# Patient Record
Sex: FEMALE | Race: BLACK OR AFRICAN AMERICAN | NOT HISPANIC OR LATINO | Employment: OTHER | URBAN - METROPOLITAN AREA
[De-identification: names, ages, dates, MRNs, and addresses within clinical notes are randomized per-mention and may not be internally consistent; named-entity substitution may affect disease eponyms.]

---

## 2018-09-17 ENCOUNTER — HOSPITAL ENCOUNTER (EMERGENCY)
Facility: HOSPITAL | Age: 68
Discharge: HOME | End: 2018-09-17
Payer: MEDICARE

## 2018-09-17 VITALS
TEMPERATURE: 99 F | DIASTOLIC BLOOD PRESSURE: 76 MMHG | SYSTOLIC BLOOD PRESSURE: 165 MMHG | WEIGHT: 193 LBS | HEART RATE: 72 BPM | RESPIRATION RATE: 18 BRPM | BODY MASS INDEX: 34.2 KG/M2 | HEIGHT: 63 IN | OXYGEN SATURATION: 97 %

## 2018-09-17 DIAGNOSIS — J32.9 SINUSITIS, UNSPECIFIED CHRONICITY, UNSPECIFIED LOCATION: Primary | ICD-10-CM

## 2018-09-17 PROCEDURE — 99283 EMERGENCY DEPT VISIT LOW MDM: CPT

## 2018-09-17 RX ORDER — AZITHROMYCIN 250 MG/1
250 TABLET, FILM COATED ORAL DAILY
Qty: 6 TABLET | Refills: 0 | Status: SHIPPED | OUTPATIENT
Start: 2018-09-17 | End: 2019-02-05 | Stop reason: CLARIF

## 2018-09-17 RX ORDER — CETIRIZINE HYDROCHLORIDE 10 MG/1
10 TABLET ORAL DAILY
Qty: 30 TABLET | Refills: 0 | Status: SHIPPED | OUTPATIENT
Start: 2018-09-17 | End: 2021-10-29

## 2018-09-17 NOTE — ED PROVIDER NOTES
Encounter Date: 2018       History     Chief Complaint   Patient presents with    Sore Throat     Onset of symptoms 2 weeks ago.     Headache     Described to be frontal in nature and with a pressure sensation.    Facial Pain    Otalgia     Reports pain worse to the left ear.    Nasal Congestion     Pt complains of sore throat and sinus pressure for the past week.  Denies decreased intake or any other complaints.          Review of patient's allergies indicates:   Allergen Reactions    Ketorolac tromethamine Other (See Comments)     Other reaction(s): Burning    Moxifloxacin Other (See Comments)     Heart racing.     Past Medical History:   Diagnosis Date    Anxiety     Arthritis     Diabetes mellitus     Hypertension      Past Surgical History:   Procedure Laterality Date    BREAST SURGERY Left     FOOT SURGERY Bilateral     SINUS SURGERY      TUBAL LIGATION      WRIST SURGERY Left      No family history on file.  Social History     Tobacco Use    Smoking status: Former Smoker     Last attempt to quit: 1999     Years since quittin.4    Smokeless tobacco: Never Used   Substance Use Topics    Alcohol use: Not on file     Comment: social    Drug use: No     Review of Systems   Constitutional: Negative.    HENT: Positive for congestion, rhinorrhea, sinus pressure and sinus pain.    Eyes: Negative.    Respiratory: Negative.    Cardiovascular: Negative.    Gastrointestinal: Negative.    Endocrine: Negative.    Genitourinary: Negative.    Musculoskeletal: Negative.    Neurological: Negative.    Psychiatric/Behavioral: Negative.        Physical Exam     Initial Vitals   BP Pulse Resp Temp SpO2   -- -- -- -- --      MAP       --         Physical Exam    Constitutional: She appears well-developed.   HENT:   Head: Normocephalic and atraumatic.   Right Ear: Tympanic membrane normal.   Left Ear: Tympanic membrane normal.   Nose: Right sinus exhibits maxillary sinus tenderness and frontal sinus  tenderness.   Eyes: Pupils are equal, round, and reactive to light.   Neck: Normal range of motion.   Cardiovascular: Normal rate and regular rhythm.   Pulmonary/Chest: Breath sounds normal.   Abdominal: Soft.   Neurological: She is alert and oriented to person, place, and time. GCS score is 15. GCS eye subscore is 4. GCS verbal subscore is 5. GCS motor subscore is 6.   Skin: Skin is warm and dry. Capillary refill takes less than 2 seconds.         ED Course   Procedures  Labs Reviewed - No data to display       Imaging Results    None                               Clinical Impression:   The encounter diagnosis was Sinusitis, unspecified chronicity, unspecified location.      Disposition:   Disposition: Discharged  Condition: Julian Schroeder NP  09/17/18 [id number]

## 2018-09-17 NOTE — ED NOTES
Discharge instructions discussed with pt. All questions answered. Delay in discharge until pt's granddaughter, who is also being treated in the ED is also discharged. Pt verbalized understanding.

## 2019-01-21 DIAGNOSIS — J32.4 CHRONIC PANSINUSITIS: Primary | ICD-10-CM

## 2019-01-24 ENCOUNTER — HOSPITAL ENCOUNTER (OUTPATIENT)
Dept: RADIOLOGY | Facility: HOSPITAL | Age: 69
Discharge: HOME | End: 2019-01-24
Attending: OTOLARYNGOLOGY
Payer: MEDICARE

## 2019-01-24 DIAGNOSIS — J32.4 CHRONIC PANSINUSITIS: ICD-10-CM

## 2019-01-24 PROCEDURE — 70486 CT MAXILLOFACIAL W/O DYE: CPT | Mod: 26,,, | Performed by: RADIOLOGY

## 2019-01-24 PROCEDURE — 70486 CT MAXILLOFACIAL W/O DYE: CPT | Mod: TC

## 2019-01-24 PROCEDURE — 70486 CT SINUSES WITHOUT CONTRAST: ICD-10-PCS | Mod: 26,,, | Performed by: RADIOLOGY

## 2019-01-29 ENCOUNTER — APPOINTMENT (OUTPATIENT)
Dept: LAB | Facility: HOSPITAL | Age: 69
End: 2019-01-29
Attending: OTOLARYNGOLOGY
Payer: MEDICARE

## 2019-01-29 DIAGNOSIS — J32.9 CHRONIC SINUSITIS: Primary | ICD-10-CM

## 2019-01-29 PROCEDURE — 87070 CULTURE OTHR SPECIMN AEROBIC: CPT

## 2019-01-31 LAB — BACTERIA NPH AEROBE CULT: NORMAL

## 2019-02-05 ENCOUNTER — HOSPITAL ENCOUNTER (OUTPATIENT)
Dept: PREADMISSION TESTING | Facility: HOSPITAL | Age: 69
Discharge: HOME | End: 2019-02-05
Attending: OTOLARYNGOLOGY
Payer: MEDICARE

## 2019-02-05 ENCOUNTER — HOSPITAL ENCOUNTER (OUTPATIENT)
Dept: RADIOLOGY | Facility: HOSPITAL | Age: 69
Discharge: HOME | End: 2019-02-05
Attending: OTOLARYNGOLOGY
Payer: MEDICARE

## 2019-02-05 ENCOUNTER — ANESTHESIA EVENT (OUTPATIENT)
Dept: SURGERY | Facility: HOSPITAL | Age: 69
End: 2019-02-05
Payer: MEDICARE

## 2019-02-05 ENCOUNTER — HOSPITAL ENCOUNTER (OUTPATIENT)
Dept: CARDIOLOGY | Facility: HOSPITAL | Age: 69
Discharge: HOME | End: 2019-02-05
Attending: OTOLARYNGOLOGY
Payer: MEDICARE

## 2019-02-05 VITALS — WEIGHT: 190 LBS | BODY MASS INDEX: 34.96 KG/M2 | HEIGHT: 62 IN

## 2019-02-05 DIAGNOSIS — Z01.818 PRE-OPERATIVE EXAMINATION: Primary | ICD-10-CM

## 2019-02-05 DIAGNOSIS — Z01.818 PRE-OPERATIVE EXAMINATION: ICD-10-CM

## 2019-02-05 PROCEDURE — 71046 XR CHEST PA AND LATERAL: ICD-10-PCS | Mod: 26,,, | Performed by: RADIOLOGY

## 2019-02-05 PROCEDURE — 71046 X-RAY EXAM CHEST 2 VIEWS: CPT | Mod: TC,FY

## 2019-02-05 PROCEDURE — 93005 ELECTROCARDIOGRAM TRACING: CPT

## 2019-02-05 PROCEDURE — 71046 X-RAY EXAM CHEST 2 VIEWS: CPT | Mod: 26,,, | Performed by: RADIOLOGY

## 2019-02-05 RX ORDER — GLIMEPIRIDE 2 MG/1
2 TABLET ORAL
COMMUNITY
End: 2023-06-05

## 2019-02-05 RX ORDER — CEPHALEXIN 500 MG/1
500 CAPSULE ORAL 4 TIMES DAILY
COMMUNITY
Start: 2019-02-07 | End: 2019-07-22

## 2019-02-05 RX ORDER — FLUTICASONE PROPIONATE 50 MCG
2 SPRAY, SUSPENSION (ML) NASAL DAILY PRN
COMMUNITY
End: 2023-06-05 | Stop reason: SDUPTHER

## 2019-02-05 RX ORDER — ERGOCALCIFEROL 1.25 MG/1
50000 CAPSULE ORAL
COMMUNITY
End: 2021-10-29

## 2019-02-05 RX ORDER — AMLODIPINE BESYLATE 10 MG/1
10 TABLET ORAL NIGHTLY
COMMUNITY
End: 2023-06-05 | Stop reason: SDUPTHER

## 2019-02-05 RX ORDER — POTASSIUM CHLORIDE 1.5 G/1.58G
20 POWDER, FOR SOLUTION ORAL 2 TIMES DAILY
COMMUNITY
End: 2023-06-05

## 2019-02-05 RX ORDER — FLUTICASONE FUROATE AND VILANTEROL 200; 25 UG/1; UG/1
1 POWDER RESPIRATORY (INHALATION) DAILY PRN
COMMUNITY
End: 2021-11-29 | Stop reason: CLARIF

## 2019-02-05 RX ORDER — HYDROCODONE BITARTRATE AND ACETAMINOPHEN 10; 325 MG/1; MG/1
1 TABLET ORAL EVERY 6 HOURS PRN
COMMUNITY
Start: 2019-02-07 | End: 2019-07-22

## 2019-02-05 RX ORDER — LOSARTAN POTASSIUM AND HYDROCHLOROTHIAZIDE 25; 100 MG/1; MG/1
1 TABLET ORAL DAILY
COMMUNITY
End: 2023-06-05 | Stop reason: SDUPTHER

## 2019-02-05 RX ORDER — METFORMIN HYDROCHLORIDE 500 MG/1
500 TABLET ORAL 2 TIMES DAILY WITH MEALS
COMMUNITY
End: 2021-10-29

## 2019-02-05 RX ORDER — PAROXETINE HYDROCHLORIDE 20 MG/1
20 TABLET, FILM COATED ORAL 2 TIMES DAILY
COMMUNITY
End: 2021-10-29

## 2019-02-05 NOTE — ANESTHESIA PREPROCEDURE EVALUATION
02/05/2019  Emerald Pyle is a 68 y.o., female.    Pre-op Assessment    I have reviewed the Patient Summary Reports.    I have reviewed the Nursing Notes.   I have reviewed the Medications.     Review of Systems  Anesthesia Hx:  No problems with previous Anesthesia  Neg history of prior surgery. Denies Family Hx of Anesthesia complications.   Denies Personal Hx of Anesthesia complications.   Social:  Non-Smoker    Hematology/Oncology:  Hematology Normal   Oncology Normal     EENT/Dental:EENT/Dental Normal   Cardiovascular:   Hypertension, well controlled                  Heart Murmmur   Pulmonary:  Pulmonary Normal    Renal/:  Renal/ Normal     Hepatic/GI:  Hepatic/GI Normal    Musculoskeletal:  Musculoskeletal Normal    Endocrine:   Diabetes, well controlled    Dermatological:  Skin Normal    Psych:  Psychiatric Normal           Physical Exam  General:  Well nourished    Airway/Jaw/Neck:  AIRWAY FINDINGS: Normal      Eyes/Ears/Nose:  EYES/EARS/NOSE FINDINGS: Normal   Dental:  DENTAL FINDINGS: Normal   Chest/Lungs:  Chest/Lungs Clear    Heart/Vascular:  Heart Findings: Normal Heart murmur: negative Vascular Findings: Normal    Abdomen:  Abdomen Findings: Normal    Musculoskeletal:  Musculoskeletal Findings: Normal   Skin:  Skin Findings: Normal         Anesthesia Plan  Type of Anesthesia, risks & benefits discussed:  Anesthesia Type:  general  Patient's Preference:   Intra-op Monitoring Plan: standard ASA monitors  Intra-op Monitoring Plan Comments:   Post Op Pain Control Plan:   Post Op Pain Control Plan Comments:   Induction:   IV  Beta Blocker:  Patient is not currently on a Beta-Blocker (No further documentation required).       Informed Consent: Patient understands risks and agrees with Anesthesia plan.  Questions answered. Anesthesia consent signed with patient.  ASA Score: 3     Day of Surgery  Review of History & Physical:    H&P update referred to the provider.

## 2019-02-07 ENCOUNTER — ANESTHESIA (OUTPATIENT)
Dept: SURGERY | Facility: HOSPITAL | Age: 69
End: 2019-02-07
Payer: MEDICARE

## 2019-02-07 ENCOUNTER — HOSPITAL ENCOUNTER (OUTPATIENT)
Facility: HOSPITAL | Age: 69
Discharge: HOME | End: 2019-02-07
Attending: OTOLARYNGOLOGY | Admitting: OTOLARYNGOLOGY
Payer: MEDICARE

## 2019-02-07 VITALS
SYSTOLIC BLOOD PRESSURE: 159 MMHG | TEMPERATURE: 99 F | BODY MASS INDEX: 34.96 KG/M2 | RESPIRATION RATE: 18 BRPM | HEIGHT: 62 IN | HEART RATE: 80 BPM | OXYGEN SATURATION: 97 % | WEIGHT: 190 LBS | DIASTOLIC BLOOD PRESSURE: 88 MMHG

## 2019-02-07 DIAGNOSIS — J32.0 ANTRITIS CHRONIC: ICD-10-CM

## 2019-02-07 LAB — POCT GLUCOSE: 214 MG/DL (ref 70–110)

## 2019-02-07 PROCEDURE — 88305 TISSUE EXAM BY PATHOLOGIST: CPT | Mod: 26,,, | Performed by: PATHOLOGY

## 2019-02-07 PROCEDURE — D9220A PRA ANESTHESIA: Mod: CRNA,,, | Performed by: NURSE ANESTHETIST, CERTIFIED REGISTERED

## 2019-02-07 PROCEDURE — 36000708 HC OR TIME LEV III 1ST 15 MIN: Performed by: OTOLARYNGOLOGY

## 2019-02-07 PROCEDURE — 88305 TISSUE SPECIMEN TO PATHOLOGY - SURGERY: ICD-10-PCS | Mod: 26,,, | Performed by: PATHOLOGY

## 2019-02-07 PROCEDURE — 63600175 PHARM REV CODE 636 W HCPCS

## 2019-02-07 PROCEDURE — C1726 CATH, BAL DIL, NON-VASCULAR: HCPCS | Performed by: OTOLARYNGOLOGY

## 2019-02-07 PROCEDURE — 25000003 PHARM REV CODE 250: Performed by: OTOLARYNGOLOGY

## 2019-02-07 PROCEDURE — 63600175 PHARM REV CODE 636 W HCPCS: Performed by: ANESTHESIOLOGY

## 2019-02-07 PROCEDURE — C2625 STENT, NON-COR, TEM W/DEL SY: HCPCS | Performed by: OTOLARYNGOLOGY

## 2019-02-07 PROCEDURE — 63600175 PHARM REV CODE 636 W HCPCS: Performed by: NURSE ANESTHETIST, CERTIFIED REGISTERED

## 2019-02-07 PROCEDURE — 25000003 PHARM REV CODE 250: Performed by: ANESTHESIOLOGY

## 2019-02-07 PROCEDURE — 37000008 HC ANESTHESIA 1ST 15 MINUTES: Performed by: OTOLARYNGOLOGY

## 2019-02-07 PROCEDURE — D9220A PRA ANESTHESIA: ICD-10-PCS | Mod: CRNA,,, | Performed by: NURSE ANESTHETIST, CERTIFIED REGISTERED

## 2019-02-07 PROCEDURE — 71000015 HC POSTOP RECOV 1ST HR: Performed by: OTOLARYNGOLOGY

## 2019-02-07 PROCEDURE — 71000033 HC RECOVERY, INTIAL HOUR: Performed by: OTOLARYNGOLOGY

## 2019-02-07 PROCEDURE — 27201423 OPTIME MED/SURG SUP & DEVICES STERILE SUPPLY: Performed by: OTOLARYNGOLOGY

## 2019-02-07 PROCEDURE — 63600175 PHARM REV CODE 636 W HCPCS: Performed by: OTOLARYNGOLOGY

## 2019-02-07 PROCEDURE — 36000709 HC OR TIME LEV III EA ADD 15 MIN: Performed by: OTOLARYNGOLOGY

## 2019-02-07 PROCEDURE — D9220A PRA ANESTHESIA: Mod: ANES,,, | Performed by: ANESTHESIOLOGY

## 2019-02-07 PROCEDURE — D9220A PRA ANESTHESIA: ICD-10-PCS | Mod: ANES,,, | Performed by: ANESTHESIOLOGY

## 2019-02-07 PROCEDURE — 37000009 HC ANESTHESIA EA ADD 15 MINS: Performed by: OTOLARYNGOLOGY

## 2019-02-07 PROCEDURE — 88305 TISSUE EXAM BY PATHOLOGIST: CPT | Performed by: PATHOLOGY

## 2019-02-07 PROCEDURE — 25000003 PHARM REV CODE 250: Performed by: NURSE ANESTHETIST, CERTIFIED REGISTERED

## 2019-02-07 DEVICE — IMPLANT PROPEL MOMETASON 8MM: Type: IMPLANTABLE DEVICE | Site: SINUS | Status: FUNCTIONAL

## 2019-02-07 RX ORDER — LIDOCAINE HYDROCHLORIDE 10 MG/ML
1 INJECTION, SOLUTION EPIDURAL; INFILTRATION; INTRACAUDAL; PERINEURAL ONCE
Status: DISCONTINUED | OUTPATIENT
Start: 2019-02-07 | End: 2019-02-07 | Stop reason: HOSPADM

## 2019-02-07 RX ORDER — METOPROLOL TARTRATE 25 MG/1
25 TABLET, FILM COATED ORAL 2 TIMES DAILY
COMMUNITY
End: 2021-10-29

## 2019-02-07 RX ORDER — MIDAZOLAM HYDROCHLORIDE 1 MG/ML
INJECTION, SOLUTION INTRAMUSCULAR; INTRAVENOUS
Status: DISCONTINUED | OUTPATIENT
Start: 2019-02-07 | End: 2019-02-07

## 2019-02-07 RX ORDER — MIDAZOLAM HYDROCHLORIDE 1 MG/ML
INJECTION INTRAMUSCULAR; INTRAVENOUS
Status: DISCONTINUED
Start: 2019-02-07 | End: 2019-02-07 | Stop reason: HOSPADM

## 2019-02-07 RX ORDER — MEPERIDINE HYDROCHLORIDE 50 MG/ML
INJECTION INTRAMUSCULAR; INTRAVENOUS; SUBCUTANEOUS
Status: DISCONTINUED | OUTPATIENT
Start: 2019-02-07 | End: 2019-02-07

## 2019-02-07 RX ORDER — SUCCINYLCHOLINE CHLORIDE 20 MG/ML
INJECTION INTRAMUSCULAR; INTRAVENOUS
Status: DISCONTINUED | OUTPATIENT
Start: 2019-02-07 | End: 2019-02-07

## 2019-02-07 RX ORDER — SODIUM CHLORIDE, SODIUM LACTATE, POTASSIUM CHLORIDE, CALCIUM CHLORIDE 600; 310; 30; 20 MG/100ML; MG/100ML; MG/100ML; MG/100ML
125 INJECTION, SOLUTION INTRAVENOUS CONTINUOUS
Status: DISCONTINUED | OUTPATIENT
Start: 2019-02-07 | End: 2019-02-07 | Stop reason: HOSPADM

## 2019-02-07 RX ORDER — MIDAZOLAM HYDROCHLORIDE 1 MG/ML
2 INJECTION INTRAMUSCULAR; INTRAVENOUS ONCE
Status: COMPLETED | OUTPATIENT
Start: 2019-02-07 | End: 2019-02-07

## 2019-02-07 RX ORDER — MORPHINE SULFATE 2 MG/ML
INJECTION, SOLUTION INTRAMUSCULAR; INTRAVENOUS
Status: COMPLETED
Start: 2019-02-07 | End: 2019-02-07

## 2019-02-07 RX ORDER — MORPHINE SULFATE 2 MG/ML
2 INJECTION, SOLUTION INTRAMUSCULAR; INTRAVENOUS EVERY 5 MIN PRN
Status: DISCONTINUED | OUTPATIENT
Start: 2019-02-07 | End: 2019-02-07 | Stop reason: HOSPADM

## 2019-02-07 RX ORDER — ONDANSETRON 2 MG/ML
INJECTION INTRAMUSCULAR; INTRAVENOUS
Status: DISCONTINUED | OUTPATIENT
Start: 2019-02-07 | End: 2019-02-07

## 2019-02-07 RX ORDER — LABETALOL HCL 20 MG/4 ML
SYRINGE (ML) INTRAVENOUS
Status: DISCONTINUED
Start: 2019-02-07 | End: 2019-02-07 | Stop reason: HOSPADM

## 2019-02-07 RX ORDER — PROPOFOL 10 MG/ML
VIAL (ML) INTRAVENOUS
Status: DISCONTINUED | OUTPATIENT
Start: 2019-02-07 | End: 2019-02-07

## 2019-02-07 RX ORDER — SODIUM CHLORIDE, SODIUM LACTATE, POTASSIUM CHLORIDE, CALCIUM CHLORIDE 600; 310; 30; 20 MG/100ML; MG/100ML; MG/100ML; MG/100ML
INJECTION, SOLUTION INTRAVENOUS CONTINUOUS PRN
Status: DISCONTINUED | OUTPATIENT
Start: 2019-02-07 | End: 2019-02-07

## 2019-02-07 RX ORDER — TRIAMCINOLONE ACETONIDE 40 MG/ML
120 INJECTION, SUSPENSION INTRA-ARTICULAR; INTRAMUSCULAR ONCE
Status: DISCONTINUED | OUTPATIENT
Start: 2019-02-07 | End: 2019-02-07

## 2019-02-07 RX ORDER — BUDESONIDE 0.25 MG/2ML
INHALANT ORAL
Status: DISCONTINUED
Start: 2019-02-07 | End: 2019-02-07 | Stop reason: WASHOUT

## 2019-02-07 RX ORDER — LABETALOL HCL 20 MG/4 ML
10 SYRINGE (ML) INTRAVENOUS EVERY 10 MIN PRN
Status: DISCONTINUED | OUTPATIENT
Start: 2019-02-07 | End: 2019-02-07 | Stop reason: HOSPADM

## 2019-02-07 RX ORDER — ONDANSETRON 2 MG/ML
4 INJECTION INTRAMUSCULAR; INTRAVENOUS DAILY PRN
Status: DISCONTINUED | OUTPATIENT
Start: 2019-02-07 | End: 2019-02-07 | Stop reason: HOSPADM

## 2019-02-07 RX ORDER — LIDOCAINE HYDROCHLORIDE AND EPINEPHRINE 10; 10 MG/ML; UG/ML
INJECTION, SOLUTION INFILTRATION; PERINEURAL
Status: DISCONTINUED | OUTPATIENT
Start: 2019-02-07 | End: 2019-02-07 | Stop reason: HOSPADM

## 2019-02-07 RX ORDER — OXYMETAZOLINE HCL 0.05 %
SPRAY, NON-AEROSOL (ML) NASAL
Status: DISCONTINUED | OUTPATIENT
Start: 2019-02-07 | End: 2019-02-07 | Stop reason: HOSPADM

## 2019-02-07 RX ORDER — ROCURONIUM BROMIDE 10 MG/ML
INJECTION, SOLUTION INTRAVENOUS
Status: DISCONTINUED | OUTPATIENT
Start: 2019-02-07 | End: 2019-02-07

## 2019-02-07 RX ORDER — SODIUM CHLORIDE, SODIUM LACTATE, POTASSIUM CHLORIDE, CALCIUM CHLORIDE 600; 310; 30; 20 MG/100ML; MG/100ML; MG/100ML; MG/100ML
INJECTION, SOLUTION INTRAVENOUS CONTINUOUS
Status: DISCONTINUED | OUTPATIENT
Start: 2019-02-07 | End: 2019-02-07 | Stop reason: HOSPADM

## 2019-02-07 RX ORDER — CEFAZOLIN SODIUM 1 G/3ML
INJECTION, POWDER, FOR SOLUTION INTRAMUSCULAR; INTRAVENOUS
Status: DISCONTINUED | OUTPATIENT
Start: 2019-02-07 | End: 2019-02-07

## 2019-02-07 RX ORDER — TRIAMCINOLONE ACETONIDE 40 MG/ML
80 INJECTION, SUSPENSION INTRA-ARTICULAR; INTRAMUSCULAR ONCE
Status: COMPLETED | OUTPATIENT
Start: 2019-02-07 | End: 2019-02-07

## 2019-02-07 RX ADMIN — ONDANSETRON 4 MG: 2 INJECTION INTRAMUSCULAR; INTRAVENOUS at 11:02

## 2019-02-07 RX ADMIN — MORPHINE SULFATE 2 MG: 2 INJECTION, SOLUTION INTRAMUSCULAR; INTRAVENOUS at 02:02

## 2019-02-07 RX ADMIN — SODIUM CHLORIDE, POTASSIUM CHLORIDE, SODIUM LACTATE AND CALCIUM CHLORIDE: 600; 310; 30; 20 INJECTION, SOLUTION INTRAVENOUS at 11:02

## 2019-02-07 RX ADMIN — LABETALOL HYDROCHLORIDE 10 MG: 5 INJECTION, SOLUTION INTRAVENOUS at 02:02

## 2019-02-07 RX ADMIN — MIDAZOLAM HYDROCHLORIDE 2 MG: 1 INJECTION, SOLUTION INTRAMUSCULAR; INTRAVENOUS at 11:02

## 2019-02-07 RX ADMIN — ROCURONIUM BROMIDE 30 MG: 10 INJECTION, SOLUTION INTRAVENOUS at 12:02

## 2019-02-07 RX ADMIN — CEFAZOLIN 1 G: 330 INJECTION, POWDER, FOR SOLUTION INTRAMUSCULAR; INTRAVENOUS at 12:02

## 2019-02-07 RX ADMIN — SUCCINYLCHOLINE CHLORIDE 150 MG: 20 INJECTION, SOLUTION INTRAMUSCULAR; INTRAVENOUS at 11:02

## 2019-02-07 RX ADMIN — SODIUM CHLORIDE, POTASSIUM CHLORIDE, SODIUM LACTATE AND CALCIUM CHLORIDE: 600; 310; 30; 20 INJECTION, SOLUTION INTRAVENOUS at 10:02

## 2019-02-07 RX ADMIN — MIDAZOLAM HYDROCHLORIDE 2 MG: 1 INJECTION, SOLUTION INTRAMUSCULAR; INTRAVENOUS at 10:02

## 2019-02-07 RX ADMIN — MEPERIDINE HYDROCHLORIDE 50 MG: 50 INJECTION INTRAMUSCULAR; INTRAVENOUS; SUBCUTANEOUS at 11:02

## 2019-02-07 RX ADMIN — SUGAMMADEX 200 MG: 100 INJECTION, SOLUTION INTRAVENOUS at 01:02

## 2019-02-07 RX ADMIN — TRIAMCINOLONE ACETONIDE 80 MG: 40 INJECTION, SUSPENSION INTRA-ARTICULAR; INTRAMUSCULAR at 02:02

## 2019-02-07 RX ADMIN — PROPOFOL 200 MG: 10 INJECTION, EMULSION INTRAVENOUS at 11:02

## 2019-02-07 NOTE — BRIEF OP NOTE
[facility] – [place], [place] - Periop Services  Brief Operative Note     SUMMARY     Surgery Date: 2/7/2019     Surgeon(s) and Role:     * Michael Grubbs MD - Primary        Pre-op Diagnosis:  Deviated nasal septum [J34.2]  Upper respiratory tract obstruction [J98.8]  Hypertrophy of nasal turbinates [J34.3]  Chronic ethmoidal sinusitis [J32.2]  Chronic sphenoidal sinusitis [J32.3]  Chronic frontal sinusitis [J32.1]  Chronic maxillary sinusitis [J32.0]    Post-op Diagnosis:  Post-Op Diagnosis Codes:     * Deviated nasal septum [J34.2]     * Upper respiratory tract obstruction [J98.8]     * Hypertrophy of nasal turbinates [J34.3]     * Chronic ethmoidal sinusitis [J32.2]     * Chronic sphenoidal sinusitis [J32.3]     * Chronic frontal sinusitis [J32.1]     * Chronic maxillary sinusitis [J32.0]    Procedure(s) (LRB):  FESS (FUNCTIONAL ENDOSCOPIC SINUS SURGERY) (N/A)  SEPTOPLASTY, NOSE (Bilateral)  REDUCTION, NASAL TURBINATE (Bilateral)      Description of the findings of the procedure:  Deviated nasal septum [J34.2]  Upper respiratory tract obstruction [J98.8]  Hypertrophy of nasal turbinates [J34.3]  Chronic ethmoidal sinusitis [J32.2]  Chronic sphenoidal sinusitis [J32.3]  Chronic frontal sinusitis [J32.1]  Chronic maxillary sinusitis [J32.0]      Estimated Blood Loss: * No values recorded between 2/7/2019 12:12 PM and 2/7/2019  1:53 PM *

## 2019-02-07 NOTE — PLAN OF CARE
Neuro assessment complete. WNL. Discharge teaching complete. All questions answered and handouts provided.

## 2019-02-07 NOTE — OP NOTE
DATE OF PROCEDURE:  02/07/2019.    PREOPERATIVE DIAGNOSES:  1.  Chronic sinusitis.  2.  Bilateral inferior turbinate hypertrophy.  3.  Nasal septal deviation.  4.  Nasal airway obstruction secondary to #2 and #3.    POSTOPERATIVE DIAGNOSIS:    PROCEDURES PERFORMED:  1.  Bilateral endoscopic anterior and posterior ethmoidectomy.  2.  Bilateral endoscopic maxillary sinus antrostomies.  3.  Bilateral sphenoidotomies.  4.  Bilateral frontal sinus exploration with removal of tissue.  5.  Septoplasty.  6.  Bilateral SMR of inferior turbinates.    ANESTHESIA:  General endotracheal.    SURGEON:  Dr. Grubbs.    PROCEDURE IN DETAILS:  The patient was taken to the operating room and  placed in the supine position. An IV was placed into the patient's arm.  After satisfactory general anesthesia had been obtained, the procedure was  begun. Four pledgets of 4 mL of 4% cocaine were placed into the patient's  nose. The patient's nose was injected with 1% lidocaine with 1:100,000  epinephrine; approximately 15 mL was used. The patient was prepped and  draped in the standard fashion for a nasal operation.    Under endoscopic vision, the Sawyer was walked down the lateral nasal wall  of the right nostril until it fell into the anterior ethmoid cells. The  anterior ethmoid cells were exenterated back to the lamina ethmoidalis. The  lamina ethmoidalis was breached, and the posterior ethmoid cells  exenterated back to the rostrum of the sphenoid. Under endoscopic vision  the sphenoid ostium was viewed and palpated and opened to approximately 5  mm in diameter. The 30 degree scope was taken and the maxillary sinus  viewed and palpated and the ostium opened to approximately 1 cm in  diameter. With a 70 degree scope, the nasofrontal area of the ethmoid  sinuses was viewed and, under endoscopic guidance, the nasofrontal duct was  opened so that it emptied widely into the nose. This region was then packed  with 2 cocaine pledgets and attention  turned to the left side.    Under endoscopic vision, the Bulloch was walked down the lateral nasal wall  of the left nostril until it fell into the anterior ethmoid cells. The  anterior ethmoid cells were exenterated back to the lamina ethmoidalis. The  lamina ethmoidalis was breached and the posterior ethmoid cells exenterated  back to the rostrum of the sphenoid. The 0 degree scope was taken and the  sphenoid ostium viewed and palpated. This was opened to approximately 5 mm  in diameter. The 30 degree scope was taken and the maxillary sinus ostium  viewed and palpated and opened to approximately 1 cm in diameter. With a 70  degree scope, the nasofrontal area of the ethmoid sinuses was viewed and,  under endoscopic guidance, the nasofrontal duct was opened so that it  emptied widely into the nose. The left airway was then packed with cocaine  pledgets.    Next, the inferior turbinates bilaterally were injected with lidocaine 1%  with epinephrine 1:100,000; approximately 10 mL was used. Then 4 mL of 4%  cocaine was placed on 4 pledgets and placed into the patient's nose. The  cocaine pledgets were removed and the procedure was begun. Attention was  turned to the right inferior turbinate. An incision was made along the  inferior aspect of the right inferior turbinate from posterior to anterior.  A medial flap was elevated with the Bulloch. The turbinate bone and lateral  turbinate mucosa were excised over the anterior one half of the turbinate  and the medial flap laid back over the operative bed. Attention was then  turned to the left inferior turbinate. An incision was made from posterior  to anterior along the inferior surface of the inferior turbinate. A medial  flap was elevated. The turbinate bone and the lateral turbinate mucosa were  removed over the anterior one half of the inferior turbinate and the medial  flap laid back over the operative bed.    The septum was then injected with lidocaine and epinephrine  on each side;  approximately 10 mL was used. A standard septoplasty was then accomplished.  Cocaine pledgets were replaced. Packs were placed into the middle meatus,  and nasal packs were placed medial to the inferior turbinates. The patient  was awakened and taken to the recovery room.    ADDENDUM:  After the frontal sinus explorations had been accomplished and  the frontal sinus ducts opened widely into the nose, tissue was removed  from the frontal sinuses bilaterally in order to allow the remainder of the  frontal sinus to communicate with the frontal sinus ducts.        KEN/JAMEL dd: 02/07/2019 14:01:00 (CST)   td: 02/07/2019 15:02:35 (CST)  Doc ID #[id number]   Job ID #[id number]    CC:

## 2019-02-07 NOTE — DISCHARGE INSTRUCTIONS
Nasal Surgery: Septoplasty    Youre scheduled to have nasal surgery. The type of nasal surgery youre having is called septoplasty. Read on to learn more about what to expect during this surgery.During surgery, the surgeon may remove cartilage and bone to reshape the deviated septum. After surgery, there is more breathing space. Enough cartilage and bone remain to give the nose support.  What to expect during septoplasty  This surgery repairs a blockage inside the nose caused by a deviated septum. With a deviated septum, there is a problem with the wall that divides the nose into two chambers. A deviated septum may block air coming through one or both nostrils. This makes it harder for you to breathe through your nose. During septoplasty, the surgeon makes incisions inside the nose. Then the surgeon trims, reshapes, moves, or removes cartilage and sometimes bone from the septum.  Risks and possible complications  As with any surgery, nasal surgery has some risks. These include a slight risk of bleeding and infection. Your doctor will discuss any other risks and complications with you.   After septoplasty  After septoplasty, youll be taken to a recovery area or to your hospital room. Your experience may be as follows:  · You may have packing material inside your nose. This reduces bleeding and promotes healing. You may also have bandages (dressings) on the outside of your nose.  · Its normal to have some mucus and blood drain from your nose. Until packing is removed, you may have to breathe through your mouth.  · You may have some swelling or bruising around the eyelids if a rhinoplasty was also done.  · Expect some throat dryness and irritation.  · Pain medicine will be prescribed as needed. Dont take medicine that contains aspirin or ibuprofen. These can cause increased bleeding.  Follow-up care  Youll need to follow up with your doctor within a week after your surgery. Here is what to expect:  · Any  packing, splint, or dressings will probably be removed. You may feel slight discomfort and bleed a little when this is done.  · After the splint or packing is removed, youll most likely breathe better than you did before surgery.  · You may have minor numbness, pain, swelling, and a little stiffness under the tip of the nose.  · In a few days, the inside of your nose may swell and briefly block your breathing. Or a scab or crust may block breathing for a short time. Leave the scab alone. Your doctor can remove it. Using saline (irrigation or aerosol) regularly after surgery helps to reduce the amount of crusting at each visit.  · Contact your healthcare provider if you have any questions or concerns.  Date Last Reviewed: 11/1/2016 © 2000-2017 [employer]. [address] [place] [address], [place] [address]. All rights reserved. This information is not intended as a substitute for professional medical care. Always follow your healthcare professional's instructions.        Discharge Instructions: After Your Surgery  Youve just had surgery. During surgery, you were given medicine called anesthesia to keep you relaxed and free of pain. After surgery, you may have some pain or nausea. This is common. Here are some tips for feeling better and getting well after surgery.     Stay on schedule with your medicine.   Going home  Your healthcare provider will show you how to take care of yourself when you go home. He or she will also answer your questions. Have an adult family member or friend drive you home. For the first 24 hours after your surgery:  · Do not drive or use heavy equipment.  · Do not make important decisions or sign legal papers.  · Do not drink alcohol.  · Have someone stay with you, if needed. He or she can watch for problems and help keep you safe.  Be sure to go to all follow-up visits with your healthcare provider. And rest after your surgery for as long as your healthcare provider tells you  to.  Coping with pain  If you have pain after surgery, pain medicine will help you feel better. Take it as told, before pain becomes severe. Also, ask your healthcare provider or pharmacist about other ways to control pain. This might be with heat, ice, or relaxation. And follow any other instructions your surgeon or nurse gives you.  Tips for taking pain medicine  To get the best relief possible, remember these points:  · Pain medicines can upset your stomach. Taking them with a little food may help.  · Most pain relievers taken by mouth need at least 20 to 30 minutes to start to work.  · Taking medicine on a schedule can help you remember to take it. Try to time your medicine so that you can take it before starting an activity. This might be before you get dressed, go for a walk, or sit down for dinner.  · Constipation is a common side effect of pain medicines. Call your healthcare provider before taking any medicines such as laxatives or stool softeners to help ease constipation. Also ask if you should skip any foods. Drinking lots of fluids and eating foods such as fruits and vegetables that are high in fiber can also help. Remember, do not take laxatives unless your surgeon has prescribed them.  · Drinking alcohol and taking pain medicine can cause dizziness and slow your breathing. It can even be deadly. Do not drink alcohol while taking pain medicine.  · Pain medicine can make you react more slowly to things. Do not drive or run machinery while taking pain medicine.  Your healthcare provider may tell you to take acetaminophen to help ease your pain. Ask him or her how much you are supposed to take each day. Acetaminophen or other pain relievers may interact with your prescription medicines or other over-the-counter (OTC) medicines. Some prescription medicines have acetaminophen and other ingredients. Using both prescription and OTC acetaminophen for pain can cause you to overdose. Read the labels on your OTC  medicines with care. This will help you to clearly know the list of ingredients, how much to take, and any warnings. It may also help you not take too much acetaminophen. If you have questions or do not understand the information, ask your pharmacist or healthcare provider to explain it to you before you take the OTC medicine.  Managing nausea  Some people have an upset stomach after surgery. This is often because of anesthesia, pain, or pain medicine, or the stress of surgery. These tips will help you handle nausea and eat healthy foods as you get better. If you were on a special food plan before surgery, ask your healthcare provider if you should follow it while you get better. These tips may help:  · Do not push yourself to eat. Your body will tell you when to eat and how much.  · Start off with clear liquids and soup. They are easier to digest.  · Next try semi-solid foods, such as mashed potatoes, applesauce, and gelatin, as you feel ready.  · Slowly move to solid foods. Dont eat fatty, rich, or spicy foods at first.  · Do not force yourself to have 3 large meals a day. Instead eat smaller amounts more often.  · Take pain medicines with a small amount of solid food, such as crackers or toast, to avoid nausea.     Call your surgeon if  · You still have pain an hour after taking medicine. The medicine may not be strong enough.  · You feel too sleepy, dizzy, or groggy. The medicine may be too strong.  · You have side effects like nausea, vomiting, or skin changes, such as rash, itching, or hives.       If you have obstructive sleep apnea  You were given anesthesia medicine during surgery to keep you comfortable and free of pain. After surgery, you may have more apnea spells because of this medicine and other medicines you were given. The spells may last longer than usual.   At home:  · Keep using the continuous positive airway pressure (CPAP) device when you sleep. Unless your healthcare provider tells you not  to, use it when you sleep, day or night. CPAP is a common device used to treat obstructive sleep apnea.  · Talk with your provider before taking any pain medicine, muscle relaxants, or sedatives. Your provider will tell you about the possible dangers of taking these medicines.  Date Last Reviewed: 12/1/2016 © 2000-2017 [employer]. [address] [place] [address], [place] [address]. All rights reserved. This information is not intended as a substitute for professional medical care. Always follow your healthcare professional's instructions.        Nasal Surgery: Your Recovery  Youve just had nasal surgery. During the first weeks after surgery, be sure to follow the advice of your doctor. The tips on this sheet can help speed your recovery.  Tips for healing  · Dont take medicines containing aspirin or ibuprofen.  · Don't bump your nose or touch the splint or packing.  · Don't bend or lift.  · Sneeze or cough with your mouth open to reduce pressure inside your nose.  · Keep from blowing your nose until youre told its OK to do so.  · Keep eyeglasses from resting on your nose by taping them above the nose.  · Protect your nose from the sun.  · After packing is removed, start saltwater rinses if prescribed.  · Keep follow-up appointments with your doctor.  Follow-up visits  Your doctor will most likely want to see you within a week to check your healing. Any packing, splint, or dressings will probably be removed at that time. You may feel slight discomfort and bleed a little when this is done.  Assessing the results  During later follow-up visits, your doctor will assess your healing and the results of your surgery. Talk with your doctor about any problems or concerns you may have.  When to call the doctor  Call the doctor if you notice any of the following:  · Sudden increase in pain, swelling, or bruising  · Fever  · Heavy bleeding  · Yellow or greenish drainage from the nose  · Unrelieved  headache  · Decreased or double vision  · Stiff neck or very tired feeling   Date Last Reviewed: 11/1/2016  © 8116-2192 [employer]. [address] [place] [address], [place] [address]. All rights reserved. This information is not intended as a substitute for professional medical care. Always follow your healthcare professional's instructions.

## 2019-02-07 NOTE — DISCHARGE SUMMARY
[facility] - Periop Services    Discharge Note        SUMMARY     Admit Date: 2/7/2019    Attending Physician: Michael Grubbs MD     Discharge Physician: Michael Grubbs MD    Discharge Date: 2/7/2019 2:02 PM      Hospital Course: Patient tolerated procedure well.     Disposition: Home or Self Care    Patient Instructions:   Current Discharge Medication List      CONTINUE these medications which have NOT CHANGED    Details   amLODIPine (NORVASC) 10 MG tablet Take 10 mg by mouth once daily.      cetirizine (ZYRTEC) 10 MG tablet Take 1 tablet (10 mg total) by mouth once daily.  Qty: 30 tablet, Refills: 0      dulaglutide (TRULICITY SUBQ) Inject 0.5 mg into the skin every 7 days.      ergocalciferol (VITAMIN D2) 50,000 unit Cap Take 50,000 Units by mouth every 7 days.      fluticasone (FLONASE) 50 mcg/actuation nasal spray 2 sprays by Each Nare route daily as needed for Rhinitis.      fluticasone-vilanterol (BREO ELLIPTA) 200-25 mcg/dose DsDv diskus inhaler Inhale 1 puff into the lungs daily as needed. Controller      glimepiride (AMARYL) 2 MG tablet Take 2 mg by mouth before breakfast.      losartan-hydrochlorothiazide 100-25 mg (HYZAAR) 100-25 mg per tablet Take 1 tablet by mouth once daily.      metFORMIN (GLUCOPHAGE) 500 MG tablet Take 500 mg by mouth 2 (two) times daily with meals.      metoprolol tartrate (LOPRESSOR) 25 MG tablet Take 25 mg by mouth 2 (two) times daily.      paroxetine (PAXIL) 20 MG tablet Take 20 mg by mouth 2 (two) times daily.      potassium chloride (KLOR-CON) 20 mEq Pack Take 20 mEq by mouth 2 (two) times daily.      ranitidine (ZANTAC) 150 MG tablet Take 150 mg by mouth 2 (two) times daily.      cephALEXin (KEFLEX) 500 MG capsule Take 500 mg by mouth 4 (four) times daily.      HYDROcodone-acetaminophen (NORCO)  mg per tablet Take 1 tablet by mouth every 6 (six) hours as needed for Pain.      KCL IN INVERTED SUGAR 10%-NACL IV Inject into the vein.              Discharge Procedure Orders (must include Diet, Follow-up, Activity):  No discharge procedures on file.     Follow Up:  Follow up as scheduled.  Resume routine diet.  Activity as tolerated.

## 2019-02-07 NOTE — PLAN OF CARE
Patient awake and alert. VSS. IV intact and infusing. No complaints of pain or discomfort. Tolerating fluids without N/V. Neuro check complete. WNL.

## 2019-02-07 NOTE — TRANSFER OF CARE
"Anesthesia Transfer of Care Note    Patient: Emerald Pyle    Procedure(s) Performed: Procedure(s) (LRB):  FESS (FUNCTIONAL ENDOSCOPIC SINUS SURGERY) (N/A)  SEPTOPLASTY, NOSE (Bilateral)  REDUCTION, NASAL TURBINATE (Bilateral)    Patient location: PACU    Anesthesia Type: general    Transport from OR: Transported from OR on room air with adequate spontaneous ventilation    Post pain: adequate analgesia    Post assessment: no apparent anesthetic complications and tolerated procedure well    Post vital signs: stable    Level of consciousness: sedated and responds to stimulation    Nausea/Vomiting: no nausea/vomiting    Complications: none    Transfer of care protocol was followed      Last vitals:   Visit Vitals  /89 (BP Location: Left arm, Patient Position: Lying)   Pulse 80   Temp 37.2 °C (99 °F) (Oral)   Resp 20   Ht 5' 2" (1.575 m)   Wt 86.2 kg (190 lb)   SpO2 99%   Breastfeeding? No   BMI 34.75 kg/m²     "

## 2019-02-07 NOTE — ANESTHESIA POSTPROCEDURE EVALUATION
"Anesthesia Post Evaluation    Patient: Emerald Pyle    Procedure(s) Performed: Procedure(s) (LRB):  FESS (FUNCTIONAL ENDOSCOPIC SINUS SURGERY) (N/A)  SEPTOPLASTY, NOSE (Bilateral)  REDUCTION, NASAL TURBINATE (Bilateral)    Final Anesthesia Type: general  Patient location during evaluation: PACU  Patient participation: Yes- Able to Participate  Level of consciousness: awake and awake and alert  Post-procedure vital signs: reviewed and stable  Pain management: adequate  Airway patency: patent  PONV status at discharge: No PONV  Anesthetic complications: no      Cardiovascular status: blood pressure returned to baseline  Respiratory status: unassisted and spontaneous ventilation  Hydration status: euvolemic  Follow-up not needed.        Visit Vitals  BP (!) 159/88   Pulse 80   Temp 37.2 °C (99 °F) (Oral)   Resp 18   Ht 5' 2" (1.575 m)   Wt 86.2 kg (190 lb)   SpO2 97%   Breastfeeding? No   BMI 34.75 kg/m²       Pain/Constance Score: Pain Rating Prior to Med Admin: 7 (2/7/2019  2:40 PM)  Constance Score: 10 (2/7/2019  3:30 PM)        "

## 2019-02-07 NOTE — DISCHARGE SUMMARY
[facility] - Periop Services    Discharge Note        SUMMARY     Admit Date: 2/7/2019    Attending Physician: Michael Grubbs MD     Discharge Physician: Michael Grubbs MD    Discharge Date: 2/7/2019 2:01 PM      Hospital Course: Patient tolerated procedure well.     Disposition: Home or Self Care    Patient Instructions:   Current Discharge Medication List      CONTINUE these medications which have NOT CHANGED    Details   amLODIPine (NORVASC) 10 MG tablet Take 10 mg by mouth once daily.      cetirizine (ZYRTEC) 10 MG tablet Take 1 tablet (10 mg total) by mouth once daily.  Qty: 30 tablet, Refills: 0      dulaglutide (TRULICITY SUBQ) Inject 0.5 mg into the skin every 7 days.      ergocalciferol (VITAMIN D2) 50,000 unit Cap Take 50,000 Units by mouth every 7 days.      fluticasone (FLONASE) 50 mcg/actuation nasal spray 2 sprays by Each Nare route daily as needed for Rhinitis.      fluticasone-vilanterol (BREO ELLIPTA) 200-25 mcg/dose DsDv diskus inhaler Inhale 1 puff into the lungs daily as needed. Controller      glimepiride (AMARYL) 2 MG tablet Take 2 mg by mouth before breakfast.      losartan-hydrochlorothiazide 100-25 mg (HYZAAR) 100-25 mg per tablet Take 1 tablet by mouth once daily.      metFORMIN (GLUCOPHAGE) 500 MG tablet Take 500 mg by mouth 2 (two) times daily with meals.      metoprolol tartrate (LOPRESSOR) 25 MG tablet Take 25 mg by mouth 2 (two) times daily.      paroxetine (PAXIL) 20 MG tablet Take 20 mg by mouth 2 (two) times daily.      potassium chloride (KLOR-CON) 20 mEq Pack Take 20 mEq by mouth 2 (two) times daily.      ranitidine (ZANTAC) 150 MG tablet Take 150 mg by mouth 2 (two) times daily.      cephALEXin (KEFLEX) 500 MG capsule Take 500 mg by mouth 4 (four) times daily.      HYDROcodone-acetaminophen (NORCO)  mg per tablet Take 1 tablet by mouth every 6 (six) hours as needed for Pain.      KCL IN INVERTED SUGAR 10%-NACL IV Inject into the vein.              Discharge Procedure Orders (must include Diet, Follow-up, Activity):  No discharge procedures on file.     Follow Up:  Follow up as scheduled.  Resume routine diet.  Activity as tolerated.

## 2019-05-09 ENCOUNTER — HOSPITAL ENCOUNTER (EMERGENCY)
Facility: HOSPITAL | Age: 69
Discharge: HOME | End: 2019-05-09
Payer: MEDICARE

## 2019-05-09 VITALS
HEART RATE: 90 BPM | SYSTOLIC BLOOD PRESSURE: 200 MMHG | DIASTOLIC BLOOD PRESSURE: 95 MMHG | RESPIRATION RATE: 20 BRPM | OXYGEN SATURATION: 99 % | TEMPERATURE: 98 F

## 2019-05-09 DIAGNOSIS — R21 RASH: Primary | ICD-10-CM

## 2019-05-09 PROCEDURE — 99284 EMERGENCY DEPT VISIT MOD MDM: CPT

## 2019-05-09 RX ORDER — HYDROXYZINE HYDROCHLORIDE 25 MG/1
25 TABLET, FILM COATED ORAL 3 TIMES DAILY PRN
Qty: 15 TABLET | Refills: 0 | Status: SHIPPED | OUTPATIENT
Start: 2019-05-09 | End: 2019-08-08

## 2019-05-09 RX ORDER — PERMETHRIN 50 MG/G
CREAM TOPICAL
Qty: 60 G | Refills: 0 | Status: SHIPPED | OUTPATIENT
Start: 2019-05-09 | End: 2019-07-22

## 2019-05-09 NOTE — ED PROVIDER NOTES
Encounter Date: 2019       History   No chief complaint on file.    Emerald Pyle is a 68 y.o with PMHx including anxiety, arthritis, diabetes and hypertension. She presents to ED with complaint itchy, rash x 10 days after being confined on train  She is taking Benadryl and Hydrocortiosne cream with no improvement         Review of patient's allergies indicates:   Allergen Reactions    Ketorolac tromethamine Other (See Comments)     Other reaction(s): Burning    Moxifloxacin Other (See Comments)     Heart racing.    Prevacid [lansoprazole] Itching     Past Medical History:   Diagnosis Date    Anxiety     Arthritis     Chronic sinusitis     Diabetes mellitus     Hypertension      Past Surgical History:   Procedure Laterality Date    BREAST LUMPECTOMY Left     BUNIONECTOMY Bilateral     COLONOSCOPY      ESOPHAGOGASTRODUODENOSCOPY      ETHMOIDECTOMY Bilateral 2019    [facility]    FESS (FUNCTIONAL ENDOSCOPIC SINUS SURGERY) N/A 2019    [facility]    MAXILLARY ANTROSTOMY Bilateral 2019    [facility]    REDUCTION, NASAL TURBINATE Bilateral 2019    [facility]    SEPTOPLASTY, NOSE Bilateral 2019    [facility]    SINUS SURGERY      x4    TUBAL LIGATION      WRIST SURGERY Left      No family history on file.  Social History     Tobacco Use    Smoking status: Former Smoker     Packs/day: 0.75     Years: 25.00     Pack years: 18.75     Types: Cigarettes     Last attempt to quit: 1999     Years since quittin.0    Smokeless tobacco: Never Used   Substance Use Topics    Alcohol use: Yes     Frequency: Never     Comment: social    Drug use: No     Review of Systems   Constitutional: Negative for fever.   HENT: Negative for sore throat.    Respiratory: Negative for shortness of breath.     Cardiovascular: Negative for chest pain.   Gastrointestinal: Negative for nausea.   Genitourinary: Negative for dysuria.   Musculoskeletal: Negative for back pain.   Skin: Positive for rash.   Neurological: Negative for weakness.   Hematological: Does not bruise/bleed easily.   All other systems reviewed and are negative.      Physical Exam     Initial Vitals   BP Pulse Resp Temp SpO2   -- -- -- -- --      MAP       --         Physical Exam    Nursing note and vitals reviewed.  Constitutional: She appears well-developed and well-nourished.   HENT:   Head: Normocephalic.   Eyes: Pupils are equal, round, and reactive to light.   Neck: Normal range of motion.   Cardiovascular: Normal rate, regular rhythm and normal heart sounds.   Pulmonary/Chest: Breath sounds normal.   Musculoskeletal: Normal range of motion.   Neurological: She is alert and oriented to person, place, and time.   Skin: Skin is warm and dry. Rash noted.        Psychiatric: She has a normal mood and affect. Her behavior is normal. Judgment and thought content normal.         ED Course   Procedures  Labs Reviewed - No data to display       Imaging Results    None          Medical Decision Making:   Initial Assessment:   Patient with complaint itchy, rash x 10 days after being confined on train  She is taking Benadryl and Hydrocortiosne cream with no improvement         Differential Diagnosis:   Contact dermatitis  Bed bugs, scabies  Allergic reaction  shingles  ED Management:  Discussed physical exam findings with patient  No acute emergent medical condition identified at this time to warrant further testing/diagnostics.  Plan to discharge patient home with instructions per AVS.  Follow-up with PCP in 2-5 days or return to ED if symptoms worsen.  Patient verbalized agreement to discharge treatment plan.                      Clinical Impression:       ICD-10-CM ICD-9-CM   1. Rash R21 782.1                                Shania Prabhakar NP  05/09/19  [id number]

## 2019-07-22 ENCOUNTER — HOSPITAL ENCOUNTER (EMERGENCY)
Facility: HOSPITAL | Age: 69
Discharge: HOME | End: 2019-07-22
Attending: EMERGENCY MEDICINE
Payer: MEDICARE

## 2019-07-22 VITALS
BODY MASS INDEX: 33.31 KG/M2 | DIASTOLIC BLOOD PRESSURE: 85 MMHG | HEART RATE: 79 BPM | OXYGEN SATURATION: 99 % | SYSTOLIC BLOOD PRESSURE: 190 MMHG | HEIGHT: 63 IN | RESPIRATION RATE: 16 BRPM | WEIGHT: 188 LBS | TEMPERATURE: 99 F

## 2019-07-22 DIAGNOSIS — J06.9 BACTERIAL UPPER RESPIRATORY INFECTION: ICD-10-CM

## 2019-07-22 DIAGNOSIS — B96.89 BACTERIAL UPPER RESPIRATORY INFECTION: ICD-10-CM

## 2019-07-22 DIAGNOSIS — B37.0 THRUSH, ORAL: Primary | ICD-10-CM

## 2019-07-22 PROCEDURE — 99284 EMERGENCY DEPT VISIT MOD MDM: CPT

## 2019-07-22 RX ORDER — AMOXICILLIN AND CLAVULANATE POTASSIUM 875; 125 MG/1; MG/1
1 TABLET, FILM COATED ORAL 2 TIMES DAILY
Qty: 14 TABLET | Refills: 0 | Status: SHIPPED | OUTPATIENT
Start: 2019-07-22 | End: 2019-08-08

## 2019-07-22 RX ORDER — NYSTATIN 100000 [USP'U]/ML
500000 SUSPENSION ORAL 4 TIMES DAILY
Qty: 60 ML | Refills: 0 | Status: SHIPPED | OUTPATIENT
Start: 2019-07-22 | End: 2019-08-01

## 2019-07-29 NOTE — ED PROVIDER NOTES
"Encounter Date: 2019       History     Chief Complaint   Patient presents with    Thrush     on tongue     69yo female with pmh anxiety, osteoarthritis, chronic sinusitis, DM, and HTN presents to ED for evaluation of thrush on her tongue. States she has recently finished a course of antibiotics for sinusitis and occasionally "this happens." Denies fever, chills, sore throat, difficulty swallowing.         Review of patient's allergies indicates:   Allergen Reactions    Ketorolac tromethamine Other (See Comments)     Other reaction(s): Burning    Moxifloxacin Other (See Comments)     Heart racing.    Prevacid [lansoprazole] Itching     Past Medical History:   Diagnosis Date    Anxiety     Arthritis     Chronic sinusitis     Diabetes mellitus     Hypertension      Past Surgical History:   Procedure Laterality Date    BREAST LUMPECTOMY Left     BUNIONECTOMY Bilateral     COLONOSCOPY      ESOPHAGOGASTRODUODENOSCOPY      ETHMOIDECTOMY Bilateral 2019    [facility] OR    FESS (FUNCTIONAL ENDOSCOPIC SINUS SURGERY) N/A 2019    [facility] OR    MAXILLARY ANTROSTOMY Bilateral 2019    [facility] OR    REDUCTION, NASAL TURBINATE Bilateral 2019    [facility] OR    SEPTOPLASTY, NOSE Bilateral 2019    [facility] OR    SINUS SURGERY      x4    TUBAL LIGATION      WRIST SURGERY Left      History reviewed. No pertinent family history.  Social History     Tobacco Use    Smoking status: Former Smoker     Packs/day: 0.75     Years: 25.00     Pack years: 18.75     Types: Cigarettes     Last attempt to quit: 1999     Years since quittin.3    Smokeless tobacco: Never Used   Substance Use Topics    Alcohol use: Yes     Frequency: Never     Comment: social    Drug use: No     Review of Systems   Constitutional: Negative for " appetite change, chills, diaphoresis, fatigue and fever.   HENT: Negative for congestion, ear pain, mouth sores, rhinorrhea, sinus pressure, sinus pain, sore throat, tinnitus and trouble swallowing.         Thrush   Eyes: Negative for photophobia and visual disturbance.   Respiratory: Negative for cough, chest tightness, shortness of breath and wheezing.    Cardiovascular: Negative for chest pain, palpitations and leg swelling.   Gastrointestinal: Negative for abdominal pain, constipation, diarrhea, nausea and vomiting.   Endocrine: Negative for cold intolerance, heat intolerance, polydipsia, polyphagia and polyuria.   Genitourinary: Negative for decreased urine volume, difficulty urinating, dysuria, flank pain, frequency, hematuria and urgency.   Musculoskeletal: Negative for arthralgias, back pain, gait problem, joint swelling, myalgias, neck pain and neck stiffness.   Skin: Negative for color change, pallor, rash and wound.   Allergic/Immunologic: Negative for immunocompromised state.   Neurological: Negative for dizziness, syncope, weakness, light-headedness, numbness and headaches.   Hematological: Negative for adenopathy. Does not bruise/bleed easily.   Psychiatric/Behavioral: Negative for decreased concentration, dysphoric mood and sleep disturbance. The patient is not nervous/anxious.    All other systems reviewed and are negative.      Physical Exam     Initial Vitals [07/22/19 2142]   BP Pulse Resp Temp SpO2   (!) 190/85 79 16 98.5 °F (36.9 °C) 99 %      MAP       --         Physical Exam    Nursing note and vitals reviewed.  Constitutional: She appears well-developed and well-nourished. She is not diaphoretic. No distress.   HENT:   Head: Normocephalic and atraumatic.   Right Ear: External ear normal.   Left Ear: External ear normal.   Nose: Nose normal.   Mouth/Throat: Uvula is midline and oropharynx is clear and moist.   Thrush - tongue   Eyes: Conjunctivae are normal. Pupils are equal, round, and  reactive to light. No scleral icterus.   Neck: Normal range of motion. Neck supple.   Cardiovascular: Normal rate, regular rhythm, normal heart sounds and intact distal pulses.   Pulmonary/Chest: Breath sounds normal. No respiratory distress. She has no wheezes. She has no rhonchi. She has no rales. She exhibits no tenderness.   Abdominal: Soft. Bowel sounds are normal. She exhibits no distension. There is no tenderness. There is no rebound and no guarding.   Musculoskeletal: Normal range of motion. She exhibits no edema or tenderness.   Lymphadenopathy:     She has no cervical adenopathy.   Neurological: She is alert and oriented to person, place, and time. GCS score is 15. GCS eye subscore is 4. GCS verbal subscore is 5. GCS motor subscore is 6.   Skin: Skin is warm and dry. Capillary refill takes less than 2 seconds. No rash noted. No erythema. No pallor.   Psychiatric: She has a normal mood and affect. Her behavior is normal. Judgment and thought content normal.         ED Course   Procedures  Labs Reviewed - No data to display       Imaging Results    None          Medical Decision Making:   Differential Diagnosis:   Thrush                      Clinical Impression:       ICD-10-CM ICD-9-CM   1. Thrush, oral B37.0 112.0   2. Bacterial upper respiratory infection J06.9 465.9    B96.89 041.9         Disposition:   Disposition: Discharged  Condition: Stable                        Denice Varela MD  07/29/19 [id number]

## 2019-08-07 PROCEDURE — 96372 THER/PROPH/DIAG INJ SC/IM: CPT

## 2019-08-07 PROCEDURE — 99284 EMERGENCY DEPT VISIT MOD MDM: CPT | Mod: 25

## 2019-08-08 ENCOUNTER — HOSPITAL ENCOUNTER (EMERGENCY)
Facility: HOSPITAL | Age: 69
Discharge: HOME | End: 2019-08-08
Attending: EMERGENCY MEDICINE
Payer: MEDICARE

## 2019-08-08 VITALS
OXYGEN SATURATION: 100 % | RESPIRATION RATE: 16 BRPM | WEIGHT: 188.06 LBS | HEIGHT: 63 IN | TEMPERATURE: 99 F | BODY MASS INDEX: 33.32 KG/M2 | HEART RATE: 64 BPM | DIASTOLIC BLOOD PRESSURE: 76 MMHG | SYSTOLIC BLOOD PRESSURE: 159 MMHG

## 2019-08-08 DIAGNOSIS — J01.40 ACUTE PANSINUSITIS, RECURRENCE NOT SPECIFIED: Primary | ICD-10-CM

## 2019-08-08 PROCEDURE — 63600175 PHARM REV CODE 636 W HCPCS: Performed by: EMERGENCY MEDICINE

## 2019-08-08 PROCEDURE — 25000003 PHARM REV CODE 250: Performed by: EMERGENCY MEDICINE

## 2019-08-08 RX ORDER — ACETAMINOPHEN 325 MG/1
650 TABLET ORAL
Status: COMPLETED | OUTPATIENT
Start: 2019-08-08 | End: 2019-08-08

## 2019-08-08 RX ORDER — PREDNISONE 10 MG/1
30 TABLET ORAL DAILY
Qty: 9 TABLET | Refills: 0 | Status: SHIPPED | OUTPATIENT
Start: 2019-08-08 | End: 2019-08-11

## 2019-08-08 RX ORDER — DEXAMETHASONE SODIUM PHOSPHATE 100 MG/10ML
10 INJECTION INTRAMUSCULAR; INTRAVENOUS
Status: COMPLETED | OUTPATIENT
Start: 2019-08-08 | End: 2019-08-08

## 2019-08-08 RX ORDER — DOXYCYCLINE 100 MG/1
100 CAPSULE ORAL 2 TIMES DAILY
Qty: 20 CAPSULE | Refills: 0 | Status: SHIPPED | OUTPATIENT
Start: 2019-08-08 | End: 2019-08-18

## 2019-08-08 RX ADMIN — ACETAMINOPHEN 650 MG: 325 TABLET ORAL at 02:08

## 2019-08-08 RX ADMIN — DEXAMETHASONE SODIUM PHOSPHATE 10 MG: 10 INJECTION INTRAMUSCULAR; INTRAVENOUS at 01:08

## 2019-08-08 NOTE — DISCHARGE INSTRUCTIONS
Decadron 10 mg IM in emergency department  Prednisone 30 mg daily x3  Doxycycline 100 mg twice daily for 10  Follow-up primary care provider Jeanie

## 2019-08-08 NOTE — ED TRIAGE NOTES
"Pt c/o "my sinuses" x 1 month. Pt seen here for same within that time. Pt has not followed up with PCP. "My doctor's in [place]."  "

## 2019-08-09 ENCOUNTER — HOSPITAL ENCOUNTER (OUTPATIENT)
Dept: RADIOLOGY | Facility: HOSPITAL | Age: 69
Discharge: HOME | End: 2019-08-09
Attending: OTOLARYNGOLOGY
Payer: MEDICARE

## 2019-08-09 DIAGNOSIS — J32.9 CHRONIC SINUSITIS: Primary | ICD-10-CM

## 2019-08-09 DIAGNOSIS — J32.9 CHRONIC SINUSITIS: ICD-10-CM

## 2019-08-09 PROCEDURE — 70486 CT MAXILLOFACIAL W/O DYE: CPT | Mod: 26,,, | Performed by: RADIOLOGY

## 2019-08-09 PROCEDURE — 70486 CT MAXILLOFACIAL W/O DYE: CPT | Mod: TC

## 2019-08-09 PROCEDURE — 70486 CT SINUSES WITHOUT CONTRAST: ICD-10-PCS | Mod: 26,,, | Performed by: RADIOLOGY

## 2019-08-09 NOTE — ED PROVIDER NOTES
Encounter Date: 2019       History     Chief Complaint   Patient presents with    Sinusitis     68-year-old female complains of recurrence frontal and maxillary sinus pain and congestion with postnasal drip and mild sore throat    She denies any acute fever  Reports frontal headache positional in nature  Denies any bloody output  Denies any purulent cough for bloody sputum  Denies acute fever  Denies ear pain or mastoid pain  Denies change in mentation        Review of patient's allergies indicates:   Allergen Reactions    Ketorolac tromethamine Other (See Comments)     Other reaction(s): Burning    Moxifloxacin Other (See Comments)     Heart racing.    Prevacid [lansoprazole] Itching     Past Medical History:   Diagnosis Date    Anxiety     Arthritis     Chronic sinusitis     Diabetes mellitus     Hypertension      Past Surgical History:   Procedure Laterality Date    BREAST LUMPECTOMY Left     BUNIONECTOMY Bilateral     COLONOSCOPY      ESOPHAGOGASTRODUODENOSCOPY      ETHMOIDECTOMY Bilateral 2019    [facility]    FESS (FUNCTIONAL ENDOSCOPIC SINUS SURGERY) N/A 2019    [facility]    MAXILLARY ANTROSTOMY Bilateral 2019    [facility]    REDUCTION, NASAL TURBINATE Bilateral 2019    [facility]    SEPTOPLASTY, NOSE Bilateral 2019    [facility]    SINUS SURGERY      x4    TUBAL LIGATION      WRIST SURGERY Left      History reviewed. No pertinent family history.  Social History     Tobacco Use    Smoking status: Former Smoker     Packs/day: 0.75     Years: 25.00     Pack years: 18.75     Types: Cigarettes     Last attempt to quit: 1999     Years since quittin.3    Smokeless tobacco: Never Used   Substance Use Topics    Alcohol use: Yes     Frequency: Never     Comment: social    Drug use: No      Review of Systems   Constitutional: Negative.    HENT: Positive for congestion, postnasal drip, sinus pressure and sinus pain. Negative for ear pain, facial swelling, nosebleeds, rhinorrhea, sneezing, sore throat, tinnitus, trouble swallowing and voice change.    Eyes: Negative for discharge and visual disturbance.   Respiratory: Negative.    Cardiovascular: Negative.    Gastrointestinal: Negative.    Musculoskeletal: Negative.    Neurological: Positive for headaches (frontal ).   Hematological: Negative.    All other systems reviewed and are negative.      Physical Exam     Initial Vitals [08/07/19 2222]   BP Pulse Resp Temp SpO2   (!) 155/67 70 18 98.8 °F (37.1 °C) 98 %      MAP       --         Physical Exam    Nursing note and vitals reviewed.  Constitutional: She appears well-developed and well-nourished. She is not diaphoretic. No distress.   HENT:   Head: Normocephalic and atraumatic.   Nose: Mucosal edema and rhinorrhea present. Right sinus exhibits maxillary sinus tenderness and frontal sinus tenderness. Left sinus exhibits maxillary sinus tenderness and frontal sinus tenderness.   Mouth/Throat: Oropharynx is clear and moist. No oropharyngeal exudate.   Eyes: Conjunctivae and EOM are normal. Pupils are equal, round, and reactive to light. Right eye exhibits no discharge. Left eye exhibits no discharge. No scleral icterus.   Neck: Normal range of motion. Neck supple.   Cardiovascular: Normal rate, regular rhythm, normal heart sounds and intact distal pulses. Exam reveals no gallop and no friction rub.    No murmur heard.  Pulmonary/Chest: Breath sounds normal. No respiratory distress. She has no wheezes. She has no rhonchi. She has no rales.   Abdominal: Soft. Bowel sounds are normal. She exhibits no distension and no mass. There is no tenderness. There is no rebound and no guarding.   Musculoskeletal: Normal range of motion. She exhibits no edema or tenderness.   Lymphadenopathy:     She has no cervical  adenopathy.   Neurological: She is alert and oriented to person, place, and time. She has normal strength. No cranial nerve deficit or sensory deficit. GCS score is 15. GCS eye subscore is 4. GCS verbal subscore is 5. GCS motor subscore is 6.   Skin: Skin is warm and dry. Capillary refill takes less than 2 seconds. No rash noted. No erythema. No pallor.   Psychiatric: She has a normal mood and affect. Her behavior is normal. Judgment and thought content normal.         ED Course   Procedures  Labs Reviewed - No data to display       Imaging Results    None          Medical Decision Making:   ED Management:      Stable to discharge as per AVS    Decadron 10 mg IM in emergency department  Prednisone 30 mg daily x3  Doxycycline 100 mg twice daily for 10  Follow-up primary care provider Jeanie                      Clinical Impression:       ICD-10-CM ICD-9-CM   1. Acute pansinusitis, recurrence not specified J01.40 461.8         Disposition:   Disposition: Discharged  Condition: Stable                        Aditya Womakc MD  08/09/19 0448

## 2019-11-25 DIAGNOSIS — Z12.31 ENCOUNTER FOR SCREENING MAMMOGRAM FOR BREAST CANCER: ICD-10-CM

## 2019-11-25 DIAGNOSIS — Z13.820 SCREENING FOR OSTEOPOROSIS: ICD-10-CM

## 2019-11-25 DIAGNOSIS — R74.8 ABNORMAL LIVER ENZYMES: Primary | ICD-10-CM

## 2019-11-26 DIAGNOSIS — Z13.820 SCREENING FOR OSTEOPOROSIS: Primary | ICD-10-CM

## 2019-12-10 ENCOUNTER — APPOINTMENT (OUTPATIENT)
Dept: LAB | Facility: HOSPITAL | Age: 69
End: 2019-12-10
Attending: OTOLARYNGOLOGY
Payer: MEDICARE

## 2019-12-10 DIAGNOSIS — J34.3 HYPERTROPHY OF NASAL TURBINATES: Primary | ICD-10-CM

## 2019-12-10 PROCEDURE — 87186 SC STD MICRODIL/AGAR DIL: CPT

## 2019-12-10 PROCEDURE — 87070 CULTURE OTHR SPECIMN AEROBIC: CPT

## 2019-12-14 LAB — BACTERIA NPH AEROBE CULT: ABNORMAL

## 2019-12-17 ENCOUNTER — HOSPITAL ENCOUNTER (OUTPATIENT)
Dept: RADIOLOGY | Facility: HOSPITAL | Age: 69
Discharge: HOME | End: 2019-12-17
Attending: NURSE PRACTITIONER
Payer: MEDICARE

## 2019-12-17 VITALS — WEIGHT: 188.06 LBS | HEIGHT: 63 IN | BODY MASS INDEX: 33.32 KG/M2

## 2019-12-17 DIAGNOSIS — Z13.820 SCREENING FOR OSTEOPOROSIS: Primary | ICD-10-CM

## 2019-12-17 DIAGNOSIS — R74.8 ABNORMAL LIVER ENZYMES: ICD-10-CM

## 2019-12-17 DIAGNOSIS — Z12.31 ENCOUNTER FOR SCREENING MAMMOGRAM FOR BREAST CANCER: ICD-10-CM

## 2019-12-17 PROCEDURE — 77067 MAMMO DIGITAL SCREENING BILAT WITH TOMOSYNTHESIS_CAD: ICD-10-PCS | Mod: 26,,, | Performed by: RADIOLOGY

## 2019-12-17 PROCEDURE — 77063 MAMMO DIGITAL SCREENING BILAT WITH TOMOSYNTHESIS_CAD: ICD-10-PCS | Mod: 26,,, | Performed by: RADIOLOGY

## 2019-12-17 PROCEDURE — 77063 BREAST TOMOSYNTHESIS BI: CPT | Mod: 26,,, | Performed by: RADIOLOGY

## 2019-12-17 PROCEDURE — 77067 SCR MAMMO BI INCL CAD: CPT | Mod: 26,,, | Performed by: RADIOLOGY

## 2019-12-17 PROCEDURE — 77067 SCR MAMMO BI INCL CAD: CPT | Mod: TC

## 2019-12-17 PROCEDURE — 76705 ECHO EXAM OF ABDOMEN: CPT | Mod: TC

## 2019-12-17 PROCEDURE — 76705 US ABDOMEN LIMITED: ICD-10-PCS | Mod: 26,,, | Performed by: RADIOLOGY

## 2019-12-17 PROCEDURE — 76705 ECHO EXAM OF ABDOMEN: CPT | Mod: 26,,, | Performed by: RADIOLOGY

## 2019-12-18 ENCOUNTER — LAB VISIT (OUTPATIENT)
Dept: LAB | Facility: HOSPITAL | Age: 69
End: 2019-12-18
Attending: OTOLARYNGOLOGY
Payer: MEDICARE

## 2019-12-18 DIAGNOSIS — J32.4 CHRONIC PANSINUSITIS: Primary | ICD-10-CM

## 2019-12-18 LAB
ERYTHROCYTE [DISTWIDTH] IN BLOOD BY AUTOMATED COUNT: 13.2 % (ref 11.5–14.5)
HCT VFR BLD AUTO: 36.9 % (ref 37–48.5)
HGB BLD-MCNC: 11.7 G/DL (ref 12–16)
MCH RBC QN AUTO: 26.1 PG (ref 27–31)
MCHC RBC AUTO-ENTMCNC: 31.7 G/DL (ref 32–36)
MCV RBC AUTO: 82 FL (ref 82–98)
PLATELET # BLD AUTO: 415 K/UL (ref 150–350)
PMV BLD AUTO: 9.7 FL (ref 9.2–12.9)
RBC # BLD AUTO: 4.49 M/UL (ref 4–5.4)
WBC # BLD AUTO: 7.4 K/UL (ref 3.9–12.7)

## 2019-12-18 PROCEDURE — 36415 COLL VENOUS BLD VENIPUNCTURE: CPT

## 2019-12-18 PROCEDURE — 85027 COMPLETE CBC AUTOMATED: CPT

## 2019-12-19 ENCOUNTER — ANESTHESIA (OUTPATIENT)
Dept: SURGERY | Facility: HOSPITAL | Age: 69
End: 2019-12-19
Payer: MEDICARE

## 2019-12-19 ENCOUNTER — HOSPITAL ENCOUNTER (OUTPATIENT)
Facility: HOSPITAL | Age: 69
Discharge: HOME | End: 2019-12-19
Attending: OTOLARYNGOLOGY | Admitting: OTOLARYNGOLOGY
Payer: MEDICARE

## 2019-12-19 ENCOUNTER — ANESTHESIA EVENT (OUTPATIENT)
Dept: SURGERY | Facility: HOSPITAL | Age: 69
End: 2019-12-19
Payer: MEDICARE

## 2019-12-19 DIAGNOSIS — Z01.818 PRE-OP EXAM: ICD-10-CM

## 2019-12-19 LAB
ANION GAP SERPL CALC-SCNC: 12 MMOL/L (ref 8–16)
BUN SERPL-MCNC: 19 MG/DL (ref 8–23)
CALCIUM SERPL-MCNC: 9.4 MG/DL (ref 8.7–10.5)
CHLORIDE SERPL-SCNC: 99 MMOL/L (ref 95–110)
CO2 SERPL-SCNC: 23 MMOL/L (ref 23–29)
CREAT SERPL-MCNC: 1.3 MG/DL (ref 0.5–1.4)
EST. GFR  (AFRICAN AMERICAN): 49.1 ML/MIN/1.73 M^2
EST. GFR  (NON AFRICAN AMERICAN): 42.6 ML/MIN/1.73 M^2
GLUCOSE SERPL-MCNC: 149 MG/DL (ref 70–110)
POCT GLUCOSE: 112 MG/DL (ref 70–110)
POCT GLUCOSE: 120 MG/DL (ref 70–110)
POTASSIUM SERPL-SCNC: 3.9 MMOL/L (ref 3.5–5.1)
SODIUM SERPL-SCNC: 134 MMOL/L (ref 136–145)

## 2019-12-19 PROCEDURE — 63600175 PHARM REV CODE 636 W HCPCS: Performed by: OTOLARYNGOLOGY

## 2019-12-19 PROCEDURE — 71000033 HC RECOVERY, INTIAL HOUR: Performed by: OTOLARYNGOLOGY

## 2019-12-19 PROCEDURE — 25000003 PHARM REV CODE 250: Performed by: ANESTHESIOLOGY

## 2019-12-19 PROCEDURE — 93005 ELECTROCARDIOGRAM TRACING: CPT | Mod: 59

## 2019-12-19 PROCEDURE — 63600175 PHARM REV CODE 636 W HCPCS: Performed by: NURSE ANESTHETIST, CERTIFIED REGISTERED

## 2019-12-19 PROCEDURE — 36415 COLL VENOUS BLD VENIPUNCTURE: CPT

## 2019-12-19 PROCEDURE — 71000039 HC RECOVERY, EACH ADD'L HOUR: Performed by: OTOLARYNGOLOGY

## 2019-12-19 PROCEDURE — 63600175 PHARM REV CODE 636 W HCPCS: Performed by: ANESTHESIOLOGY

## 2019-12-19 PROCEDURE — 37000008 HC ANESTHESIA 1ST 15 MINUTES: Performed by: OTOLARYNGOLOGY

## 2019-12-19 PROCEDURE — 36000708 HC OR TIME LEV III 1ST 15 MIN: Performed by: OTOLARYNGOLOGY

## 2019-12-19 PROCEDURE — D9220A PRA ANESTHESIA: Mod: CRNA,,, | Performed by: NURSE ANESTHETIST, CERTIFIED REGISTERED

## 2019-12-19 PROCEDURE — S0028 INJECTION, FAMOTIDINE, 20 MG: HCPCS | Performed by: ANESTHESIOLOGY

## 2019-12-19 PROCEDURE — D9220A PRA ANESTHESIA: ICD-10-PCS | Mod: CRNA,,, | Performed by: NURSE ANESTHETIST, CERTIFIED REGISTERED

## 2019-12-19 PROCEDURE — 25000003 PHARM REV CODE 250: Performed by: OTOLARYNGOLOGY

## 2019-12-19 PROCEDURE — 36000709 HC OR TIME LEV III EA ADD 15 MIN: Performed by: OTOLARYNGOLOGY

## 2019-12-19 PROCEDURE — 80048 BASIC METABOLIC PNL TOTAL CA: CPT

## 2019-12-19 PROCEDURE — 37000009 HC ANESTHESIA EA ADD 15 MINS: Performed by: OTOLARYNGOLOGY

## 2019-12-19 PROCEDURE — 71000015 HC POSTOP RECOV 1ST HR: Performed by: OTOLARYNGOLOGY

## 2019-12-19 PROCEDURE — D9220A PRA ANESTHESIA: ICD-10-PCS | Mod: ANES,,, | Performed by: ANESTHESIOLOGY

## 2019-12-19 PROCEDURE — D9220A PRA ANESTHESIA: Mod: ANES,,, | Performed by: ANESTHESIOLOGY

## 2019-12-19 RX ORDER — SODIUM CHLORIDE, SODIUM LACTATE, POTASSIUM CHLORIDE, CALCIUM CHLORIDE 600; 310; 30; 20 MG/100ML; MG/100ML; MG/100ML; MG/100ML
INJECTION, SOLUTION INTRAVENOUS CONTINUOUS
Status: DISCONTINUED | OUTPATIENT
Start: 2019-12-19 | End: 2019-12-19 | Stop reason: HOSPADM

## 2019-12-19 RX ORDER — FAMOTIDINE 10 MG/ML
20 INJECTION INTRAVENOUS ONCE
Status: COMPLETED | OUTPATIENT
Start: 2019-12-19 | End: 2019-12-19

## 2019-12-19 RX ORDER — BACITRACIN 500 [USP'U]/G
OINTMENT TOPICAL
Status: DISCONTINUED | OUTPATIENT
Start: 2019-12-19 | End: 2019-12-19 | Stop reason: HOSPADM

## 2019-12-19 RX ORDER — MEPERIDINE HYDROCHLORIDE 50 MG/ML
INJECTION INTRAMUSCULAR; INTRAVENOUS; SUBCUTANEOUS
Status: DISCONTINUED | OUTPATIENT
Start: 2019-12-19 | End: 2019-12-19

## 2019-12-19 RX ORDER — SODIUM CHLORIDE, SODIUM LACTATE, POTASSIUM CHLORIDE, CALCIUM CHLORIDE 600; 310; 30; 20 MG/100ML; MG/100ML; MG/100ML; MG/100ML
125 INJECTION, SOLUTION INTRAVENOUS CONTINUOUS
Status: DISCONTINUED | OUTPATIENT
Start: 2019-12-19 | End: 2019-12-19 | Stop reason: HOSPADM

## 2019-12-19 RX ORDER — LIDOCAINE HYDROCHLORIDE 10 MG/ML
1 INJECTION, SOLUTION EPIDURAL; INFILTRATION; INTRACAUDAL; PERINEURAL ONCE
Status: DISCONTINUED | OUTPATIENT
Start: 2019-12-19 | End: 2019-12-19 | Stop reason: HOSPADM

## 2019-12-19 RX ORDER — ONDANSETRON 2 MG/ML
4 INJECTION INTRAMUSCULAR; INTRAVENOUS DAILY PRN
Status: DISCONTINUED | OUTPATIENT
Start: 2019-12-19 | End: 2019-12-19 | Stop reason: HOSPADM

## 2019-12-19 RX ORDER — HYDROCODONE BITARTRATE AND ACETAMINOPHEN 10; 325 MG/1; MG/1
1 TABLET ORAL
COMMUNITY
End: 2020-05-13 | Stop reason: CLARIF

## 2019-12-19 RX ORDER — MORPHINE SULFATE 4 MG/ML
2 INJECTION, SOLUTION INTRAMUSCULAR; INTRAVENOUS EVERY 5 MIN PRN
Status: DISCONTINUED | OUTPATIENT
Start: 2019-12-19 | End: 2019-12-19 | Stop reason: HOSPADM

## 2019-12-19 RX ORDER — CEFTRIAXONE 1 G/1
INJECTION, POWDER, FOR SOLUTION INTRAMUSCULAR; INTRAVENOUS
Status: DISCONTINUED | OUTPATIENT
Start: 2019-12-19 | End: 2019-12-19 | Stop reason: HOSPADM

## 2019-12-19 RX ORDER — ONDANSETRON HYDROCHLORIDE 8 MG/1
TABLET, FILM COATED ORAL EVERY 8 HOURS PRN
COMMUNITY
End: 2021-10-29

## 2019-12-19 RX ORDER — LIDOCAINE HYDROCHLORIDE AND EPINEPHRINE 10; 10 MG/ML; UG/ML
INJECTION, SOLUTION INFILTRATION; PERINEURAL
Status: DISCONTINUED | OUTPATIENT
Start: 2019-12-19 | End: 2019-12-19 | Stop reason: HOSPADM

## 2019-12-19 RX ORDER — PROPOFOL 10 MG/ML
VIAL (ML) INTRAVENOUS
Status: DISCONTINUED | OUTPATIENT
Start: 2019-12-19 | End: 2019-12-19

## 2019-12-19 RX ORDER — HYDROCODONE BITARTRATE AND ACETAMINOPHEN 5; 325 MG/1; MG/1
TABLET ORAL
Status: DISCONTINUED
Start: 2019-12-19 | End: 2019-12-19 | Stop reason: HOSPADM

## 2019-12-19 RX ORDER — CEFDINIR 300 MG/1
300 CAPSULE ORAL 2 TIMES DAILY
COMMUNITY
End: 2021-07-28

## 2019-12-19 RX ORDER — SUCCINYLCHOLINE CHLORIDE 20 MG/ML
INJECTION INTRAMUSCULAR; INTRAVENOUS
Status: DISCONTINUED | OUTPATIENT
Start: 2019-12-19 | End: 2019-12-19

## 2019-12-19 RX ORDER — FAMOTIDINE 10 MG/ML
INJECTION INTRAVENOUS
Status: DISCONTINUED
Start: 2019-12-19 | End: 2019-12-19 | Stop reason: HOSPADM

## 2019-12-19 RX ORDER — BUDESONIDE 0.25 MG/2ML
INHALANT ORAL
Status: DISCONTINUED
Start: 2019-12-19 | End: 2019-12-19 | Stop reason: HOSPADM

## 2019-12-19 RX ORDER — OXYMETAZOLINE HCL 0.05 %
SPRAY, NON-AEROSOL (ML) NASAL
Status: DISCONTINUED | OUTPATIENT
Start: 2019-12-19 | End: 2019-12-19 | Stop reason: HOSPADM

## 2019-12-19 RX ORDER — DIPHENHYDRAMINE HYDROCHLORIDE 50 MG/ML
12.5 INJECTION INTRAMUSCULAR; INTRAVENOUS
Status: DISCONTINUED | OUTPATIENT
Start: 2019-12-19 | End: 2019-12-19 | Stop reason: HOSPADM

## 2019-12-19 RX ORDER — BUDESONIDE 0.25 MG/2ML
INHALANT ORAL
Status: DISCONTINUED
Start: 2019-12-19 | End: 2019-12-19 | Stop reason: WASHOUT

## 2019-12-19 RX ORDER — HYDROCODONE BITARTRATE AND ACETAMINOPHEN 5; 325 MG/1; MG/1
1 TABLET ORAL ONCE
Status: COMPLETED | OUTPATIENT
Start: 2019-12-19 | End: 2019-12-19

## 2019-12-19 RX ORDER — MIDAZOLAM HYDROCHLORIDE 1 MG/ML
INJECTION, SOLUTION INTRAMUSCULAR; INTRAVENOUS
Status: DISCONTINUED | OUTPATIENT
Start: 2019-12-19 | End: 2019-12-19

## 2019-12-19 RX ORDER — ONDANSETRON 2 MG/ML
INJECTION INTRAMUSCULAR; INTRAVENOUS
Status: DISCONTINUED | OUTPATIENT
Start: 2019-12-19 | End: 2019-12-19

## 2019-12-19 RX ADMIN — PROPOFOL 200 MG: 10 INJECTION, EMULSION INTRAVENOUS at 02:12

## 2019-12-19 RX ADMIN — MIDAZOLAM HYDROCHLORIDE 2 MG: 1 INJECTION, SOLUTION INTRAMUSCULAR; INTRAVENOUS at 02:12

## 2019-12-19 RX ADMIN — ONDANSETRON 4 MG: 2 INJECTION INTRAMUSCULAR; INTRAVENOUS at 03:12

## 2019-12-19 RX ADMIN — MEPERIDINE HYDROCHLORIDE 25 MG: 50 INJECTION INTRAMUSCULAR; INTRAVENOUS; SUBCUTANEOUS at 02:12

## 2019-12-19 RX ADMIN — HYDROCODONE BITARTRATE AND ACETAMINOPHEN 1 TABLET: 5; 325 TABLET ORAL at 04:12

## 2019-12-19 RX ADMIN — SODIUM CHLORIDE, POTASSIUM CHLORIDE, SODIUM LACTATE AND CALCIUM CHLORIDE: 600; 310; 30; 20 INJECTION, SOLUTION INTRAVENOUS at 02:12

## 2019-12-19 RX ADMIN — FAMOTIDINE 20 MG: 10 INJECTION INTRAVENOUS at 12:12

## 2019-12-19 RX ADMIN — MEPERIDINE HYDROCHLORIDE 25 MG: 50 INJECTION INTRAMUSCULAR; INTRAVENOUS; SUBCUTANEOUS at 03:12

## 2019-12-19 RX ADMIN — SODIUM CHLORIDE, POTASSIUM CHLORIDE, SODIUM LACTATE AND CALCIUM CHLORIDE: 600; 310; 30; 20 INJECTION, SOLUTION INTRAVENOUS at 12:12

## 2019-12-19 RX ADMIN — SUCCINYLCHOLINE CHLORIDE 120 MG: 20 INJECTION, SOLUTION INTRAMUSCULAR; INTRAVENOUS at 02:12

## 2019-12-19 RX ADMIN — SODIUM CHLORIDE, POTASSIUM CHLORIDE, SODIUM LACTATE AND CALCIUM CHLORIDE 125 ML/HR: 600; 310; 30; 20 INJECTION, SOLUTION INTRAVENOUS at 03:12

## 2019-12-19 NOTE — ANESTHESIA PREPROCEDURE EVALUATION
12/19/2019  Emerald Pyle is a 69 y.o., female.    Anesthesia Evaluation    I have reviewed the Patient Summary Reports.    I have reviewed the Nursing Notes.   I have reviewed the Medications.     Review of Systems  Social:  Former Smoker    Hematology/Oncology:  Hematology Normal   Oncology Normal     EENT/Dental:EENT/Dental Normal   Cardiovascular:   Hypertension    Pulmonary:  Pulmonary Normal    Renal/:  Renal/ Normal     Hepatic/GI:  Hepatic/GI Normal    Musculoskeletal:  Musculoskeletal Normal    Neurological:  Neurology Normal    Endocrine:   Diabetes    Psych:  Psychiatric Normal           Physical Exam  General:  Well nourished    Airway/Jaw/Neck:  Airway Findings: Mouth Opening: Normal Tongue: Normal  General Airway Assessment: Adult  Mallampati: II  TM Distance: Normal, at least 6 cm      Dental:  Dental Findings: Edentulous   Chest/Lungs:  Chest/Lungs Findings: Clear to auscultation     Heart/Vascular:  Heart Findings: Rate: Normal  Rhythm: Regular Rhythm        Mental Status:  Mental Status Findings:  Cooperative, Alert and Oriented         Anesthesia Plan  Type of Anesthesia, risks & benefits discussed:  Anesthesia Type:  general  Patient's Preference:   Intra-op Monitoring Plan: standard ASA monitors  Intra-op Monitoring Plan Comments:   Post Op Pain Control Plan: IV/PO Opioids PRN  Post Op Pain Control Plan Comments:   Induction:   IV  Beta Blocker:  Patient is on a Beta-Blocker and has received one dose within the past 24 hours (No further documentation required).       Informed Consent: Patient understands risks and agrees with Anesthesia plan.  Questions answered. Anesthesia consent signed with patient.  ASA Score: 3     Day of Surgery Review of History & Physical: I have interviewed and examined the patient. I have reviewed the patient's H&P dated:            Ready For Surgery  From Anesthesia Perspective.

## 2019-12-19 NOTE — ANESTHESIA POSTPROCEDURE EVALUATION
Anesthesia Post Evaluation    Patient: Emerald Pyle    Procedure(s) Performed: Procedure(s) (LRB):  FESS (FUNCTIONAL ENDOSCOPIC SINUS SURGERY) (Bilateral)    Final Anesthesia Type: general    Patient location during evaluation: PACU  Patient participation: Yes- Able to Participate  Level of consciousness: awake and alert  Post-procedure vital signs: reviewed and stable  Pain management: adequate  Airway patency: patent    PONV status at discharge: No PONV  Anesthetic complications: no      Cardiovascular status: blood pressure returned to baseline  Respiratory status: unassisted  Hydration status: euvolemic  Follow-up not needed.          Vitals Value Taken Time   /88 12/19/2019 12:18 PM   Temp 36.6 °C (97.8 °F) 12/19/2019 12:18 PM   Pulse 93 12/19/2019  3:43 PM   Resp 25 12/19/2019  3:43 PM   SpO2 89 % 12/19/2019  3:43 PM   Vitals shown include unvalidated device data.      No case tracking events are documented in the log.      Pain/Constance Score: No data recorded

## 2019-12-19 NOTE — DISCHARGE SUMMARY
[facility] - Periop Services    Discharge Note        SUMMARY     Admit Date: 12/19/2019    Attending Physician: Michael Grubbs MD     Discharge Physician: Michael Grubbs MD    Discharge Date: 12/19/2019 4:18 PM      Hospital Course: Patient tolerated procedure well.     Disposition: Home or Self Care    Patient Instructions:   Current Discharge Medication List      CONTINUE these medications which have NOT CHANGED    Details   amLODIPine (NORVASC) 10 MG tablet Take 10 mg by mouth once daily.      cefdinir (OMNICEF) 300 MG capsule Take 300 mg by mouth 2 (two) times daily.      dulaglutide (TRULICITY SUBQ) Inject 0.5 mg into the skin every 7 days.      ergocalciferol (VITAMIN D2) 50,000 unit Cap Take 50,000 Units by mouth every 7 days.      fluticasone (FLONASE) 50 mcg/actuation nasal spray 2 sprays by Each Nare route daily as needed for Rhinitis.      fluticasone-vilanterol (BREO ELLIPTA) 200-25 mcg/dose DsDv diskus inhaler Inhale 1 puff into the lungs daily as needed. Controller      glimepiride (AMARYL) 2 MG tablet Take 2 mg by mouth before breakfast.      HYDROcodone-acetaminophen (NORCO)  mg per tablet Take 1 tablet by mouth.    Comments:       KCL IN INVERTED SUGAR 10%-NACL IV Inject into the vein.      losartan-hydrochlorothiazide 100-25 mg (HYZAAR) 100-25 mg per tablet Take 1 tablet by mouth once daily.      metFORMIN (GLUCOPHAGE) 500 MG tablet Take 500 mg by mouth 2 (two) times daily with meals.      metoprolol tartrate (LOPRESSOR) 25 MG tablet Take 25 mg by mouth 2 (two) times daily.      ondansetron (ZOFRAN) 8 MG tablet Take by mouth every 8 (eight) hours as needed for Nausea.      paroxetine (PAXIL) 20 MG tablet Take 20 mg by mouth 2 (two) times daily.      potassium chloride (KLOR-CON) 20 mEq Pack Take 20 mEq by mouth 2 (two) times daily.      cetirizine (ZYRTEC) 10 MG tablet Take 1 tablet (10 mg total) by mouth once daily.  Qty: 30 tablet, Refills: 0         STOP  taking these medications       ranitidine (ZANTAC) 150 MG tablet Comments:   Reason for Stopping:               Discharge Procedure Orders (must include Diet, Follow-up, Activity):  No discharge procedures on file.     Follow Up:  Follow up as scheduled.  Resume routine diet.  Activity as tolerated.

## 2019-12-19 NOTE — TRANSFER OF CARE
"Anesthesia Transfer of Care Note    Patient: Emerald Pyle    Procedure(s) Performed: Procedure(s) (LRB):  FESS (FUNCTIONAL ENDOSCOPIC SINUS SURGERY) (Bilateral)    Patient location: PACU    Anesthesia Type: general    Transport from OR: Transported from OR on room air with adequate spontaneous ventilation    Post pain: adequate analgesia    Post assessment: no apparent anesthetic complications and tolerated procedure well    Post vital signs: stable    Level of consciousness: awake, alert and oriented    Nausea/Vomiting: no nausea/vomiting    Complications: none    Transfer of care protocol was followed      Last vitals:   Visit Vitals  BP (!) 153/88 (BP Location: Left arm, Patient Position: Lying)   Pulse 75   Temp 36.6 °C (97.8 °F) (Oral)   Resp 12   Ht 5' 3" (1.6 m)   Wt 83.9 kg (185 lb)   SpO2 100%   Breastfeeding? No   BMI 32.77 kg/m²     "

## 2019-12-19 NOTE — DISCHARGE INSTRUCTIONS
[facility]   [phone]  [facility]      [phone]    Managing nausea    Some people have an upset stomach after surgery. This is often because of anesthesia, pain, or pain medicine, or the stress of surgery. These tips will help you handle nausea and eat healthy foods as you get better. If you were on a special food plan before surgery, ask your healthcare provider if you should follow it while you get better. These tips may help:  · Do not push yourself to eat. Your body will tell you when to eat and how much.  · Start off with clear liquids and soup. They are easier to digest.  · Next try semi-solid foods, such as mashed potatoes, applesauce, and gelatin, as you feel ready.  · Slowly move to solid foods. Dont eat fatty, rich, or spicy foods at first.  · Do not force yourself to have 3 large meals a day. Instead eat smaller amounts more often.  · Take pain medicines with a small amount of solid food, such as crackers or toast, to avoid nausea.

## 2019-12-19 NOTE — PLAN OF CARE
to bedside. Oral areas of concern assessed by MD. Confirmed with pt and daughter areas are of normal finding of surgical procedure from today. Both v/u. No further questions voiced.

## 2019-12-20 NOTE — OP NOTE
DATE OF PROCEDURE:  12/19/2019    PREOPERATIVE DIAGNOSIS:  Chronic sinusitis.    POSTOPERATIVE DIAGNOSIS:  Chronic sinusitis.    PROCEDURES PERFORMED:  1.  Bilateral endoscopic anterior and posterior ethmoidectomy.  2.  Bilateral endoscopic maxillary sinus antrostomy.  3.  Bilateral endoscopic sphenoidotomy.    ANESTHESIA:  General endotracheal.    SURGEON:  Michael Grubbs Jr., M.D.    DESCRIPTION OF PROCEDURE:  The patient was taken to the operating room and  placed in the supine position.  An IV was placed into the patient's arm.   After satisfactory general anesthesia had been obtained, the procedure was  begun.  Pledgets of 4 mL of 4% cocaine were placed into the patient's nose.   The patient's nose was injected with lidocaine 1% with epinephrine  1:100,000; approximately 15 mL were used.  The patient was prepped and  draped in standard fashion for a nasal operation.    Under endoscopic vision, the Aurora was walked down the lateral nasal wall  of the right nostril until it fell onto the anterior ethmoid cells.  The  anterior ethmoid cells were exenterated back to the lamina ethmoidalis.   The lamina ethmoidalis was breached, and the posterior ethmoid cells  exenterated back to the rostrum of the sphenoid.  Under endoscopic vision,  the sphenoid ostium was viewed and palpated and opened to approximately 5  mm in diameter.  The 30 degree scope was taken and the maxillary sinus  viewed and palpated and the ostium opened to approximately 1 cm in  diameter.  With the 70 degree scope, the nasofrontal duct area was viewed  and the anterior ethmoid cells taken down until the nasofrontal duct was  seen to empty widely into the nose.  This region was then packed with 2  cocaine pledgets and attention turned to the left side.    The Aurora was walked down the lateral nasal wall until it fell into the  anterior ethmoid cells.  The anterior ethmoid cells were exenterated back  to the lamina ethmoidalis.  The lamina  ethmoidalis was breached and the  posterior ethmoid cells exenterated back to the rostrum of the sphenoid.   The 0 degree scope was taken and the sphenoid ostium viewed and palpated.   This was opened to approximately 5 mm in diameter.  The 30 degree scope was  taken and the maxillary sinus ostium viewed and palpated and opened to  approximately 1 cm in diameter.  The 70 degree scope was taken and the  nasofrontal duct area viewed and the anterior ethmoid cells in this region  were taken down until the nasofrontal duct was seen to empty widely into  the nose.  The left airway was then packed with cocaine pledgets.  The  cocaine pledgets were then removed, and the nose suctioned free of any  recurrent bleeding.  The nose was filled with Cortisporin ointment.  The  patient was awakened and taken to the recovery room in stable condition.        ROGELIO dd: 12/19/2019 16:18:05 (CST)   td: 12/19/2019 19:37:55 (CST)  Doc ID #[id number]   Job ID #[id number]    CC:

## 2020-01-02 VITALS
BODY MASS INDEX: 32.78 KG/M2 | HEART RATE: 82 BPM | TEMPERATURE: 98 F | DIASTOLIC BLOOD PRESSURE: 63 MMHG | HEIGHT: 63 IN | WEIGHT: 185 LBS | OXYGEN SATURATION: 97 % | SYSTOLIC BLOOD PRESSURE: 116 MMHG | RESPIRATION RATE: 18 BRPM

## 2020-01-31 DIAGNOSIS — G50.0 TRIGEMINAL NEURALGIA: ICD-10-CM

## 2020-01-31 DIAGNOSIS — R51.9 HEADACHE: Primary | ICD-10-CM

## 2020-02-04 ENCOUNTER — HOSPITAL ENCOUNTER (OUTPATIENT)
Dept: RADIOLOGY | Facility: HOSPITAL | Age: 70
Discharge: HOME | End: 2020-02-04
Attending: OTOLARYNGOLOGY
Payer: MEDICARE

## 2020-02-04 DIAGNOSIS — R51.9 HEADACHE: ICD-10-CM

## 2020-02-04 DIAGNOSIS — G50.0 TRIGEMINAL NEURALGIA: ICD-10-CM

## 2020-02-04 PROCEDURE — 70553 MRI BRAIN STEM W/O & W/DYE: CPT | Mod: 26,,, | Performed by: RADIOLOGY

## 2020-02-04 PROCEDURE — 70553 MRI BRAIN STEM W/O & W/DYE: CPT | Mod: TC

## 2020-02-04 PROCEDURE — 70553 MRI BRAIN W WO CONTRAST: ICD-10-PCS | Mod: 26,,, | Performed by: RADIOLOGY

## 2020-02-04 PROCEDURE — A9585 GADOBUTROL INJECTION: HCPCS | Performed by: OTOLARYNGOLOGY

## 2020-02-04 PROCEDURE — 25500020 PHARM REV CODE 255: Performed by: OTOLARYNGOLOGY

## 2020-02-04 RX ORDER — GADOBUTROL 604.72 MG/ML
8 INJECTION INTRAVENOUS
Status: COMPLETED | OUTPATIENT
Start: 2020-02-04 | End: 2020-02-04

## 2020-02-04 RX ADMIN — GADOBUTROL 8 ML: 604.72 INJECTION INTRAVENOUS at 02:02

## 2020-05-13 ENCOUNTER — HOSPITAL ENCOUNTER (EMERGENCY)
Facility: HOSPITAL | Age: 70
Discharge: HOME | End: 2020-05-13
Attending: INTERNAL MEDICINE
Payer: MEDICARE

## 2020-05-13 VITALS
HEART RATE: 83 BPM | OXYGEN SATURATION: 97 % | RESPIRATION RATE: 20 BRPM | SYSTOLIC BLOOD PRESSURE: 172 MMHG | DIASTOLIC BLOOD PRESSURE: 75 MMHG | HEIGHT: 62 IN | BODY MASS INDEX: 34.23 KG/M2 | TEMPERATURE: 99 F | WEIGHT: 186 LBS

## 2020-05-13 DIAGNOSIS — S39.012A STRAIN OF LUMBAR REGION, INITIAL ENCOUNTER: ICD-10-CM

## 2020-05-13 DIAGNOSIS — M54.50 ACUTE LEFT-SIDED LOW BACK PAIN WITHOUT SCIATICA: Primary | ICD-10-CM

## 2020-05-13 PROCEDURE — 99284 EMERGENCY DEPT VISIT MOD MDM: CPT

## 2020-05-13 RX ORDER — MELOXICAM 15 MG/1
15 TABLET ORAL DAILY
Qty: 7 TABLET | Refills: 0 | Status: SHIPPED | OUTPATIENT
Start: 2020-05-13 | End: 2020-05-20

## 2020-05-13 RX ORDER — TRAMADOL HYDROCHLORIDE 50 MG/1
50 TABLET ORAL EVERY 6 HOURS PRN
Qty: 12 TABLET | Refills: 0 | OUTPATIENT
Start: 2020-05-13 | End: 2021-10-22

## 2020-05-13 NOTE — ED PROVIDER NOTES
"Encounter Date: 5/13/2020       History     Chief Complaint   Patient presents with    Back Pain     Patient complaining of lower back pain after turning in bed a few nights ago.     68yo BF w/ c/o back pain x2 days s/p "moving funny" the other night while in bed -- described as moderate currently, worsens w/ movement, bending & twisting, denies sig allev factors,     Denies fever, headache, dizziness, syncope, vision changes, neck pain, painful/difficult swallowing, chest pain, shortness breath, cough, abdominal pain, nausea/vomiting/diarrhea, hematuria/dysuria    No previous evaluation has been performed nor has PCP been contacted for today's concerns    Past medical/surgical history, allergies & current medications reviewed with patient    Known SARS-CoV2 exposure:  No  Smokes:  Yes/No      The history is provided by the patient. No  was used.     Review of patient's allergies indicates:   Allergen Reactions    Ketorolac tromethamine Other (See Comments)     Other reaction(s): Burning    Moxifloxacin Other (See Comments)     Heart racing.    Prevacid [lansoprazole] Itching     Past Medical History:   Diagnosis Date    Anxiety     Arthritis     Chronic sinusitis     Diabetes mellitus     Hypertension      Past Surgical History:   Procedure Laterality Date    BREAST BIOPSY      BREAST LUMPECTOMY Left     BUNIONECTOMY Bilateral     COLONOSCOPY      ESOPHAGOGASTRODUODENOSCOPY      ETHMOIDECTOMY Bilateral 2/7/2019    Procedure: ETHMOIDECTOMY;  Surgeon: Michael Grubbs MD;  Location: [facility] OR;  Service: ENT;  Laterality: Bilateral;  endoscopic anterior and posterior    FUNCTIONAL ENDOSCOPIC SINUS SURGERY (FESS) N/A 2/7/2019    Procedure: FESS (FUNCTIONAL ENDOSCOPIC SINUS SURGERY);  Surgeon: Michael Grubbs MD;  Location: [facility] OR;  Service: ENT;  Laterality: N/A;    FUNCTIONAL ENDOSCOPIC SINUS SURGERY (FESS) Bilateral 12/19/2019    Procedure: FESS (FUNCTIONAL ENDOSCOPIC SINUS " SURGERY);  Surgeon: Michael Grubbs MD;  Location: [facility] OR;  Service: ENT;  Laterality: Bilateral;    MAXILLARY ANTROSTOMY Bilateral 2019    Procedure: MAXILLARY ANTROSTOMY;  Surgeon: Michael Grubbs MD;  Location: [facility] OR;  Service: ENT;  Laterality: Bilateral;    NASAL SEPTOPLASTY Bilateral 2019    Procedure: SEPTOPLASTY, NOSE;  Surgeon: Michael Grubbs MD;  Location: [facility] OR;  Service: ENT;  Laterality: Bilateral;    NASAL TURBINATE REDUCTION Bilateral 2019    Procedure: REDUCTION, NASAL TURBINATE;  Surgeon: Michael Grubbs MD;  Location: [facility] OR;  Service: ENT;  Laterality: Bilateral;    SINUS SURGERY      x4    TUBAL LIGATION      WRIST SURGERY Left      Family History   Problem Relation Age of Onset    Breast cancer Neg Hx      Social History     Tobacco Use    Smoking status: Former Smoker     Packs/day: 0.75     Years: 25.00     Pack years: 18.75     Types: Cigarettes     Last attempt to quit: 1999     Years since quittin.1    Smokeless tobacco: Never Used   Substance Use Topics    Alcohol use: Yes     Frequency: Never     Comment: social    Drug use: No     Review of Systems   Constitutional: Negative for fever.   HENT: Negative for sore throat.    Eyes: Negative for visual disturbance.   Respiratory: Negative for shortness of breath.    Cardiovascular: Negative for chest pain.   Gastrointestinal: Negative for nausea.   Genitourinary: Negative for dysuria.   Musculoskeletal: Positive for back pain and myalgias. Negative for gait problem (patient observed ambulating through the ER on her own accord without difficulty) and neck pain.   Skin: Negative for rash.   Neurological: Negative for weakness and headaches.   Hematological: Does not bruise/bleed easily.   All other systems reviewed and are negative.      Physical Exam     Initial Vitals [20 1543]   BP Pulse Resp Temp SpO2   (!) 172/75 83 20 98.7 °F (37.1 °C) 97 %      MAP       --         Physical  Exam    Nursing note and vitals reviewed.  Constitutional: She appears well-developed. She is not diaphoretic. No distress.   Afebrile, vital signs stable   HENT:   Head: Normocephalic.   Right Ear: External ear normal.   Left Ear: External ear normal.   Nose: Nose normal.   Eyes: Lids are normal.   Neck: Neck supple.   Patient able to turn her head left and right 45° without pain, denies pain with palpation of C-spine   Cardiovascular: Normal rate.   Pulmonary/Chest: Effort normal and breath sounds normal. No respiratory distress.   Abdominal: She exhibits no distension.   Musculoskeletal:        Cervical back: Normal.        Thoracic back: Normal.        Lumbar back: She exhibits tenderness (mild to left paraspinal muscles, reproducible). She exhibits normal range of motion, no bony tenderness, no pain and no spasm.        Back:    Neurological: She is alert.   Skin: No rash noted.   Psychiatric: She has a normal mood and affect.         ED Course   Procedures  Labs Reviewed - No data to display       Imaging Results    None          Medical Decision Making:   ED Management:  Findings and plan of care discussed with patient:  Low back pain/strain; will discharge patient with Mobic and Ultram, care instructions given -- further instructions given to follow-up with PCP    All questions answered, strict return precautions given, patient verbalized understanding to all instructions, pleasant visit -- vital signs stable, patient is in no distress at discharge    Patient gives permission to discuss findings and plan of care with family/friend present    MS/LA  reviewed x1 year -- last: 01/23/2020  ACETAMINOPHEN-COD #3 TABLET  20.0     We discussed the pros, cons and risks of opiate use, patient verbally acknowledged the risks of opioid/controlled substance use, patient instructed to read the information given with the medication by the pharmacy prior to taking the 1st dose.    Disclaimer:  This note was prepared with  MModal Naturally Speaking voice recognition transcription software. Garbled syntax, mangled pronouns, and other bizarre constructions may be attributed to that software system.                                   Clinical Impression:       ICD-10-CM ICD-9-CM   1. Acute left-sided low back pain without sciatica M54.5 724.2   2. Strain of lumbar region, initial encounter S39.012A 847.2             ED Disposition Condition    Discharge Stable        ED Prescriptions     Medication Sig Dispense Start Date End Date Auth. Provider    meloxicam (MOBIC) 15 MG tablet Take 1 tablet (15 mg total) by mouth once daily. for 7 days 7 tablet 5/13/2020 5/20/2020 Mo Baca NP    traMADoL (ULTRAM) 50 mg tablet Take 1 tablet (50 mg total) by mouth every 6 (six) hours as needed for Pain. 12 tablet 5/13/2020  Mo Baca NP        Follow-up Information     Follow up With Specialties Details Why Contact Info    Uzair Ware NP Family Medicine Go to  in 2-3 days if symptoms not improving [address]  [place] [address]  [phone]                                       Mo Baca NP  05/13/20 [id number]

## 2020-05-13 NOTE — DISCHARGE INSTRUCTIONS
Do not drink alcohol, drive or operate heavy machinery while taking Ultram    Do not take OTC NSAIDs while on Mobic    Take all medications as prescribed.  Follow-up with your primary care provider as discussed.  Please remember that you had a visit to the emergency room today and this does not substitute as primary care services for ongoing management because emergency services is a snap shot in time.  Should you have any worsening condition that requires emergency services do not hesitate to return to the ER.    COVID-19 TESTING  IF YOU DESIRE TO BE TESTED, CALL TO SCHEDULE AN APPOINTMENT:  Hot Line [phone]  [address] [place] [address], [place] [address]  [facility]  Hours 8am-5pm Monday Through Friday

## 2020-05-14 ENCOUNTER — APPOINTMENT (OUTPATIENT)
Dept: LAB | Facility: HOSPITAL | Age: 70
End: 2020-05-14
Attending: OTOLARYNGOLOGY
Payer: MEDICARE

## 2020-05-14 DIAGNOSIS — J32.4 CHRONIC PANSINUSITIS: Primary | ICD-10-CM

## 2020-05-14 PROCEDURE — 87070 CULTURE OTHR SPECIMN AEROBIC: CPT

## 2020-05-17 LAB — BACTERIA NPH AEROBE CULT: NORMAL

## 2020-06-24 ENCOUNTER — HOSPITAL ENCOUNTER (EMERGENCY)
Facility: HOSPITAL | Age: 70
Discharge: HOME | End: 2020-06-24
Attending: INTERNAL MEDICINE
Payer: MEDICARE

## 2020-06-24 VITALS
TEMPERATURE: 99 F | BODY MASS INDEX: 33.31 KG/M2 | HEART RATE: 65 BPM | RESPIRATION RATE: 20 BRPM | HEIGHT: 62 IN | WEIGHT: 181 LBS | OXYGEN SATURATION: 99 % | SYSTOLIC BLOOD PRESSURE: 168 MMHG | DIASTOLIC BLOOD PRESSURE: 70 MMHG

## 2020-06-24 DIAGNOSIS — L73.9 FOLLICULITIS: Primary | ICD-10-CM

## 2020-06-24 LAB — POCT GLUCOSE: 133 MG/DL (ref 70–110)

## 2020-06-24 PROCEDURE — 99284 EMERGENCY DEPT VISIT MOD MDM: CPT | Mod: 25

## 2020-06-24 PROCEDURE — 82962 GLUCOSE BLOOD TEST: CPT

## 2020-06-24 RX ORDER — MUPIROCIN CALCIUM 20 MG/G
CREAM TOPICAL 3 TIMES DAILY
Qty: 1 TUBE | Refills: 2 | Status: SHIPPED | OUTPATIENT
Start: 2020-06-24 | End: 2023-06-05

## 2020-06-24 RX ORDER — SULFAMETHOXAZOLE AND TRIMETHOPRIM 800; 160 MG/1; MG/1
1 TABLET ORAL 2 TIMES DAILY
Qty: 14 TABLET | Refills: 0 | Status: SHIPPED | OUTPATIENT
Start: 2020-06-24 | End: 2020-07-01

## 2020-06-24 NOTE — ED PROVIDER NOTES
Encounter Date: 6/24/2020       History     Chief Complaint   Patient presents with    Abscess     Patient complaining of a vaginal abscess on left lip x3 days.     Patient comes in with a abscess near taken of the left labia.  She had this before.  It required drainage.    On examination the patient has a mild cellulitis.  She has no focal abscess.  Some redness and tenderness is noted.  No drainage discharge.        Review of patient's allergies indicates:   Allergen Reactions    Ketorolac tromethamine Other (See Comments)     Other reaction(s): Burning    Moxifloxacin Other (See Comments)     Heart racing.    Prevacid [lansoprazole] Itching     Past Medical History:   Diagnosis Date    Anxiety     Arthritis     Chronic sinusitis     Diabetes mellitus     Hypertension      Past Surgical History:   Procedure Laterality Date    BREAST BIOPSY      BREAST LUMPECTOMY Left     BUNIONECTOMY Bilateral     COLONOSCOPY      ESOPHAGOGASTRODUODENOSCOPY      ETHMOIDECTOMY Bilateral 2/7/2019    Procedure: ETHMOIDECTOMY;  Surgeon: Michael Grubbs MD;  Location: [facility] OR;  Service: ENT;  Laterality: Bilateral;  endoscopic anterior and posterior    FUNCTIONAL ENDOSCOPIC SINUS SURGERY (FESS) N/A 2/7/2019    Procedure: FESS (FUNCTIONAL ENDOSCOPIC SINUS SURGERY);  Surgeon: Michael Grubbs MD;  Location: [facility] OR;  Service: ENT;  Laterality: N/A;    FUNCTIONAL ENDOSCOPIC SINUS SURGERY (FESS) Bilateral 12/19/2019    Procedure: FESS (FUNCTIONAL ENDOSCOPIC SINUS SURGERY);  Surgeon: Michael Grubbs MD;  Location: [facility] OR;  Service: ENT;  Laterality: Bilateral;    MAXILLARY ANTROSTOMY Bilateral 2/7/2019    Procedure: MAXILLARY ANTROSTOMY;  Surgeon: Michael Grubbs MD;  Location: [facility] OR;  Service: ENT;  Laterality: Bilateral;    NASAL SEPTOPLASTY Bilateral 2/7/2019    Procedure: SEPTOPLASTY, NOSE;  Surgeon: Michael Grubbs MD;  Location: [facility] OR;  Service: ENT;  Laterality: Bilateral;    NASAL TURBINATE  REDUCTION Bilateral 2019    Procedure: REDUCTION, NASAL TURBINATE;  Surgeon: Michael Grubbs MD;  Location: [facility] OR;  Service: ENT;  Laterality: Bilateral;    SINUS SURGERY      x4    TUBAL LIGATION      WRIST SURGERY Left      Family History   Problem Relation Age of Onset    Breast cancer Neg Hx      Social History     Tobacco Use    Smoking status: Former Smoker     Packs/day: 0.75     Years: 25.00     Pack years: 18.75     Types: Cigarettes     Quit date: 1999     Years since quittin.2    Smokeless tobacco: Never Used   Substance Use Topics    Alcohol use: Yes     Frequency: Never     Comment: social    Drug use: No     Review of Systems   Constitutional: Negative for fever.   HENT: Negative for sore throat.    Respiratory: Negative for shortness of breath.    Cardiovascular: Negative for chest pain.   Gastrointestinal: Negative for nausea.   Genitourinary: Positive for genital sores and vaginal pain. Negative for dysuria.   Musculoskeletal: Negative for back pain.   Skin: Negative for rash.   Neurological: Negative for weakness.   Hematological: Does not bruise/bleed easily.       Physical Exam     Initial Vitals [20 0933]   BP Pulse Resp Temp SpO2   (!) 168/70 65 20 98.9 °F (37.2 °C) 99 %      MAP       --         Physical Exam    Nursing note and vitals reviewed.  Constitutional: Vital signs are normal. She appears well-developed and well-nourished. She is active and cooperative.   HENT:   Head: Normocephalic and atraumatic.   Eyes: Conjunctivae and lids are normal. Lids are everted and swept, no foreign bodies found.   Neck: Trachea normal, normal range of motion and full passive range of motion without pain. Neck supple.   Cardiovascular: Normal rate, regular rhythm, S1 normal, S2 normal, normal heart sounds, intact distal pulses and normal pulses.  No extrasystoles are present.    Abdominal: Soft. Normal appearance and bowel sounds are normal.   Genitourinary:     Genitourinary Comments: Tenderness in the left labia with out abscess.  No drainage.  Evidence of a infected hair follicle.  Has mild folliculitis.     Musculoskeletal: Normal range of motion.   Neurological: She is alert. She has normal reflexes. GCS eye subscore is 4. GCS verbal subscore is 5. GCS motor subscore is 6.   Skin: Skin is warm, dry and intact. Capillary refill takes less than 2 seconds.   Psychiatric: She has a normal mood and affect. Her speech is normal and behavior is normal. Cognition and memory are normal.         ED Course   Procedures  Labs Reviewed   POCT GLUCOSE - Abnormal; Notable for the following components:       Result Value    POCT Glucose 133 (*)     All other components within normal limits          Imaging Results    None          Medical Decision Making:   ED Management:  Patient with mild folliculitis and cellulitis of left labia.  No drainage required.  She with Bactrim and mupirocin                                 Clinical Impression:       ICD-10-CM ICD-9-CM   1. Folliculitis  L73.9 704.8         Disposition:   Disposition: Discharged  Condition: Stable     ED Disposition Condition    Discharge Stable        ED Prescriptions     Medication Sig Dispense Start Date End Date Auth. Provider    sulfamethoxazole-trimethoprim 800-160mg (BACTRIM DS) 800-160 mg Tab Take 1 tablet by mouth 2 (two) times daily. for 7 days 14 tablet 6/24/2020 7/1/2020 Goldy Toussaint MD    mupirocin calcium 2% (BACTROBAN) 2 % cream Apply topically 3 (three) times daily. 1 Tube 6/24/2020  Goldy Toussaint MD        Follow-up Information    None                                    Goldy Toussaint MD  06/24/20 1000

## 2020-09-17 ENCOUNTER — HOSPITAL ENCOUNTER (EMERGENCY)
Facility: HOSPITAL | Age: 70
Discharge: HOME | End: 2020-09-17
Attending: EMERGENCY MEDICINE
Payer: MEDICARE

## 2020-09-17 VITALS
RESPIRATION RATE: 20 BRPM | TEMPERATURE: 98 F | WEIGHT: 178 LBS | HEIGHT: 62 IN | HEART RATE: 73 BPM | SYSTOLIC BLOOD PRESSURE: 154 MMHG | OXYGEN SATURATION: 100 % | DIASTOLIC BLOOD PRESSURE: 81 MMHG | BODY MASS INDEX: 32.76 KG/M2

## 2020-09-17 DIAGNOSIS — S90.121A CONTUSION OF LESSER TOE OF RIGHT FOOT WITHOUT DAMAGE TO NAIL, INITIAL ENCOUNTER: Primary | ICD-10-CM

## 2020-09-17 DIAGNOSIS — M79.674 TOE PAIN, RIGHT: ICD-10-CM

## 2020-09-17 PROCEDURE — 99283 EMERGENCY DEPT VISIT LOW MDM: CPT | Mod: 25

## 2020-09-17 PROCEDURE — 73630 XR FOOT COMPLETE 3 VIEW RIGHT: ICD-10-PCS | Mod: 26,RT,, | Performed by: RADIOLOGY

## 2020-09-17 PROCEDURE — 73630 X-RAY EXAM OF FOOT: CPT | Mod: TC,FY,RT

## 2020-09-17 PROCEDURE — 73630 X-RAY EXAM OF FOOT: CPT | Mod: 26,RT,, | Performed by: RADIOLOGY

## 2020-09-17 NOTE — ED PROVIDER NOTES
Please note that my documentation in this Electronic Healthcare Record was produced using speech recognition software and therefore may contain errors related to that software.These could include grammar, punctuation and spelling errors or the inclusion/ exclusion of phrases that were not intended. Please contact myself for any clarification, questions or concerns.    HPI: Patient is a 70 y.o. female who presents with the chief complaint of right foot pain after a can of peas fell onto her foot earlier today.  Denies any numbness or tingling.  Pain primarily over the 3rd digit of the right foot.  Has not taken any medication.  Does not applied any ice to the area.  No other complaints or symptoms at this time.  No history of hypertension, diabetes, and arthritis.  She does take some medications regularly.    REVIEW OF SYSTEMS - 10 systems were independently reviewed and are otherwise negative with the exception of those items previously documented in the HPI and nursing notes.    Allergy: Ketorolac tromethamine, Moxifloxacin, and Prevacid [lansoprazole]    Past medical history:   Past Medical History:   Diagnosis Date    Anxiety     Arthritis     Chronic sinusitis     Diabetes mellitus     Hypertension        Surgical History:   Past Surgical History:   Procedure Laterality Date    BREAST BIOPSY      BREAST LUMPECTOMY Left     BUNIONECTOMY Bilateral     COLONOSCOPY      ESOPHAGOGASTRODUODENOSCOPY      ETHMOIDECTOMY Bilateral 2/7/2019    Procedure: ETHMOIDECTOMY;  Surgeon: Michael Grubbs MD;  Location: [facility] OR;  Service: ENT;  Laterality: Bilateral;  endoscopic anterior and posterior    FUNCTIONAL ENDOSCOPIC SINUS SURGERY (FESS) N/A 2/7/2019    Procedure: FESS (FUNCTIONAL ENDOSCOPIC SINUS SURGERY);  Surgeon: Michael Grubbs MD;  Location: [facility] OR;  Service: ENT;  Laterality: N/A;    FUNCTIONAL ENDOSCOPIC SINUS SURGERY (FESS) Bilateral 12/19/2019    Procedure: FESS (FUNCTIONAL ENDOSCOPIC SINUS  SURGERY);  Surgeon: Michael Grubbs MD;  Location: [facility] OR;  Service: ENT;  Laterality: Bilateral;    MAXILLARY ANTROSTOMY Bilateral 2019    Procedure: MAXILLARY ANTROSTOMY;  Surgeon: Michael Grubbs MD;  Location: [facility] OR;  Service: ENT;  Laterality: Bilateral;    NASAL SEPTOPLASTY Bilateral 2019    Procedure: SEPTOPLASTY, NOSE;  Surgeon: Michael Grubbs MD;  Location: [facility] OR;  Service: ENT;  Laterality: Bilateral;    NASAL TURBINATE REDUCTION Bilateral 2019    Procedure: REDUCTION, NASAL TURBINATE;  Surgeon: Michael Grubbs MD;  Location: [facility] OR;  Service: ENT;  Laterality: Bilateral;    SINUS SURGERY      x4    TUBAL LIGATION      WRIST SURGERY Left        Social history:   Social History     Socioeconomic History    Marital status:      Spouse name: Not on file    Number of children: Not on file    Years of education: Not on file    Highest education level: Not on file   Occupational History    Not on file   Social Needs    Financial resource strain: Not on file    Food insecurity     Worry: Not on file     Inability: Not on file    Transportation needs     Medical: Not on file     Non-medical: Not on file   Tobacco Use    Smoking status: Former Smoker     Packs/day: 0.75     Years: 25.00     Pack years: 18.75     Types: Cigarettes     Quit date: 1999     Years since quittin.4    Smokeless tobacco: Never Used   Substance and Sexual Activity    Alcohol use: Yes     Frequency: Never     Comment: social    Drug use: No    Sexual activity: Not Currently     Birth control/protection: See Surgical Hx   Lifestyle    Physical activity     Days per week: Not on file     Minutes per session: Not on file    Stress: Not on file   Relationships    Social connections     Talks on phone: Not on file     Gets together: Not on file     Attends [employer] service: Not on file     Active member of club or organization: Not on file     Attends meetings of clubs or  "organizations: Not on file     Relationship status: Not on file   Other Topics Concern    Not on file   Social History Narrative    Not on file       Family history: non-contributory    EHR: reviewed    Vitals: BP (!) 154/81 (BP Location: Left arm, Patient Position: Sitting)   Pulse 73   Temp 98.1 °F (36.7 °C) (Oral)   Resp 20   Ht 5' 2" (1.575 m)   Wt 80.7 kg (178 lb)   SpO2 100%   Breastfeeding No   BMI 32.56 kg/m²     PHYSICAL EXAM:    General-70-year-old female awake and alert, oriented, GCS 15, in no acute distress,  HEENT- normocephalic, atraumatic, sclera anicteric, moist mucous membranes, PERRL, EOMI  CARDIOVASCULAR- regular rate and rhythm  PULMONARY- nonlabored, no respiratory distress  NEUROLOGIC- mental status normal, speech fluid, cognition normal, CN II-XII grossly intact, sensations equal normal bilateral upper and lower extremities, peripheral pulse 2 +/4, ambulatory with proper gait.  MUSCULOSKELETAL- well-nourished, well-developed, able to flex and extend the phalangeal joints and ankle joint.  Tenderness over the 3rd digit of the right foot.  Small abrasion over the dorsum of the 3rd toe.  No active bleeding.  No tenderness over the ankle joint or the dorsum of the foot.  DERMATOLOGIC- warm and dry, no visible rashes  PSYCHIATRIC- normal affect, normal concentration           Labs Reviewed - No data to display    X-Ray Foot Complete Right    (Results Pending)       MEDICAL DECISION MAKING: Patient is a 70 y.o. female who presented with chief complaint of right toe pain after an injury.  No numbness or tingling.  On examination, she does have a small abrasion but no active bleeding.  No obvious laceration noted.  She does have the ability to move the digits of the foot.  There is some tenderness primarily over the 3rd digit but no other tenderness of the dorsum of the foot or ankle.  X-ray performed today is normal.  Patient advised to ice and elevate and to take over-the-counter " anti-inflammatories or Tylenol as needed for pain.  Vitals are stable normal.  Verbalizes her understanding and agrees to this plan.    CLINICAL IMPRESSION:  1. Contusion of lesser toe of right foot without damage to nail, initial encounter    2. Toe pain, right         VERONICA Lopez  09/17/20 [id number]

## 2020-09-17 NOTE — DISCHARGE INSTRUCTIONS
Take over-the-counter ibuprofen or Tylenol as needed for pain.  Apply ice to the area.  X-ray does not show any obvious broken bone or bone out of place.  Return for any worsening or new symptoms. Follow up with Primary Care Provider in the next 2-3 days.

## 2021-02-05 DIAGNOSIS — Z12.31 ENCOUNTER FOR SCREENING MAMMOGRAM FOR BREAST CANCER: Primary | ICD-10-CM

## 2021-02-08 ENCOUNTER — HOSPITAL ENCOUNTER (EMERGENCY)
Facility: HOSPITAL | Age: 71
Discharge: HOME | End: 2021-02-08
Attending: EMERGENCY MEDICINE
Payer: MEDICARE

## 2021-02-08 VITALS
SYSTOLIC BLOOD PRESSURE: 145 MMHG | RESPIRATION RATE: 18 BRPM | WEIGHT: 180 LBS | HEIGHT: 62 IN | DIASTOLIC BLOOD PRESSURE: 73 MMHG | BODY MASS INDEX: 33.13 KG/M2 | HEART RATE: 79 BPM | OXYGEN SATURATION: 97 %

## 2021-02-08 DIAGNOSIS — M65.331 TRIGGER MIDDLE FINGER OF RIGHT HAND: Primary | ICD-10-CM

## 2021-02-08 PROCEDURE — 73130 X-RAY EXAM OF HAND: CPT | Mod: 26,RT,, | Performed by: RADIOLOGY

## 2021-02-08 PROCEDURE — 99283 EMERGENCY DEPT VISIT LOW MDM: CPT | Mod: 25

## 2021-02-08 PROCEDURE — 73130 XR HAND COMPLETE 3 VIEW RIGHT: ICD-10-PCS | Mod: 26,RT,, | Performed by: RADIOLOGY

## 2021-02-08 PROCEDURE — 73130 X-RAY EXAM OF HAND: CPT | Mod: TC,FY,RT

## 2021-02-08 RX ORDER — GABAPENTIN 300 MG/1
300 CAPSULE ORAL 3 TIMES DAILY
COMMUNITY
End: 2021-10-29

## 2021-02-23 ENCOUNTER — LAB VISIT (OUTPATIENT)
Dept: LAB | Facility: HOSPITAL | Age: 71
End: 2021-02-23
Attending: FAMILY MEDICINE
Payer: MEDICARE

## 2021-02-23 DIAGNOSIS — R74.8 ELEVATED LIVER ENZYMES: ICD-10-CM

## 2021-02-23 DIAGNOSIS — D17.79 LIPOMA OF OTHER SPECIFIED SITES: Primary | ICD-10-CM

## 2021-02-23 PROCEDURE — 36415 COLL VENOUS BLD VENIPUNCTURE: CPT

## 2021-02-23 PROCEDURE — 80074 ACUTE HEPATITIS PANEL: CPT | Mod: GA

## 2021-02-24 LAB
HAV IGM SERPL QL IA: NEGATIVE
HBV CORE IGM SERPL QL IA: NEGATIVE
HBV SURFACE AG SERPL QL IA: NEGATIVE
HCV AB SERPL QL IA: NEGATIVE

## 2021-03-05 ENCOUNTER — OFFICE VISIT (OUTPATIENT)
Dept: ORTHOPEDICS | Facility: CLINIC | Age: 71
End: 2021-03-05
Payer: MEDICARE

## 2021-03-05 VITALS
TEMPERATURE: 98 F | HEIGHT: 62 IN | RESPIRATION RATE: 17 BRPM | WEIGHT: 180 LBS | BODY MASS INDEX: 33.13 KG/M2 | HEART RATE: 82 BPM | OXYGEN SATURATION: 97 %

## 2021-03-05 DIAGNOSIS — M18.11 ARTHRITIS OF CARPOMETACARPAL (CMC) JOINT OF RIGHT THUMB: ICD-10-CM

## 2021-03-05 DIAGNOSIS — M65.331 TRIGGER MIDDLE FINGER OF RIGHT HAND: Primary | ICD-10-CM

## 2021-03-05 PROCEDURE — 20550 NJX 1 TENDON SHEATH/LIGAMENT: CPT | Mod: PBBFAC,RT | Performed by: ORTHOPAEDIC SURGERY

## 2021-03-05 PROCEDURE — 99204 PR OFFICE/OUTPT VISIT, NEW, LEVL IV, 45-59 MIN: ICD-10-PCS | Mod: 25,S$PBB,, | Performed by: ORTHOPAEDIC SURGERY

## 2021-03-05 PROCEDURE — 99214 OFFICE O/P EST MOD 30 MIN: CPT | Mod: PBBFAC | Performed by: ORTHOPAEDIC SURGERY

## 2021-03-05 PROCEDURE — 20550 TENDON SHEATH: ICD-10-PCS | Mod: S$PBB,RT,, | Performed by: ORTHOPAEDIC SURGERY

## 2021-03-05 PROCEDURE — 99999 PR PBB SHADOW E&M-EST. PATIENT-LVL IV: ICD-10-PCS | Mod: PBBFAC,,, | Performed by: ORTHOPAEDIC SURGERY

## 2021-03-05 PROCEDURE — 99999 PR PBB SHADOW E&M-EST. PATIENT-LVL IV: CPT | Mod: PBBFAC,,, | Performed by: ORTHOPAEDIC SURGERY

## 2021-03-05 PROCEDURE — 99204 OFFICE O/P NEW MOD 45 MIN: CPT | Mod: 25,S$PBB,, | Performed by: ORTHOPAEDIC SURGERY

## 2021-03-05 RX ADMIN — TRIAMCINOLONE ACETONIDE 10 MG: 10 INJECTION, SUSPENSION INTRA-ARTICULAR; INTRALESIONAL at 01:03

## 2021-04-27 ENCOUNTER — HOSPITAL ENCOUNTER (OUTPATIENT)
Dept: RADIOLOGY | Facility: HOSPITAL | Age: 71
Discharge: HOME | End: 2021-04-27
Attending: FAMILY MEDICINE
Payer: MEDICARE

## 2021-04-27 VITALS — WEIGHT: 180 LBS | HEIGHT: 62 IN | BODY MASS INDEX: 33.13 KG/M2

## 2021-04-27 DIAGNOSIS — Z12.31 ENCOUNTER FOR SCREENING MAMMOGRAM FOR MALIGNANT NEOPLASM OF BREAST: ICD-10-CM

## 2021-04-27 PROCEDURE — 77067 SCR MAMMO BI INCL CAD: CPT | Mod: 26,,, | Performed by: RADIOLOGY

## 2021-04-27 PROCEDURE — 77067 MAMMO DIGITAL SCREENING BILAT WITH TOMO: ICD-10-PCS | Mod: 26,,, | Performed by: RADIOLOGY

## 2021-04-27 PROCEDURE — 77067 SCR MAMMO BI INCL CAD: CPT | Mod: TC

## 2021-04-27 PROCEDURE — 77063 BREAST TOMOSYNTHESIS BI: CPT | Mod: 26,,, | Performed by: RADIOLOGY

## 2021-04-27 PROCEDURE — 77063 MAMMO DIGITAL SCREENING BILAT WITH TOMO: ICD-10-PCS | Mod: 26,,, | Performed by: RADIOLOGY

## 2021-07-28 ENCOUNTER — HOSPITAL ENCOUNTER (EMERGENCY)
Facility: HOSPITAL | Age: 71
Discharge: HOME | End: 2021-07-28
Payer: MEDICARE

## 2021-07-28 VITALS
BODY MASS INDEX: 30.46 KG/M2 | TEMPERATURE: 99 F | WEIGHT: 171.94 LBS | OXYGEN SATURATION: 98 % | SYSTOLIC BLOOD PRESSURE: 151 MMHG | HEIGHT: 63 IN | DIASTOLIC BLOOD PRESSURE: 82 MMHG | RESPIRATION RATE: 18 BRPM | HEART RATE: 73 BPM

## 2021-07-28 DIAGNOSIS — J06.9 URI WITH COUGH AND CONGESTION: Primary | ICD-10-CM

## 2021-07-28 PROCEDURE — 99283 EMERGENCY DEPT VISIT LOW MDM: CPT

## 2021-07-28 RX ORDER — AMOXICILLIN 500 MG/1
500 CAPSULE ORAL 3 TIMES DAILY
Qty: 21 CAPSULE | Refills: 0 | Status: SHIPPED | OUTPATIENT
Start: 2021-07-28 | End: 2021-08-04

## 2021-08-11 ENCOUNTER — LAB VISIT (OUTPATIENT)
Dept: FAMILY MEDICINE | Facility: CLINIC | Age: 71
End: 2021-08-11
Payer: MEDICARE

## 2021-08-11 DIAGNOSIS — M79.10 MUSCLE PAIN: ICD-10-CM

## 2021-08-11 DIAGNOSIS — R05.9 COUGH: ICD-10-CM

## 2021-08-11 PROCEDURE — U0003 INFECTIOUS AGENT DETECTION BY NUCLEIC ACID (DNA OR RNA); SEVERE ACUTE RESPIRATORY SYNDROME CORONAVIRUS 2 (SARS-COV-2) (CORONAVIRUS DISEASE [COVID-19]), AMPLIFIED PROBE TECHNIQUE, MAKING USE OF HIGH THROUGHPUT TECHNOLOGIES AS DESCRIBED BY CMS-2020-01-R: HCPCS | Performed by: FAMILY MEDICINE

## 2021-08-11 PROCEDURE — U0005 INFEC AGEN DETEC AMPLI PROBE: HCPCS | Performed by: FAMILY MEDICINE

## 2021-08-12 LAB
SARS-COV-2 RNA RESP QL NAA+PROBE: NOT DETECTED
SARS-COV-2- CYCLE NUMBER: -1

## 2021-08-18 ENCOUNTER — DOCUMENTATION ONLY (OUTPATIENT)
Dept: FAMILY MEDICINE | Facility: CLINIC | Age: 71
End: 2021-08-18

## 2021-09-18 ENCOUNTER — HOSPITAL ENCOUNTER (EMERGENCY)
Facility: HOSPITAL | Age: 71
Discharge: HOME | End: 2021-09-18
Attending: FAMILY MEDICINE
Payer: MEDICARE

## 2021-09-18 VITALS
DIASTOLIC BLOOD PRESSURE: 83 MMHG | BODY MASS INDEX: 30.12 KG/M2 | HEART RATE: 72 BPM | WEIGHT: 170 LBS | OXYGEN SATURATION: 97 % | HEIGHT: 63 IN | RESPIRATION RATE: 18 BRPM | TEMPERATURE: 99 F | SYSTOLIC BLOOD PRESSURE: 165 MMHG

## 2021-09-18 DIAGNOSIS — N76.4 LEFT GENITAL LABIAL ABSCESS: Primary | ICD-10-CM

## 2021-09-18 PROCEDURE — 82962 GLUCOSE BLOOD TEST: CPT

## 2021-09-18 PROCEDURE — 99283 EMERGENCY DEPT VISIT LOW MDM: CPT | Mod: 25

## 2021-09-18 RX ORDER — BUPROPION HYDROCHLORIDE 150 MG/1
150 TABLET, EXTENDED RELEASE ORAL 2 TIMES DAILY
COMMUNITY
Start: 2021-08-27 | End: 2021-10-29

## 2021-09-18 RX ORDER — SULFAMETHOXAZOLE AND TRIMETHOPRIM 800; 160 MG/1; MG/1
1 TABLET ORAL 2 TIMES DAILY
Qty: 14 TABLET | Refills: 0 | Status: SHIPPED | OUTPATIENT
Start: 2021-09-18 | End: 2021-09-25

## 2021-09-19 LAB — POCT GLUCOSE: 149 MG/DL (ref 70–110)

## 2021-10-22 ENCOUNTER — HOSPITAL ENCOUNTER (EMERGENCY)
Facility: HOSPITAL | Age: 71
Discharge: HOME | End: 2021-10-22
Attending: FAMILY MEDICINE
Payer: MEDICARE

## 2021-10-22 VITALS
RESPIRATION RATE: 16 BRPM | TEMPERATURE: 99 F | HEIGHT: 62 IN | SYSTOLIC BLOOD PRESSURE: 149 MMHG | HEART RATE: 67 BPM | DIASTOLIC BLOOD PRESSURE: 72 MMHG | BODY MASS INDEX: 33.13 KG/M2 | WEIGHT: 180 LBS | OXYGEN SATURATION: 98 %

## 2021-10-22 DIAGNOSIS — M65.331 TRIGGER MIDDLE FINGER OF RIGHT HAND: Primary | ICD-10-CM

## 2021-10-22 PROCEDURE — 99283 EMERGENCY DEPT VISIT LOW MDM: CPT

## 2021-10-22 RX ORDER — EMPAGLIFLOZIN 10 MG/1
10 TABLET, FILM COATED ORAL DAILY
COMMUNITY
Start: 2021-09-16 | End: 2021-10-29

## 2021-10-22 RX ORDER — KETOROLAC TROMETHAMINE 5 MG/ML
SOLUTION OPHTHALMIC
COMMUNITY
Start: 2021-08-12 | End: 2023-06-05

## 2021-10-22 RX ORDER — NAPROXEN 500 MG/1
500 TABLET ORAL 2 TIMES DAILY WITH MEALS
Qty: 30 TABLET | Refills: 0 | Status: SHIPPED | OUTPATIENT
Start: 2021-10-22 | End: 2021-10-29

## 2021-10-22 RX ORDER — PREGABALIN 75 MG/1
75 CAPSULE ORAL 2 TIMES DAILY
COMMUNITY
Start: 2021-10-08 | End: 2023-06-05 | Stop reason: SDUPTHER

## 2021-10-26 ENCOUNTER — TELEPHONE (OUTPATIENT)
Dept: ORTHOPEDICS | Facility: CLINIC | Age: 71
End: 2021-10-26
Payer: MEDICARE

## 2021-10-28 DIAGNOSIS — M65.331 TRIGGER MIDDLE FINGER OF RIGHT HAND: Primary | ICD-10-CM

## 2021-10-29 ENCOUNTER — OFFICE VISIT (OUTPATIENT)
Dept: ORTHOPEDICS | Facility: CLINIC | Age: 71
End: 2021-10-29
Payer: MEDICARE

## 2021-10-29 ENCOUNTER — HOSPITAL ENCOUNTER (OUTPATIENT)
Dept: RADIOLOGY | Facility: HOSPITAL | Age: 71
Discharge: HOME | End: 2021-10-29
Attending: ORTHOPAEDIC SURGERY
Payer: MEDICARE

## 2021-10-29 VITALS — WEIGHT: 179.88 LBS | HEART RATE: 68 BPM | HEIGHT: 62 IN | OXYGEN SATURATION: 99 % | BODY MASS INDEX: 33.1 KG/M2

## 2021-10-29 DIAGNOSIS — M65.331 TRIGGER MIDDLE FINGER OF RIGHT HAND: ICD-10-CM

## 2021-10-29 DIAGNOSIS — M18.11 ARTHRITIS OF CARPOMETACARPAL (CMC) JOINT OF RIGHT THUMB: ICD-10-CM

## 2021-10-29 DIAGNOSIS — M65.321 TRIGGER INDEX FINGER OF RIGHT HAND: Primary | ICD-10-CM

## 2021-10-29 DIAGNOSIS — M79.641 RIGHT HAND PAIN: ICD-10-CM

## 2021-10-29 PROCEDURE — 73130 XR HAND COMPLETE 3 VIEW RIGHT: ICD-10-PCS | Mod: 26,RT,, | Performed by: RADIOLOGY

## 2021-10-29 PROCEDURE — 99214 PR OFFICE/OUTPT VISIT, EST, LEVL IV, 30-39 MIN: ICD-10-PCS | Mod: 25,S$PBB,, | Performed by: ORTHOPAEDIC SURGERY

## 2021-10-29 PROCEDURE — 99214 OFFICE O/P EST MOD 30 MIN: CPT | Mod: PBBFAC,25 | Performed by: ORTHOPAEDIC SURGERY

## 2021-10-29 PROCEDURE — 73130 X-RAY EXAM OF HAND: CPT | Mod: 26,RT,, | Performed by: RADIOLOGY

## 2021-10-29 PROCEDURE — 73130 X-RAY EXAM OF HAND: CPT | Mod: TC,FY,RT

## 2021-10-29 PROCEDURE — 99999 PR PBB SHADOW E&M-EST. PATIENT-LVL IV: ICD-10-PCS | Mod: PBBFAC,,, | Performed by: ORTHOPAEDIC SURGERY

## 2021-10-29 PROCEDURE — 20600 SMALL JOINT ASPIRATION/INJECTION: R THUMB CMC: ICD-10-PCS | Mod: 51,S$PBB,RT, | Performed by: ORTHOPAEDIC SURGERY

## 2021-10-29 PROCEDURE — 99999 PR PBB SHADOW E&M-EST. PATIENT-LVL IV: CPT | Mod: PBBFAC,,, | Performed by: ORTHOPAEDIC SURGERY

## 2021-10-29 PROCEDURE — 20600 DRAIN/INJ JOINT/BURSA W/O US: CPT | Mod: PBBFAC,51,RT | Performed by: ORTHOPAEDIC SURGERY

## 2021-10-29 PROCEDURE — 20550 NJX 1 TENDON SHEATH/LIGAMENT: CPT | Mod: PBBFAC,59,RT | Performed by: ORTHOPAEDIC SURGERY

## 2021-10-29 PROCEDURE — 20600 DRAIN/INJ JOINT/BURSA W/O US: CPT | Mod: PBBFAC,RT | Performed by: ORTHOPAEDIC SURGERY

## 2021-10-29 PROCEDURE — 99214 OFFICE O/P EST MOD 30 MIN: CPT | Mod: 25,S$PBB,, | Performed by: ORTHOPAEDIC SURGERY

## 2021-10-29 PROCEDURE — 20550 TENDON SHEATH: ICD-10-PCS | Mod: 59,S$PBB,RT, | Performed by: ORTHOPAEDIC SURGERY

## 2021-10-29 RX ORDER — FAMOTIDINE 20 MG/1
TABLET, FILM COATED ORAL
COMMUNITY
Start: 2021-10-22 | End: 2023-06-05

## 2021-10-29 RX ORDER — ALBUTEROL SULFATE 90 UG/1
2 AEROSOL, METERED RESPIRATORY (INHALATION) EVERY 4 HOURS PRN
COMMUNITY
Start: 2021-09-07 | End: 2023-06-05

## 2021-10-29 RX ORDER — METFORMIN HYDROCHLORIDE 1000 MG/1
1000 TABLET ORAL 2 TIMES DAILY
COMMUNITY
Start: 2021-09-07

## 2021-10-29 RX ORDER — FENOFIBRATE 160 MG/1
160 TABLET ORAL DAILY
COMMUNITY
Start: 2021-10-05 | End: 2023-06-05

## 2021-10-29 RX ORDER — ASPIRIN 81 MG/1
81 TABLET ORAL NIGHTLY
COMMUNITY
Start: 2021-09-07

## 2021-10-29 RX ORDER — LORATADINE 10 MG/1
10 TABLET ORAL
COMMUNITY
Start: 2021-09-07 | End: 2023-06-05

## 2021-10-29 RX ORDER — METOPROLOL TARTRATE 50 MG/1
50 TABLET ORAL 2 TIMES DAILY
COMMUNITY
Start: 2021-09-07 | End: 2023-06-05 | Stop reason: SDUPTHER

## 2021-10-29 RX ORDER — SIMVASTATIN 20 MG/1
20 TABLET, FILM COATED ORAL NIGHTLY
COMMUNITY
Start: 2021-09-24 | End: 2023-06-05 | Stop reason: SDUPTHER

## 2021-10-29 RX ORDER — EPINEPHRINE 0.3 MG/.3ML
0.3 INJECTION SUBCUTANEOUS
COMMUNITY
Start: 2020-12-28 | End: 2023-06-05

## 2021-10-29 RX ORDER — ESCITALOPRAM OXALATE 5 MG/1
2.5 TABLET ORAL 2 TIMES DAILY
COMMUNITY
Start: 2021-07-08 | End: 2023-06-05 | Stop reason: SDUPTHER

## 2021-10-29 RX ORDER — DOXYCYCLINE 100 MG/1
100 CAPSULE ORAL 2 TIMES DAILY
COMMUNITY
Start: 2021-08-12 | End: 2022-02-13

## 2021-10-29 RX ORDER — ERGOCALCIFEROL 1.25 MG/1
50000 CAPSULE ORAL
COMMUNITY
Start: 2021-04-21 | End: 2023-06-05

## 2021-10-29 RX ADMIN — TRIAMCINOLONE ACETONIDE 10 MG: 10 INJECTION, SUSPENSION INTRA-ARTICULAR; INTRALESIONAL at 03:10

## 2021-11-02 ENCOUNTER — TELEPHONE (OUTPATIENT)
Dept: ORTHOPEDICS | Facility: CLINIC | Age: 71
End: 2021-11-02
Payer: MEDICARE

## 2021-11-29 ENCOUNTER — HOSPITAL ENCOUNTER (EMERGENCY)
Facility: HOSPITAL | Age: 71
Discharge: HOME | End: 2021-11-29
Attending: EMERGENCY MEDICINE
Payer: MEDICARE

## 2021-11-29 VITALS
RESPIRATION RATE: 18 BRPM | DIASTOLIC BLOOD PRESSURE: 79 MMHG | SYSTOLIC BLOOD PRESSURE: 168 MMHG | BODY MASS INDEX: 33.13 KG/M2 | HEART RATE: 70 BPM | WEIGHT: 180 LBS | TEMPERATURE: 99 F | HEIGHT: 62 IN | OXYGEN SATURATION: 98 %

## 2021-11-29 DIAGNOSIS — S05.02XA CONJUNCTIVAL ABRASION, LEFT, INITIAL ENCOUNTER: ICD-10-CM

## 2021-11-29 DIAGNOSIS — H57.12 PAIN OF LEFT EYE: Primary | ICD-10-CM

## 2021-11-29 LAB — POCT GLUCOSE: 106 MG/DL (ref 70–110)

## 2021-11-29 PROCEDURE — 25000003 PHARM REV CODE 250: Performed by: EMERGENCY MEDICINE

## 2021-11-29 PROCEDURE — 99282 EMERGENCY DEPT VISIT SF MDM: CPT

## 2021-11-29 PROCEDURE — 82962 GLUCOSE BLOOD TEST: CPT

## 2021-11-29 RX ADMIN — TOBRAMYCIN AND DEXAMETHASONE 1 APPLICATION: 3; 1 OINTMENT OPHTHALMIC at 08:11

## 2022-01-26 ENCOUNTER — LAB VISIT (OUTPATIENT)
Dept: FAMILY MEDICINE | Facility: CLINIC | Age: 72
End: 2022-01-26
Payer: MEDICARE

## 2022-01-26 DIAGNOSIS — R09.81 CONGESTED NOSE: Primary | ICD-10-CM

## 2022-01-26 DIAGNOSIS — Z20.822 CONTACT WITH AND (SUSPECTED) EXPOSURE TO COVID-19: ICD-10-CM

## 2022-01-26 LAB
SARS-COV-2 RNA RESP QL NAA+PROBE: NOT DETECTED
SARS-COV-2- CYCLE NUMBER: NORMAL

## 2022-01-26 PROCEDURE — U0005 INFEC AGEN DETEC AMPLI PROBE: HCPCS | Performed by: FAMILY MEDICINE

## 2022-01-26 PROCEDURE — U0003 INFECTIOUS AGENT DETECTION BY NUCLEIC ACID (DNA OR RNA); SEVERE ACUTE RESPIRATORY SYNDROME CORONAVIRUS 2 (SARS-COV-2) (CORONAVIRUS DISEASE [COVID-19]), AMPLIFIED PROBE TECHNIQUE, MAKING USE OF HIGH THROUGHPUT TECHNOLOGIES AS DESCRIBED BY CMS-2020-01-R: HCPCS | Performed by: FAMILY MEDICINE

## 2022-01-26 NOTE — PROGRESS NOTES
Argelia Pyle presented to clinic for COVID-19 swab.   Argelia Pyle verified x2, name and .   Argelia Pyle instructed on what will be completed, asked if ever had COVID-19 swab.   Explained procedure to Argelia Pyle.   Specimen obtained.   No questions or concerns voiced further at this time.   Argelia Pyle left in satisfactory condition.

## 2022-02-10 ENCOUNTER — LAB VISIT (OUTPATIENT)
Dept: FAMILY MEDICINE | Facility: CLINIC | Age: 72
End: 2022-02-10
Payer: MEDICARE

## 2022-02-10 DIAGNOSIS — R09.81 SINUS CONGESTION: ICD-10-CM

## 2022-02-10 DIAGNOSIS — R50.9 FEVER, UNSPECIFIED FEVER CAUSE: Primary | ICD-10-CM

## 2022-02-10 PROCEDURE — U0003 INFECTIOUS AGENT DETECTION BY NUCLEIC ACID (DNA OR RNA); SEVERE ACUTE RESPIRATORY SYNDROME CORONAVIRUS 2 (SARS-COV-2) (CORONAVIRUS DISEASE [COVID-19]), AMPLIFIED PROBE TECHNIQUE, MAKING USE OF HIGH THROUGHPUT TECHNOLOGIES AS DESCRIBED BY CMS-2020-01-R: HCPCS | Performed by: FAMILY MEDICINE

## 2022-02-10 PROCEDURE — U0005 INFEC AGEN DETEC AMPLI PROBE: HCPCS | Performed by: FAMILY MEDICINE

## 2022-02-11 LAB
SARS-COV-2 RNA RESP QL NAA+PROBE: NOT DETECTED
SARS-COV-2- CYCLE NUMBER: NORMAL

## 2022-02-13 ENCOUNTER — HOSPITAL ENCOUNTER (EMERGENCY)
Facility: HOSPITAL | Age: 72
Discharge: HOME | End: 2022-02-13
Payer: MEDICARE

## 2022-02-13 VITALS
HEIGHT: 62 IN | RESPIRATION RATE: 18 BRPM | OXYGEN SATURATION: 100 % | WEIGHT: 180 LBS | BODY MASS INDEX: 33.13 KG/M2 | HEART RATE: 66 BPM | TEMPERATURE: 99 F | DIASTOLIC BLOOD PRESSURE: 71 MMHG | SYSTOLIC BLOOD PRESSURE: 156 MMHG

## 2022-02-13 DIAGNOSIS — H92.09 OTALGIA, UNSPECIFIED LATERALITY: ICD-10-CM

## 2022-02-13 DIAGNOSIS — J01.41 ACUTE RECURRENT PANSINUSITIS: Primary | ICD-10-CM

## 2022-02-13 DIAGNOSIS — R09.81 NASAL CONGESTION: ICD-10-CM

## 2022-02-13 PROCEDURE — U0003 INFECTIOUS AGENT DETECTION BY NUCLEIC ACID (DNA OR RNA); SEVERE ACUTE RESPIRATORY SYNDROME CORONAVIRUS 2 (SARS-COV-2) (CORONAVIRUS DISEASE [COVID-19]), AMPLIFIED PROBE TECHNIQUE, MAKING USE OF HIGH THROUGHPUT TECHNOLOGIES AS DESCRIBED BY CMS-2020-01-R: HCPCS | Performed by: NURSE PRACTITIONER

## 2022-02-13 PROCEDURE — 96372 THER/PROPH/DIAG INJ SC/IM: CPT

## 2022-02-13 PROCEDURE — U0005 INFEC AGEN DETEC AMPLI PROBE: HCPCS | Performed by: NURSE PRACTITIONER

## 2022-02-13 PROCEDURE — 63600175 PHARM REV CODE 636 W HCPCS: Performed by: NURSE PRACTITIONER

## 2022-02-13 PROCEDURE — 99284 EMERGENCY DEPT VISIT MOD MDM: CPT | Mod: 25

## 2022-02-13 RX ORDER — AMOXICILLIN AND CLAVULANATE POTASSIUM 875; 125 MG/1; MG/1
1 TABLET, FILM COATED ORAL 2 TIMES DAILY
Qty: 14 TABLET | Refills: 0 | Status: SHIPPED | OUTPATIENT
Start: 2022-02-13 | End: 2023-06-05

## 2022-02-13 RX ORDER — METHYLPREDNISOLONE 4 MG/1
TABLET ORAL
Qty: 1 EACH | Refills: 0 | Status: SHIPPED | OUTPATIENT
Start: 2022-02-13 | End: 2022-03-06

## 2022-02-13 RX ORDER — DEXAMETHASONE SODIUM PHOSPHATE 10 MG/ML
10 INJECTION INTRAMUSCULAR; INTRAVENOUS
Status: COMPLETED | OUTPATIENT
Start: 2022-02-13 | End: 2022-02-13

## 2022-02-13 RX ADMIN — DEXAMETHASONE SODIUM PHOSPHATE 10 MG: 10 INJECTION INTRAMUSCULAR; INTRAVENOUS at 02:02

## 2022-02-13 NOTE — DISCHARGE INSTRUCTIONS
Take the medications as prescribed. Monitor your blood sugar. Return for any worsening or new symptoms. Follow up with Primary Care Provider/urology in the next 2-3 days.

## 2022-02-14 LAB
SARS-COV-2 RNA RESP QL NAA+PROBE: NOT DETECTED
SARS-COV-2- CYCLE NUMBER: NORMAL

## 2022-02-14 NOTE — ED PROVIDER NOTES
Encounter Date: 2/13/2022       History     Chief Complaint   Patient presents with    Nasal Congestion    Otalgia    Chills     The history is provided by the patient.   Sinusitis   This is a chronic problem. The current episode started several weeks ago. The problem has been gradually worsening. The pain is at a severity of 5/10. The pain has been constant since onset. Associated symptoms include congestion and sinus pressure. Pertinent negatives include no chills, no sweats, no ear pain, no hoarse voice, no swollen glands, no cough and no shortness of breath. She has tried other medications for the symptoms. The treatment provided no relief.     Review of patient's allergies indicates:   Allergen Reactions    Ketorolac tromethamine Other (See Comments)     Other reaction(s): Burning    Moxifloxacin Other (See Comments)     Heart racing.    Prevacid [lansoprazole] Itching     Past Medical History:   Diagnosis Date    Anxiety     Arthritis     Chronic sinusitis     Diabetes mellitus     Hypertension      Past Surgical History:   Procedure Laterality Date    BREAST BIOPSY      BREAST LUMPECTOMY Left     BUNIONECTOMY Bilateral     COLONOSCOPY      ESOPHAGOGASTRODUODENOSCOPY      ETHMOIDECTOMY Bilateral 2/7/2019    Procedure: ETHMOIDECTOMY;  Surgeon: Michael Grubbs MD;  Location: [facility] OR;  Service: ENT;  Laterality: Bilateral;  endoscopic anterior and posterior    FUNCTIONAL ENDOSCOPIC SINUS SURGERY (FESS) N/A 2/7/2019    Procedure: FESS (FUNCTIONAL ENDOSCOPIC SINUS SURGERY);  Surgeon: Michael Grubbs MD;  Location: [facility] OR;  Service: ENT;  Laterality: N/A;    FUNCTIONAL ENDOSCOPIC SINUS SURGERY (FESS) Bilateral 12/19/2019    Procedure: FESS (FUNCTIONAL ENDOSCOPIC SINUS SURGERY);  Surgeon: Michael Grubbs MD;  Location: [facility] OR;  Service: ENT;  Laterality: Bilateral;    MAXILLARY ANTROSTOMY Bilateral 2/7/2019    Procedure: MAXILLARY ANTROSTOMY;  Surgeon: Michael Grubbs MD;  Location:  [facility] OR;  Service: ENT;  Laterality: Bilateral;    NASAL SEPTOPLASTY Bilateral 2019    Procedure: SEPTOPLASTY, NOSE;  Surgeon: Michael Grubbs MD;  Location: [facility] OR;  Service: ENT;  Laterality: Bilateral;    NASAL TURBINATE REDUCTION Bilateral 2019    Procedure: REDUCTION, NASAL TURBINATE;  Surgeon: Michael Grubbs MD;  Location: [facility] OR;  Service: ENT;  Laterality: Bilateral;    SINUS SURGERY      x4    TUBAL LIGATION      WRIST SURGERY Left      Family History   Problem Relation Age of Onset    Breast cancer Neg Hx      Social History     Tobacco Use    Smoking status: Former Smoker     Packs/day: 0.75     Years: 25.00     Pack years: 18.75     Types: Cigarettes     Quit date: 1999     Years since quittin.8    Smokeless tobacco: Never Used   Substance Use Topics    Alcohol use: Yes     Comment: social    Drug use: No     Review of Systems   Constitutional: Negative for chills.   HENT: Positive for congestion and sinus pressure. Negative for ear pain and hoarse voice.    Respiratory: Negative for cough and shortness of breath.    All other systems reviewed and are negative.      Physical Exam     Initial Vitals [22 1221]   BP Pulse Resp Temp SpO2   (!) 156/71 66 18 98.5 °F (36.9 °C) 100 %      MAP       --         Physical Exam    Nursing note and vitals reviewed.  Constitutional: She appears well-developed and well-nourished. No distress.   HENT:   Head: Normocephalic and atraumatic.   Nose: Mucosal edema and sinus tenderness present. Right sinus exhibits maxillary sinus tenderness and frontal sinus tenderness. Left sinus exhibits maxillary sinus tenderness and frontal sinus tenderness.   Mouth/Throat: Uvula is midline. Posterior oropharyngeal edema and posterior oropharyngeal erythema present.   Eyes: EOM are normal. Pupils are equal, round, and reactive to light.   Neck:   Normal range of motion.  Cardiovascular: Normal rate and regular rhythm.   No murmur  heard.  Pulmonary/Chest: Breath sounds normal. No respiratory distress. She has no wheezes. She has no rhonchi. She has no rales. She exhibits no tenderness.   Abdominal: Abdomen is soft.   Musculoskeletal:         General: Normal range of motion.      Cervical back: Normal range of motion.     Neurological: She is alert and oriented to person, place, and time. She has normal strength. GCS score is 15. GCS eye subscore is 4. GCS verbal subscore is 5. GCS motor subscore is 6.   Skin: Skin is warm and dry.   Psychiatric: She has a normal mood and affect.         ED Course   Procedures  Labs Reviewed   SARS-COV-2 (COVID-19) QUALITATIVE PCR          Imaging Results    None          Medications   dexAMETHasone sodium phos (PF) injection 10 mg (10 mg Intramuscular Given 2/13/22 1406)     Medical Decision Making:   Differential Diagnosis:   Sinusitis , Hay fever, seasonal influenza, Covid 19 virus, URI  ED Management:  Management:  - Patient with stable vitals on presentation  - No emergent findings on history or physical exam as above  - oral antibiotics given    Please return for new, changing, or worsening sinus pain. The patient(parent) was also instructed that follow up with a primary care physician is strongly recommended after any visit to the ED.  Patient expressed understanding and agreed with treatment plan and was discharged in stable condition.                         Clinical Impression:   Final diagnoses:  [J01.41] Acute recurrent pansinusitis (Primary)  [R09.81] Nasal congestion  [H92.09] Otalgia, unspecified laterality          ED Disposition Condition    Discharge Stable        ED Prescriptions     Medication Sig Dispense Start Date End Date Auth. Provider    amoxicillin-clavulanate 875-125mg (AUGMENTIN) 875-125 mg per tablet Take 1 tablet by mouth 2 (two) times daily. 14 tablet 2/13/2022  Paul Rios NP    methylPREDNISolone (MEDROL DOSEPACK) 4 mg tablet Take as directed with food 1 each 2/13/2022  3/6/2022 Paul Rios NP        Follow-up Information    None          Paul Rios NP  02/13/22 1938

## 2022-03-03 ENCOUNTER — HOSPITAL ENCOUNTER (OUTPATIENT)
Dept: RADIOLOGY | Facility: HOSPITAL | Age: 72
Discharge: HOME | End: 2022-03-03
Attending: OTOLARYNGOLOGY
Payer: MEDICARE

## 2022-03-03 DIAGNOSIS — J32.4 CHRONIC PANSINUSITIS: ICD-10-CM

## 2022-03-03 PROCEDURE — 70486 CT MAXILLOFACIAL W/O DYE: CPT | Mod: 26,,, | Performed by: RADIOLOGY

## 2022-03-03 PROCEDURE — 70486 CT MAXILLOFACIAL W/O DYE: CPT | Mod: TC

## 2022-03-03 PROCEDURE — 70486 CT SINUSES WITHOUT CONTRAST: ICD-10-PCS | Mod: 26,,, | Performed by: RADIOLOGY

## 2022-03-09 ENCOUNTER — APPOINTMENT (OUTPATIENT)
Dept: LAB | Facility: HOSPITAL | Age: 72
End: 2022-03-09
Attending: NURSE PRACTITIONER
Payer: MEDICARE

## 2022-03-09 DIAGNOSIS — J32.9 CHRONIC SINUSITIS: Primary | ICD-10-CM

## 2022-03-09 PROCEDURE — 87107 FUNGI IDENTIFICATION MOLD: CPT | Mod: 59 | Performed by: NURSE PRACTITIONER

## 2022-03-09 PROCEDURE — 87102 FUNGUS ISOLATION CULTURE: CPT | Performed by: NURSE PRACTITIONER

## 2022-03-09 PROCEDURE — 87076 CULTURE ANAEROBE IDENT EACH: CPT | Performed by: OTOLARYNGOLOGY

## 2022-03-09 PROCEDURE — 87075 CULTR BACTERIA EXCEPT BLOOD: CPT | Performed by: NURSE PRACTITIONER

## 2022-03-09 PROCEDURE — 87070 CULTURE OTHR SPECIMN AEROBIC: CPT | Performed by: NURSE PRACTITIONER

## 2022-03-11 LAB — BACTERIA SPEC AEROBE CULT: NORMAL

## 2022-03-16 LAB — BACTERIA SPEC ANAEROBE CULT: ABNORMAL

## 2022-04-01 RX ORDER — NITROFURANTOIN 25; 75 MG/1; MG/1
100 CAPSULE ORAL 2 TIMES DAILY
Qty: 10 CAPSULE | Refills: 0 | Status: SHIPPED | OUTPATIENT
Start: 2022-04-01 | End: 2023-06-05

## 2022-04-01 NOTE — PROGRESS NOTES
Notified by Sonya Godfrey RN that patient had E coli on urine culture from 3/30/22. I could not locate this in Epic. I messaged RN to clarify this is the correct patient, no response yet. I do not want patient to go through the weekend with UTI, so I am sending in Macrobid to pharmacy. My MA Alivia messaged patient regarding above.

## 2022-05-02 LAB — FUNGUS SPEC CULT: ABNORMAL

## 2022-05-31 ENCOUNTER — HOSPITAL ENCOUNTER (OUTPATIENT)
Dept: RADIOLOGY | Facility: HOSPITAL | Age: 72
Discharge: HOME | End: 2022-05-31
Attending: FAMILY MEDICINE
Payer: MEDICARE

## 2022-05-31 VITALS — WEIGHT: 179.88 LBS | HEIGHT: 62 IN | BODY MASS INDEX: 33.1 KG/M2

## 2022-05-31 DIAGNOSIS — Z12.31 ENCOUNTER FOR SCREENING MAMMOGRAM FOR BREAST CANCER: ICD-10-CM

## 2022-05-31 PROCEDURE — 77063 BREAST TOMOSYNTHESIS BI: CPT | Mod: TC

## 2022-05-31 PROCEDURE — 77067 SCR MAMMO BI INCL CAD: CPT | Mod: 26,,, | Performed by: RADIOLOGY

## 2022-05-31 PROCEDURE — 77063 MAMMO DIGITAL SCREENING BILAT WITH TOMO: ICD-10-PCS | Mod: 26,,, | Performed by: RADIOLOGY

## 2022-05-31 PROCEDURE — 77067 MAMMO DIGITAL SCREENING BILAT WITH TOMO: ICD-10-PCS | Mod: 26,,, | Performed by: RADIOLOGY

## 2022-05-31 PROCEDURE — 77063 BREAST TOMOSYNTHESIS BI: CPT | Mod: 26,,, | Performed by: RADIOLOGY

## 2022-06-02 ENCOUNTER — HOSPITAL ENCOUNTER (EMERGENCY)
Facility: HOSPITAL | Age: 72
Discharge: HOME | End: 2022-06-02
Attending: INTERNAL MEDICINE
Payer: MEDICARE

## 2022-06-02 VITALS
HEART RATE: 74 BPM | RESPIRATION RATE: 20 BRPM | TEMPERATURE: 98 F | WEIGHT: 179 LBS | SYSTOLIC BLOOD PRESSURE: 175 MMHG | BODY MASS INDEX: 32.94 KG/M2 | HEIGHT: 62 IN | OXYGEN SATURATION: 99 % | DIASTOLIC BLOOD PRESSURE: 86 MMHG

## 2022-06-02 DIAGNOSIS — S00.83XA CONTUSION OF FOREHEAD, INITIAL ENCOUNTER: ICD-10-CM

## 2022-06-02 DIAGNOSIS — S00.03XA CONTUSION OF SCALP, INITIAL ENCOUNTER: Primary | ICD-10-CM

## 2022-06-02 PROCEDURE — 70450 CT HEAD/BRAIN W/O DYE: CPT | Mod: 26,,, | Performed by: RADIOLOGY

## 2022-06-02 PROCEDURE — 99284 EMERGENCY DEPT VISIT MOD MDM: CPT | Mod: 25

## 2022-06-02 PROCEDURE — 70450 CT HEAD/BRAIN W/O DYE: CPT | Mod: TC

## 2022-06-02 PROCEDURE — 70450 CT HEAD WITHOUT CONTRAST: ICD-10-PCS | Mod: 26,,, | Performed by: RADIOLOGY

## 2022-06-02 RX ORDER — PSEUDOEPHEDRINE HCL 30 MG/1
30 TABLET, FILM COATED ORAL 2 TIMES DAILY
COMMUNITY
Start: 2022-05-18 | End: 2023-06-05

## 2022-06-02 RX ORDER — POLYETHYLENE GLYCOL-3350 AND ELECTROLYTES 236; 6.74; 5.86; 2.97; 22.74 G/274.31G; G/274.31G; G/274.31G; G/274.31G; G/274.31G
POWDER, FOR SOLUTION ORAL
COMMUNITY
Start: 2022-05-11 | End: 2023-06-05

## 2022-06-02 RX ORDER — AZELASTINE 1 MG/ML
1 SPRAY, METERED NASAL 2 TIMES DAILY PRN
COMMUNITY
Start: 2022-03-16 | End: 2023-06-05 | Stop reason: SDUPTHER

## 2022-06-02 RX ORDER — HYDROCODONE BITARTRATE AND ACETAMINOPHEN 10; 325 MG/1; MG/1
TABLET ORAL
COMMUNITY
Start: 2022-03-29 | End: 2023-06-05

## 2022-06-02 RX ORDER — TRAZODONE HYDROCHLORIDE 150 MG/1
150 TABLET ORAL NIGHTLY
COMMUNITY
Start: 2022-03-25 | End: 2023-06-05

## 2022-06-02 RX ORDER — VENLAFAXINE HYDROCHLORIDE 37.5 MG/1
CAPSULE, EXTENDED RELEASE ORAL
COMMUNITY
Start: 2022-03-25 | End: 2023-06-05

## 2022-06-02 RX ORDER — PSEUDOEPHEDRINE HCL 30 MG
30 TABLET ORAL
COMMUNITY
End: 2023-06-05

## 2022-06-02 RX ORDER — BUSPIRONE HYDROCHLORIDE 10 MG/1
10 TABLET ORAL 2 TIMES DAILY
COMMUNITY
Start: 2022-03-25 | End: 2023-06-05

## 2022-06-02 RX ORDER — CEFDINIR 300 MG/1
300 CAPSULE ORAL 2 TIMES DAILY
COMMUNITY
Start: 2022-02-08 | End: 2023-06-05

## 2022-06-02 RX ORDER — FAMOTIDINE 20 MG/1
20 TABLET, FILM COATED ORAL DAILY PRN
COMMUNITY
Start: 2022-01-07 | End: 2023-08-14

## 2022-06-03 NOTE — ED PROVIDER NOTES
Encounter Date: 6/2/2022       History     Chief Complaint   Patient presents with    Head Injury     Hit head on car door approx 30 mins PTA. Denies LOC. +hematoma to forehead.     Patient hit her head with a car door about 1 hour prior to admit.  She has a indentation on her forehead with a clear line where the door lower hit.  She was not knocked out or does not have a headache.  She has not had any nose bleeds.  His right over the frontal sinus.  The patient had sinus surgery 6 weeks ago per Dr. Grubbs.  She had both balloon and well as bone manipulation according the surgery.    Examination reveals a clear-cut margin that feels like bone.  Possible fracture by palpation.  No nasal changes at all.  Eyes full range of motion and no other deeper injury suspect        Review of patient's allergies indicates:   Allergen Reactions    Ketorolac tromethamine Other (See Comments)     Other reaction(s): Burning    Moxifloxacin Other (See Comments)     Heart racing.    Prevacid [lansoprazole] Itching     Past Medical History:   Diagnosis Date    Anxiety     Arthritis     Chronic sinusitis     Diabetes mellitus     Hypertension      Past Surgical History:   Procedure Laterality Date    BREAST BIOPSY      BREAST LUMPECTOMY Left     BUNIONECTOMY Bilateral     COLONOSCOPY      ESOPHAGOGASTRODUODENOSCOPY      ETHMOIDECTOMY Bilateral 2/7/2019    Procedure: ETHMOIDECTOMY;  Surgeon: Michael Grubbs MD;  Location: [facility] OR;  Service: ENT;  Laterality: Bilateral;  endoscopic anterior and posterior    FUNCTIONAL ENDOSCOPIC SINUS SURGERY (FESS) N/A 2/7/2019    Procedure: FESS (FUNCTIONAL ENDOSCOPIC SINUS SURGERY);  Surgeon: Michael Grubbs MD;  Location: [facility] OR;  Service: ENT;  Laterality: N/A;    FUNCTIONAL ENDOSCOPIC SINUS SURGERY (FESS) Bilateral 12/19/2019    Procedure: FESS (FUNCTIONAL ENDOSCOPIC SINUS SURGERY);  Surgeon: Michael Grubbs MD;  Location: [facility] OR;  Service: ENT;  Laterality: Bilateral;     MAXILLARY ANTROSTOMY Bilateral 2019    Procedure: MAXILLARY ANTROSTOMY;  Surgeon: Michael Grubbs MD;  Location: [facility] OR;  Service: ENT;  Laterality: Bilateral;    NASAL SEPTOPLASTY Bilateral 2019    Procedure: SEPTOPLASTY, NOSE;  Surgeon: Michael Grubbs MD;  Location: [facility] OR;  Service: ENT;  Laterality: Bilateral;    NASAL TURBINATE REDUCTION Bilateral 2019    Procedure: REDUCTION, NASAL TURBINATE;  Surgeon: Michael Grubbs MD;  Location: [facility] OR;  Service: ENT;  Laterality: Bilateral;    SINUS SURGERY      x4    TUBAL LIGATION      WRIST SURGERY Left      Family History   Problem Relation Age of Onset    Breast cancer Neg Hx      Social History     Tobacco Use    Smoking status: Former Smoker     Packs/day: 0.75     Years: 25.00     Pack years: 18.75     Types: Cigarettes     Quit date: 1999     Years since quittin.1    Smokeless tobacco: Never Used   Substance Use Topics    Alcohol use: Yes     Comment: social    Drug use: No     Review of Systems   Constitutional: Negative for fever.   HENT: Positive for facial swelling. Negative for sore throat.    Respiratory: Negative for shortness of breath.    Cardiovascular: Negative for chest pain.   Gastrointestinal: Negative for nausea.   Genitourinary: Negative for dysuria.   Musculoskeletal: Negative for back pain.   Skin: Negative for rash.   Neurological: Negative for weakness.   Hematological: Does not bruise/bleed easily.   All other systems reviewed and are negative.      Physical Exam     Initial Vitals   BP Pulse Resp Temp SpO2   22 2108 22 2106 22 2106 22 2106 22 210   (!) 175/86 74 20 98.2 °F (36.8 °C) 99 %      MAP       --                Physical Exam    Nursing note and vitals reviewed.  Constitutional: Vital signs are normal. She appears well-developed and well-nourished. She is active and cooperative.   HENT:   Head: Normocephalic and atraumatic.       Eyes: Conjunctivae and lids  are normal. Lids are everted and swept, no foreign bodies found.   Neck: Trachea normal. Neck supple.   Normal range of motion.   Full passive range of motion without pain.     Cardiovascular: Normal rate, regular rhythm, S1 normal, S2 normal, normal heart sounds, intact distal pulses and normal pulses.  No extrasystoles are present.    Abdominal: Abdomen is soft. Bowel sounds are normal.   Musculoskeletal:         General: Normal range of motion.      Cervical back: Full passive range of motion without pain, normal range of motion and neck supple.     Neurological: She is alert. She has normal reflexes. GCS eye subscore is 4. GCS verbal subscore is 5. GCS motor subscore is 6.   Skin: Skin is warm, dry and intact. Capillary refill takes less than 2 seconds.   Psychiatric: She has a normal mood and affect. Her speech is normal and behavior is normal. Cognition and memory are normal.         ED Course   Procedures  Labs Reviewed - No data to display       Imaging Results          CT Head Without Contrast (In process)               X-Rays:   Independently Interpreted Readings:   Head CT: No hemorrhage.  No skull fracture.  No acute stroke.     Medications - No data to display  Medical Decision Making:   Clinical Tests:   Radiological Study: Ordered and Reviewed  ED Management:  Patient comes in with a contusion injury to her forehead.  She had an indentation over the frontal bone, frontal sinuses.  She had recent sinus surgery was concerned she may have had injury.  CT scan was performed and was normal.  No further treatment necessary other than ice packs to the wound.                      Clinical Impression:       Contusion forehead          Goldy Toussaint MD  06/02/22 [id number]

## 2022-06-24 ENCOUNTER — TELEPHONE (OUTPATIENT)
Dept: ORTHOPEDICS | Facility: CLINIC | Age: 72
End: 2022-06-24
Payer: MEDICARE

## 2022-06-24 NOTE — TELEPHONE ENCOUNTER
Returned call. Patient did not answer and voice mail box is full. Unfortunately, August is the next open appointment due to the change in template, we can only schedule about 8-10 follow ups/established patients a day. The rest are 5-day change slots and new patient appointments. I can get her on the schedule for the next open appointment, but is is August. Patient was scheduled for 8/10/22 at 10:15 A.M. in [facility]. Her x-ray will be the day of her appointment with Dr. Oliver about 15-30 minutes prior to her appointment.    ----- Message from Camelia Shepherd sent at 6/24/2022 10:45 AM CDT -----  Contact: Patient  Type: Patient Call Back         Who called: Patient         What is the request in detail: calling to sched an appt to get x ray and apptfor thumb/hand swelling; needs before appts showing in august ; please advise         Can the clinic reply by [facility]? yes         Would the patient rather a call back or a response via [facility]? call         Best call back number: [phone]         Additional Information: needs asap; flexible           Thank You

## 2022-08-01 DIAGNOSIS — M79.641 PAIN OF RIGHT HAND: Primary | ICD-10-CM

## 2022-08-16 ENCOUNTER — TELEPHONE (OUTPATIENT)
Dept: PAIN MEDICINE | Facility: CLINIC | Age: 72
End: 2022-08-16
Payer: MEDICARE

## 2022-08-16 ENCOUNTER — TELEPHONE (OUTPATIENT)
Dept: GASTROENTEROLOGY | Facility: CLINIC | Age: 72
End: 2022-08-16
Payer: MEDICARE

## 2022-08-16 NOTE — TELEPHONE ENCOUNTER
----- Message from Charisse Garrett, Patient Care Assistant sent at 8/16/2022  8:10 AM CDT -----  Regarding: appointment  Contact: pt  Type:  Sooner Appointment Request    Caller is requesting a sooner appointment.  Caller declined first available appointment listed below.  Caller will not accept being placed on the waitlist and is requesting a message be sent to doctor.    Name of Caller:  pt   When is the first available appointment?  2022  Symptoms:  new pt est care - right hand pain   Best Call Back Number:  [phone] (home)     Additional Information:  please call pt to advise. Thanks!

## 2022-08-16 NOTE — TELEPHONE ENCOUNTER
Informed patient that Dr. agrawal does not treat hand pain and she should follow up with her orthopedic. She states she has hand surgery coming up in Oct. Advised patient to follow up with MD already providing her care for her hand. Verbalizes understanding.

## 2022-08-16 NOTE — TELEPHONE ENCOUNTER
8/16/22 2:10p Returned call to assist with scheduling appointment. Mailbox full.    ----- Message from Stacey M Lefort sent at 8/16/2022  1:13 PM CDT -----  Pt is returning a missed call - she can be reached at [phone].  Thank you.

## 2022-08-16 NOTE — TELEPHONE ENCOUNTER
----- Message from Nany Owen sent at 8/16/2022  2:38 PM CDT -----  Contact: Pt  Type:  Patient Returning Call    Who Called:  Pt  Who Left Message for Patient:  Arelis  Does the patient know what this is regarding?:  Yes  Best Call Back Number:  [phone]  Additional Information:  Please call back.  Thanks.

## 2022-09-18 NOTE — BRIEF OP NOTE
[facility] - Periop Services  Brief Operative Note     SUMMARY     Surgery Date: 12/19/2019     Surgeon(s) and Role:     * Michael Grubbs MD - Primary        Pre-op Diagnosis:  Chronic ethmoidal sinusitis [J32.2]    Post-op Diagnosis:  Post-Op Diagnosis Codes:     * Chronic ethmoidal sinusitis [J32.2]    Procedure(s) (LRB):  FESS (FUNCTIONAL ENDOSCOPIC SINUS SURGERY) (Bilateral)      Description of the findings of the procedure:  Chronic ethmoidal sinusitis [J32.2]      Estimated Blood Loss: * No values recorded between 12/19/2019  3:03 PM and 12/19/2019  3:36 PM *   PACU

## 2022-10-25 ENCOUNTER — HOSPITAL ENCOUNTER (EMERGENCY)
Facility: HOSPITAL | Age: 72
Discharge: HOME | End: 2022-10-25
Attending: EMERGENCY MEDICINE
Payer: MEDICARE

## 2022-10-25 VITALS
BODY MASS INDEX: 33.13 KG/M2 | OXYGEN SATURATION: 100 % | RESPIRATION RATE: 18 BRPM | HEART RATE: 75 BPM | WEIGHT: 180 LBS | TEMPERATURE: 99 F | DIASTOLIC BLOOD PRESSURE: 98 MMHG | SYSTOLIC BLOOD PRESSURE: 144 MMHG | HEIGHT: 62 IN

## 2022-10-25 DIAGNOSIS — R05.9 COUGH, UNSPECIFIED TYPE: ICD-10-CM

## 2022-10-25 DIAGNOSIS — J10.1 INFLUENZA A: Primary | ICD-10-CM

## 2022-10-25 LAB
HCV AB SERPL QL IA: NORMAL
HIV 1+2 AB+HIV1 P24 AG SERPL QL IA: NORMAL
INFLUENZA A, MOLECULAR: POSITIVE
INFLUENZA B, MOLECULAR: NEGATIVE
SARS-COV-2 RDRP RESP QL NAA+PROBE: NEGATIVE
SPECIMEN SOURCE: ABNORMAL

## 2022-10-25 PROCEDURE — 87389 HIV-1 AG W/HIV-1&-2 AB AG IA: CPT | Performed by: EMERGENCY MEDICINE

## 2022-10-25 PROCEDURE — 36415 COLL VENOUS BLD VENIPUNCTURE: CPT | Performed by: EMERGENCY MEDICINE

## 2022-10-25 PROCEDURE — 87502 INFLUENZA DNA AMP PROBE: CPT | Performed by: NURSE PRACTITIONER

## 2022-10-25 PROCEDURE — U0002 COVID-19 LAB TEST NON-CDC: HCPCS | Performed by: NURSE PRACTITIONER

## 2022-10-25 PROCEDURE — 86803 HEPATITIS C AB TEST: CPT | Performed by: EMERGENCY MEDICINE

## 2022-10-25 PROCEDURE — 99284 EMERGENCY DEPT VISIT MOD MDM: CPT

## 2022-10-25 RX ORDER — BENZONATATE 200 MG/1
200 CAPSULE ORAL 3 TIMES DAILY PRN
Qty: 30 CAPSULE | Refills: 0 | Status: SHIPPED | OUTPATIENT
Start: 2022-10-25 | End: 2022-11-04

## 2022-10-25 RX ORDER — OSELTAMIVIR PHOSPHATE 75 MG/1
75 CAPSULE ORAL 2 TIMES DAILY
Qty: 10 CAPSULE | Refills: 0 | Status: SHIPPED | OUTPATIENT
Start: 2022-10-25 | End: 2022-10-30

## 2022-10-25 NOTE — ED PROVIDER NOTES
"Encounter Date: 10/25/2022       History     Chief Complaint   Patient presents with    Sinusitis     Pt reports sinus problems x "a few days" taking otc meds with no relief.      The history is provided by the patient.   URI  The primary symptoms include cough and myalgias. The current episode started several days ago. This is a new problem.   The cough began 2 days ago. The cough is non-productive. There is nondescript sputum produced.   Myalgias began yesterday. The myalgias have been unchanged since their onset. The myalgias are generalized.   Symptoms associated with the illness include facial pain, sinus pressure, congestion and rhinorrhea. The following treatments were addressed: Acetaminophen was not tried. A decongestant was not tried. Aspirin was not tried. NSAIDs were not tried. Risk factors for severe complications from URI include being elderly.   Review of patient's allergies indicates:   Allergen Reactions    Ketorolac tromethamine Other (See Comments)     Other reaction(s): Burning    Moxifloxacin Other (See Comments)     Heart racing.    Codeine     Prevacid [lansoprazole] Itching     Past Medical History:   Diagnosis Date    Anxiety     Arthritis     Chronic sinusitis     Diabetes mellitus     Hypertension      Past Surgical History:   Procedure Laterality Date    BREAST BIOPSY      BREAST LUMPECTOMY Left     BUNIONECTOMY Bilateral     COLONOSCOPY      ESOPHAGOGASTRODUODENOSCOPY      ETHMOIDECTOMY Bilateral 2/7/2019    Procedure: ETHMOIDECTOMY;  Surgeon: Michael Grubbs MD;  Location: [facility] OR;  Service: ENT;  Laterality: Bilateral;  endoscopic anterior and posterior    FUNCTIONAL ENDOSCOPIC SINUS SURGERY (FESS) N/A 2/7/2019    Procedure: FESS (FUNCTIONAL ENDOSCOPIC SINUS SURGERY);  Surgeon: Michael Grubbs MD;  Location: [facility] OR;  Service: ENT;  Laterality: N/A;    FUNCTIONAL ENDOSCOPIC SINUS SURGERY (FESS) Bilateral 12/19/2019    Procedure: FESS (FUNCTIONAL ENDOSCOPIC SINUS SURGERY);  " Surgeon: Michael Grubbs MD;  Location: [facility] OR;  Service: ENT;  Laterality: Bilateral;    MAXILLARY ANTROSTOMY Bilateral 2019    Procedure: MAXILLARY ANTROSTOMY;  Surgeon: Michael Grubbs MD;  Location: [facility] OR;  Service: ENT;  Laterality: Bilateral;    NASAL SEPTOPLASTY Bilateral 2019    Procedure: SEPTOPLASTY, NOSE;  Surgeon: Michael Grubbs MD;  Location: [facility] OR;  Service: ENT;  Laterality: Bilateral;    NASAL TURBINATE REDUCTION Bilateral 2019    Procedure: REDUCTION, NASAL TURBINATE;  Surgeon: Michael Grubbs MD;  Location: [facility] OR;  Service: ENT;  Laterality: Bilateral;    SINUS SURGERY      x4    TUBAL LIGATION      WRIST SURGERY Left      Family History   Problem Relation Age of Onset    Breast cancer Neg Hx      Social History     Tobacco Use    Smoking status: Former     Packs/day: 0.75     Years: 25.00     Pack years: 18.75     Types: Cigarettes     Quit date: 1999     Years since quittin.5    Smokeless tobacco: Never   Substance Use Topics    Alcohol use: Yes     Comment: social    Drug use: No     Review of Systems   HENT:  Positive for congestion, rhinorrhea and sinus pressure.    Respiratory:  Positive for cough.    Musculoskeletal:  Positive for myalgias.   All other systems reviewed and are negative.    Physical Exam     Initial Vitals [10/25/22 1039]   BP Pulse Resp Temp SpO2   (!) 144/98 75 18 98.7 °F (37.1 °C) 100 %      MAP       --         Physical Exam    Nursing note and vitals reviewed.  Constitutional: She appears well-developed and well-nourished. No distress.   HENT:   Head: Normocephalic and atraumatic.   Nose: Rhinorrhea and sinus tenderness present.   Mouth/Throat: Uvula is midline and mucous membranes are normal. Posterior oropharyngeal erythema present.   Eyes: EOM are normal. Pupils are equal, round, and reactive to light.   Neck:   Normal range of motion.  Cardiovascular:  Normal rate and regular rhythm.           No murmur  heard.  Pulmonary/Chest: Breath sounds normal. No respiratory distress. She has no wheezes. She has no rhonchi. She has no rales. She exhibits no tenderness.   Abdominal: Abdomen is soft.   Musculoskeletal:         General: Normal range of motion.      Cervical back: Normal range of motion.     Neurological: She is alert and oriented to person, place, and time. She has normal strength. GCS score is 15. GCS eye subscore is 4. GCS verbal subscore is 5. GCS motor subscore is 6.   Skin: Skin is warm and dry.   Psychiatric: She has a normal mood and affect.       ED Course   Procedures  Labs Reviewed   INFLUENZA A & B BY MOLECULAR - Abnormal; Notable for the following components:       Result Value    Influenza A, Molecular Positive (*)     All other components within normal limits   SARS-COV-2 RNA AMPLIFICATION, QUAL    Narrative:     Is the patient symptomatic?->Yes   HIV 1 / 2 ANTIBODY   HEPATITIS C ANTIBODY          Imaging Results    None          Medications - No data to display                           Clinical Impression:   Final diagnoses:  [J10.1] Influenza A (Primary)  [R05.9] Cough, unspecified type        ED Disposition Condition    Discharge Stable          ED Prescriptions       Medication Sig Dispense Start Date End Date Auth. Provider    oseltamivir (TAMIFLU) 75 MG capsule Take 1 capsule (75 mg total) by mouth 2 (two) times daily. for 5 days 10 capsule 10/25/2022 10/30/2022 Paul Rios NP    benzonatate (TESSALON) 200 MG capsule Take 1 capsule (200 mg total) by mouth 3 (three) times daily as needed. 30 capsule 10/25/2022 11/4/2022 Paul Rios NP          Follow-up Information       Follow up With Specialties Details Why Contact Info    Uzair Ware NP Family Medicine In 2 days  [address]  [facility] [place] [address]  [phone]      [facility] Emergency Dept Emergency Medicine  If symptoms worsen [address] [facility] [address] [phone]             Paul Rios NP  10/25/22  2096

## 2022-10-30 ENCOUNTER — HOSPITAL ENCOUNTER (EMERGENCY)
Facility: HOSPITAL | Age: 72
Discharge: HOME | End: 2022-10-30
Payer: MEDICARE

## 2022-10-30 VITALS
WEIGHT: 170 LBS | RESPIRATION RATE: 18 BRPM | BODY MASS INDEX: 31.28 KG/M2 | HEART RATE: 70 BPM | HEIGHT: 62 IN | OXYGEN SATURATION: 97 % | DIASTOLIC BLOOD PRESSURE: 67 MMHG | SYSTOLIC BLOOD PRESSURE: 116 MMHG | TEMPERATURE: 100 F

## 2022-10-30 DIAGNOSIS — J32.9 SINUSITIS, UNSPECIFIED CHRONICITY, UNSPECIFIED LOCATION: Primary | ICD-10-CM

## 2022-10-30 PROCEDURE — 99283 EMERGENCY DEPT VISIT LOW MDM: CPT

## 2022-10-30 RX ORDER — CEFDINIR 300 MG/1
300 CAPSULE ORAL 2 TIMES DAILY
Qty: 14 CAPSULE | Refills: 0 | Status: SHIPPED | OUTPATIENT
Start: 2022-10-30 | End: 2022-11-09

## 2022-10-30 NOTE — DISCHARGE INSTRUCTIONS
Take antibiotics as prescribed.    Continue taking all prescribed medication as directed.    Be sure to use her nasal spray every morning.    Follow-up primary care provider if not feeling better 4-5 days after starting antibiotics.    Return emergency room if develops any new or worsening symptoms.

## 2022-10-30 NOTE — ED PROVIDER NOTES
Encounter Date: 10/30/2022       History     Chief Complaint   Patient presents with    Nasal Congestion     Pt reports nasal congestion, cough since yesterday. Reports she was dx'ed with flu on Monday. Last dose of tamiflu yesterday.      Patient 9-year-old female presents emergency room with sinus congestion and green nasal drainage.  Patient states she was seen earlier this week and was diagnosed with the flu.  Patient still running low-grade fevers, noted to be 9.5 at triage.  She denies any shortness of breath, chest pain, vomiting or diarrhea.  States she has been nauseated secondary to sinus drainage with decreased appetite.  No dysuria discharge.    Review of patient's allergies indicates:   Allergen Reactions    Ketorolac tromethamine Other (See Comments)     Other reaction(s): Burning    Moxifloxacin Other (See Comments)     Heart racing.    Codeine     Prevacid [lansoprazole] Itching     Past Medical History:   Diagnosis Date    Anxiety     Arthritis     Chronic sinusitis     Diabetes mellitus     Hypertension      Past Surgical History:   Procedure Laterality Date    BREAST BIOPSY      BREAST LUMPECTOMY Left     BUNIONECTOMY Bilateral     COLONOSCOPY      ESOPHAGOGASTRODUODENOSCOPY      ETHMOIDECTOMY Bilateral 2/7/2019    Procedure: ETHMOIDECTOMY;  Surgeon: Michael Grubbs MD;  Location: [facility] OR;  Service: ENT;  Laterality: Bilateral;  endoscopic anterior and posterior    FUNCTIONAL ENDOSCOPIC SINUS SURGERY (FESS) N/A 2/7/2019    Procedure: FESS (FUNCTIONAL ENDOSCOPIC SINUS SURGERY);  Surgeon: Michael Grubbs MD;  Location: [facility] OR;  Service: ENT;  Laterality: N/A;    FUNCTIONAL ENDOSCOPIC SINUS SURGERY (FESS) Bilateral 12/19/2019    Procedure: FESS (FUNCTIONAL ENDOSCOPIC SINUS SURGERY);  Surgeon: Michael Grubbs MD;  Location: [facility] OR;  Service: ENT;  Laterality: Bilateral;    MAXILLARY ANTROSTOMY Bilateral 2/7/2019    Procedure: MAXILLARY ANTROSTOMY;  Surgeon: Michael Grubbs MD;  Location:  [facility] OR;  Service: ENT;  Laterality: Bilateral;    NASAL SEPTOPLASTY Bilateral 2019    Procedure: SEPTOPLASTY, NOSE;  Surgeon: Michael Grubbs MD;  Location: [facility] OR;  Service: ENT;  Laterality: Bilateral;    NASAL TURBINATE REDUCTION Bilateral 2019    Procedure: REDUCTION, NASAL TURBINATE;  Surgeon: Michael Grubsb MD;  Location: [facility] OR;  Service: ENT;  Laterality: Bilateral;    SINUS SURGERY      x4    TUBAL LIGATION      WRIST SURGERY Left      Family History   Problem Relation Age of Onset    Breast cancer Neg Hx      Social History     Tobacco Use    Smoking status: Former     Packs/day: 0.75     Years: 25.00     Pack years: 18.75     Types: Cigarettes     Quit date: 1999     Years since quittin.5    Smokeless tobacco: Never   Substance Use Topics    Alcohol use: Yes     Comment: social    Drug use: No     Review of Systems   Constitutional:  Positive for appetite change and fatigue. Negative for activity change, chills and fever.   HENT:  Positive for congestion, postnasal drip, rhinorrhea, sinus pressure and sore throat. Negative for trouble swallowing and voice change.    Eyes: Negative.    Respiratory:  Positive for cough. Negative for chest tightness, shortness of breath, wheezing and stridor.    Cardiovascular: Negative.    Gastrointestinal: Negative.    Endocrine: Negative.    Genitourinary: Negative.    Musculoskeletal: Negative.    Skin: Negative.    Allergic/Immunologic: Negative for food allergies.   Neurological: Negative.    Hematological: Negative.    Psychiatric/Behavioral: Negative.     All other systems reviewed and are negative.    Physical Exam     Initial Vitals [10/30/22 1424]   BP Pulse Resp Temp SpO2   116/67 70 18 99.5 °F (37.5 °C) 97 %      MAP       --         Physical Exam    Nursing note and vitals reviewed.  Constitutional: She appears well-developed and well-nourished. She is not diaphoretic.   HENT:   Head: Normocephalic and atraumatic.   Eyes:  Conjunctivae are normal. Pupils are equal, round, and reactive to light. Right eye exhibits no discharge. Left eye exhibits no discharge. No scleral icterus.   Neck: No thyromegaly present. No tracheal deviation present.   Normal range of motion.  Cardiovascular:  Normal rate, regular rhythm and normal heart sounds.           No murmur heard.  Pulmonary/Chest: Breath sounds normal. No respiratory distress. She has no wheezes. She has no rhonchi. She has no rales.   Abdominal: Abdomen is soft.   Musculoskeletal:      Cervical back: Normal range of motion.     Lymphadenopathy:     She has no cervical adenopathy.   Neurological: She is alert and oriented to person, place, and time. She has normal strength. GCS score is 15. GCS eye subscore is 4. GCS verbal subscore is 5. GCS motor subscore is 6.   Skin: Skin is warm and dry. Capillary refill takes 2 to 3 seconds. No rash noted.   Psychiatric: She has a normal mood and affect.       ED Course   Procedures  Labs Reviewed - No data to display       Imaging Results    None          Medications - No data to display  Medical Decision Making:   Initial Assessment:   Patient seen examined emergency room.  Patient appears in no acute distress this time.  Assessment as noted above.  Differential Diagnosis:   Flu, sinusitis, COVID, viral illness  ED Management:  After patient was examined, previous results reviewed.  Will treat patient for sinusitis post flu infection.  Will give the patient cefdinir for 7 days, courage to use her nasal spray every morning.  She will follow up with primary care provider in 5-7 days if no improvement.                        Clinical Impression:   Final diagnoses:  [J32.9] Sinusitis, unspecified chronicity, unspecified location (Primary)      ED Disposition Condition    Discharge Stable          ED Prescriptions       Medication Sig Dispense Start Date End Date Auth. Provider    cefdinir (OMNICEF) 300 MG capsule Take 1 capsule (300 mg total) by  mouth 2 (two) times daily. for 10 days 14 capsule 10/30/2022 11/9/2022 Jerardo Fox NP          Follow-up Information       Follow up With Specialties Details Why Contact Info    Uzair Ware NP Family Medicine In 1 week If symptoms worsen, As needed [address]  [place] [address]  [phone]               Jerardo Fox NP  10/30/22 1515

## 2023-03-07 ENCOUNTER — HOSPITAL ENCOUNTER (EMERGENCY)
Facility: HOSPITAL | Age: 73
Discharge: HOME | End: 2023-03-07
Payer: MEDICARE

## 2023-03-07 VITALS
RESPIRATION RATE: 18 BRPM | SYSTOLIC BLOOD PRESSURE: 133 MMHG | WEIGHT: 175 LBS | OXYGEN SATURATION: 99 % | HEART RATE: 66 BPM | HEIGHT: 62 IN | DIASTOLIC BLOOD PRESSURE: 69 MMHG | TEMPERATURE: 98 F | BODY MASS INDEX: 32.2 KG/M2

## 2023-03-07 DIAGNOSIS — J06.9 UPPER RESPIRATORY TRACT INFECTION, UNSPECIFIED TYPE: ICD-10-CM

## 2023-03-07 DIAGNOSIS — K52.1 DIARRHEA DUE TO DRUG: ICD-10-CM

## 2023-03-07 DIAGNOSIS — R05.9 COUGH, UNSPECIFIED TYPE: Primary | ICD-10-CM

## 2023-03-07 LAB
ALBUMIN SERPL BCP-MCNC: 3.8 G/DL (ref 3.5–5.2)
ALP SERPL-CCNC: 101 U/L (ref 55–135)
ALT SERPL W/O P-5'-P-CCNC: 17 U/L (ref 10–44)
ANION GAP SERPL CALC-SCNC: 10 MMOL/L (ref 8–16)
AST SERPL-CCNC: 32 U/L (ref 10–40)
BASOPHILS # BLD AUTO: 0.05 K/UL (ref 0–0.2)
BASOPHILS NFR BLD: 0.7 % (ref 0–1.9)
BILIRUB SERPL-MCNC: 0.2 MG/DL (ref 0.1–1)
BUN SERPL-MCNC: 9 MG/DL (ref 8–23)
CALCIUM SERPL-MCNC: 9.3 MG/DL (ref 8.7–10.5)
CHLORIDE SERPL-SCNC: 103 MMOL/L (ref 95–110)
CO2 SERPL-SCNC: 22 MMOL/L (ref 23–29)
CREAT SERPL-MCNC: 0.8 MG/DL (ref 0.5–1.4)
DIFFERENTIAL METHOD: ABNORMAL
EOSINOPHIL # BLD AUTO: 0.2 K/UL (ref 0–0.5)
EOSINOPHIL NFR BLD: 2.3 % (ref 0–8)
ERYTHROCYTE [DISTWIDTH] IN BLOOD BY AUTOMATED COUNT: 20.3 % (ref 11.5–14.5)
EST. GFR  (NO RACE VARIABLE): >60 ML/MIN/1.73 M^2
GLUCOSE SERPL-MCNC: 140 MG/DL (ref 70–110)
HCT VFR BLD AUTO: 39.1 % (ref 37–48.5)
HGB BLD-MCNC: 11.7 G/DL (ref 12–16)
IMM GRANULOCYTES # BLD AUTO: 0.02 K/UL (ref 0–0.04)
IMM GRANULOCYTES NFR BLD AUTO: 0.3 % (ref 0–0.5)
LYMPHOCYTES # BLD AUTO: 2.3 K/UL (ref 1–4.8)
LYMPHOCYTES NFR BLD: 31.6 % (ref 18–48)
MAGNESIUM SERPL-MCNC: 2.1 MG/DL (ref 1.6–2.6)
MCH RBC QN AUTO: 24.1 PG (ref 27–31)
MCHC RBC AUTO-ENTMCNC: 29.9 G/DL (ref 32–36)
MCV RBC AUTO: 81 FL (ref 82–98)
MONOCYTES # BLD AUTO: 1.4 K/UL (ref 0.3–1)
MONOCYTES NFR BLD: 18.5 % (ref 4–15)
NEUTROPHILS # BLD AUTO: 3.5 K/UL (ref 1.8–7.7)
NEUTROPHILS NFR BLD: 46.6 % (ref 38–73)
NRBC BLD-RTO: 0 /100 WBC
PLATELET # BLD AUTO: 291 K/UL (ref 150–450)
PMV BLD AUTO: 10.2 FL (ref 9.2–12.9)
POTASSIUM SERPL-SCNC: 5.1 MMOL/L (ref 3.5–5.1)
PROT SERPL-MCNC: 8.8 G/DL (ref 6–8.4)
RBC # BLD AUTO: 4.86 M/UL (ref 4–5.4)
SODIUM SERPL-SCNC: 135 MMOL/L (ref 136–145)
WBC # BLD AUTO: 7.41 K/UL (ref 3.9–12.7)

## 2023-03-07 PROCEDURE — 25000003 PHARM REV CODE 250: Performed by: NURSE PRACTITIONER

## 2023-03-07 PROCEDURE — 80053 COMPREHEN METABOLIC PANEL: CPT | Performed by: NURSE PRACTITIONER

## 2023-03-07 PROCEDURE — 83735 ASSAY OF MAGNESIUM: CPT | Performed by: NURSE PRACTITIONER

## 2023-03-07 PROCEDURE — 96360 HYDRATION IV INFUSION INIT: CPT

## 2023-03-07 PROCEDURE — 99284 EMERGENCY DEPT VISIT MOD MDM: CPT | Mod: 25

## 2023-03-07 PROCEDURE — 85025 COMPLETE CBC W/AUTO DIFF WBC: CPT | Performed by: NURSE PRACTITIONER

## 2023-03-07 RX ORDER — AZITHROMYCIN 250 MG/1
250 TABLET, FILM COATED ORAL DAILY
Qty: 6 TABLET | Refills: 0 | Status: SHIPPED | OUTPATIENT
Start: 2023-03-07 | End: 2023-06-05

## 2023-03-07 RX ORDER — BENZONATATE 200 MG/1
200 CAPSULE ORAL 3 TIMES DAILY PRN
Qty: 20 CAPSULE | Refills: 0 | Status: SHIPPED | OUTPATIENT
Start: 2023-03-07 | End: 2023-03-17

## 2023-03-07 RX ORDER — LORATADINE 10 MG/1
10 TABLET ORAL DAILY
Qty: 30 TABLET | Refills: 0 | Status: SHIPPED | OUTPATIENT
Start: 2023-03-07 | End: 2023-06-05

## 2023-03-07 RX ORDER — DIPHENOXYLATE HYDROCHLORIDE AND ATROPINE SULFATE 2.5; .025 MG/1; MG/1
1 TABLET ORAL
Status: COMPLETED | OUTPATIENT
Start: 2023-03-07 | End: 2023-03-07

## 2023-03-07 RX ADMIN — DIPHENOXYLATE HYDROCHLORIDE AND ATROPINE SULFATE 1 TABLET: 2.5; .025 TABLET ORAL at 10:03

## 2023-03-07 RX ADMIN — SODIUM CHLORIDE 1000 ML: 9 INJECTION, SOLUTION INTRAVENOUS at 10:03

## 2023-03-07 NOTE — ED PROVIDER NOTES
Encounter Date: 3/7/2023       History     Chief Complaint   Patient presents with    General Illness     Pt reports being on augmentin for a sinus infection since last weds. Pt reports last dose yesterday am due to severe diarrhea. Pt also reports sinus infection not improving.      Patient is 72-year-old female presents emergency room with diarrhea for the past 2 days.  Patient states she was seen last Wednesday was diagnosed with sinus infection and was prescribed Augmentin.  Patient states start taking Augmentin on Thursday , and has been having significant diarrhea over the past 2 days.  Patient states she is now weak, still has some sinus drainage that is greenish yellow in color.  Patient still has a cough secondary to postnasal drip.  Patient states she is already had diarrhea around 5 times today.  Patient states she has not taking the Augmentin since yesterday morning thinking that that may be causing her diarrhea.  Patient states she tested negative for COVID last week.  Patient states she has been using Flonase nasal spray, but has not taken anything else for allergies.  Patient denies having any fevers, chills, nausea vomiting, chest pain, shortness of breath.    Review of patient's allergies indicates:   Allergen Reactions    Ketorolac tromethamine Other (See Comments)     Other reaction(s): Burning    Moxifloxacin Other (See Comments)     Heart racing.    Codeine     Prevacid [lansoprazole] Itching     Past Medical History:   Diagnosis Date    Anxiety     Arthritis     Chronic sinusitis     Diabetes mellitus     Hypertension      Past Surgical History:   Procedure Laterality Date    BREAST BIOPSY      BREAST LUMPECTOMY Left     BUNIONECTOMY Bilateral     COLONOSCOPY      ESOPHAGOGASTRODUODENOSCOPY      ETHMOIDECTOMY Bilateral 2/7/2019    Procedure: ETHMOIDECTOMY;  Surgeon: Michael Grubbs MD;  Location: [facility] OR;  Service: ENT;  Laterality: Bilateral;  endoscopic anterior and posterior    FUNCTIONAL  ENDOSCOPIC SINUS SURGERY (FESS) N/A 2019    Procedure: FESS (FUNCTIONAL ENDOSCOPIC SINUS SURGERY);  Surgeon: Michael Grubbs MD;  Location: [facility] OR;  Service: ENT;  Laterality: N/A;    FUNCTIONAL ENDOSCOPIC SINUS SURGERY (FESS) Bilateral 2019    Procedure: FESS (FUNCTIONAL ENDOSCOPIC SINUS SURGERY);  Surgeon: Michael Grubbs MD;  Location: [facility] OR;  Service: ENT;  Laterality: Bilateral;    MAXILLARY ANTROSTOMY Bilateral 2019    Procedure: MAXILLARY ANTROSTOMY;  Surgeon: Michael Grubbs MD;  Location: [facility] OR;  Service: ENT;  Laterality: Bilateral;    NASAL SEPTOPLASTY Bilateral 2019    Procedure: SEPTOPLASTY, NOSE;  Surgeon: Michael Grubbs MD;  Location: [facility] OR;  Service: ENT;  Laterality: Bilateral;    NASAL TURBINATE REDUCTION Bilateral 2019    Procedure: REDUCTION, NASAL TURBINATE;  Surgeon: Michael Grubbs MD;  Location: [facility] OR;  Service: ENT;  Laterality: Bilateral;    SINUS SURGERY      x4    TUBAL LIGATION      WRIST SURGERY Left      Family History   Problem Relation Age of Onset    Breast cancer Neg Hx      Social History     Tobacco Use    Smoking status: Former     Packs/day: 0.75     Years: 25.00     Pack years: 18.75     Types: Cigarettes     Quit date: 1999     Years since quittin.9    Smokeless tobacco: Never   Substance Use Topics    Alcohol use: Yes     Comment: social    Drug use: No     Review of Systems   Constitutional: Negative.    HENT:  Positive for congestion and postnasal drip. Negative for sore throat and trouble swallowing.    Eyes: Negative.    Gastrointestinal:  Positive for diarrhea. Negative for abdominal distention, abdominal pain, nausea and vomiting.   Endocrine: Negative.    Genitourinary: Negative.    Musculoskeletal: Negative.    Skin: Negative.    Allergic/Immunologic: Negative for food allergies.   Neurological: Negative.    Hematological: Negative.    Psychiatric/Behavioral: Negative.     All other systems reviewed and are  negative.    Physical Exam     Initial Vitals [03/07/23 0936]   BP Pulse Resp Temp SpO2   133/69 66 18 98 °F (36.7 °C) 99 %      MAP       --         Physical Exam    Nursing note and vitals reviewed.  Constitutional: She appears well-developed and well-nourished. She is not diaphoretic. No distress.   HENT:   Head: Normocephalic and atraumatic.   Mouth/Throat: Oropharynx is clear and moist.   Patient does have some yellow tinged postnasal drip noted.   Eyes: Conjunctivae and EOM are normal. Pupils are equal, round, and reactive to light. No scleral icterus.   Neck: No thyromegaly present.   Normal range of motion.  Cardiovascular:  Normal rate, regular rhythm, normal heart sounds and intact distal pulses.     Exam reveals no friction rub.       No murmur heard.  Pulmonary/Chest: Breath sounds normal. No respiratory distress. She has no wheezes. She has no rhonchi. She has no rales. She exhibits no tenderness.   Abdominal: Abdomen is soft. Bowel sounds are normal. She exhibits no distension. There is no abdominal tenderness.   Musculoskeletal:         General: No edema. Normal range of motion.      Cervical back: Normal range of motion.     Lymphadenopathy:     She has no cervical adenopathy.   Neurological: She is alert and oriented to person, place, and time. She has normal strength. No sensory deficit. GCS score is 15. GCS eye subscore is 4. GCS verbal subscore is 5. GCS motor subscore is 6.   Skin: Skin is warm and dry. Capillary refill takes 2 to 3 seconds.   Psychiatric: She has a normal mood and affect.       ED Course   Procedures  Labs Reviewed   CBC W/ AUTO DIFFERENTIAL - Abnormal; Notable for the following components:       Result Value    Hemoglobin 11.7 (*)     MCV 81 (*)     MCH 24.1 (*)     MCHC 29.9 (*)     RDW 20.3 (*)     Mono # 1.4 (*)     Mono % 18.5 (*)     All other components within normal limits   COMPREHENSIVE METABOLIC PANEL - Abnormal; Notable for the following components:    Sodium 135  (*)     CO2 22 (*)     Glucose 140 (*)     Total Protein 8.8 (*)     All other components within normal limits   MAGNESIUM          Imaging Results    None          Medications   sodium chloride 0.9% bolus 1,000 mL 1,000 mL (0 mLs Intravenous Stopped 3/7/23 1142)   diphenoxylate-atropine 2.5-0.025 mg per tablet 1 tablet (1 tablet Oral Given 3/7/23 1050)     Medical Decision Making:   Initial Assessment:   Patient seen examined emergency room, no acute distress this time.  Detailed assessment as noted above.  Differential Diagnosis:   Flu, COVID, strep, sinusitis, diarrhea secondary to medication, gastritis, GERD  Clinical Tests:   Lab Tests: Ordered and Reviewed  The following lab test(s) were unremarkable: CBC and CMP  ED Management:  Patient seen examined emergency room, labs ordered.  Will give the patient 1 liter normal saline for hydration.    Labs reviewed, mostly unremarkable.  Will change the patient's antibiotics to azithromycin.  Advised patient to take azithromycin until finished.  Continue using Flonase every morning.  Will give the patient Claritin to taking afternoon day with sinus symptoms and allergies.  Follow up with the primary care provider in 3-5 days if no improvement after starting the p.o. antibiotics.  Continue to drink plenty of fluids stay hydrated.  She may use Imodium over-the-counter if she continues to have diarrhea.  Patient verbalized understanding of treatment plan.    Patient asked for something for cough, prescribed the patient Erika Pierson.                        Clinical Impression:   Final diagnoses:  [R05.9] Cough, unspecified type (Primary)  [J06.9] Upper respiratory tract infection, unspecified type  [K52.1] Diarrhea due to drug        ED Disposition Condition    Discharge Stable          ED Prescriptions       Medication Sig Dispense Start Date End Date Auth. Provider    azithromycin (Z-YESSICA) 250 MG tablet Take 1 tablet (250 mg total) by mouth once daily. Take first 2  tablets together, then 1 every day until finished. 6 tablet 3/7/2023 -- Jerardo Fox NP    loratadine (CLARITIN) 10 mg tablet Take 1 tablet (10 mg total) by mouth once daily. 30 tablet 3/7/2023 3/6/2024 Jerardo Fox NP          Follow-up Information       Follow up With Specialties Details Why Contact Info    Uzair Ware NP Family Medicine In 1 week If symptoms worsen, As needed [address]  [place] [address]  [phone]               Jerardo Fox NP  03/07/23 1201       Jerardo Fox NP  03/07/23 1204

## 2023-03-07 NOTE — DISCHARGE INSTRUCTIONS
Take medications as prescribed.    Be sure to finish antibiotics.    Follow-up primary care provider 3-5 days if no improvement.    Return emergency room if symptoms worsen, you develop any new other worrisome symptom.

## 2023-03-09 ENCOUNTER — HOSPITAL ENCOUNTER (EMERGENCY)
Facility: HOSPITAL | Age: 73
Discharge: HOME | End: 2023-03-09
Attending: EMERGENCY MEDICINE
Payer: MEDICARE

## 2023-03-09 VITALS
WEIGHT: 160 LBS | BODY MASS INDEX: 29.44 KG/M2 | HEART RATE: 75 BPM | TEMPERATURE: 100 F | HEIGHT: 62 IN | OXYGEN SATURATION: 95 % | DIASTOLIC BLOOD PRESSURE: 74 MMHG | RESPIRATION RATE: 20 BRPM | SYSTOLIC BLOOD PRESSURE: 141 MMHG

## 2023-03-09 DIAGNOSIS — J06.9 UPPER RESPIRATORY TRACT INFECTION, UNSPECIFIED TYPE: Primary | ICD-10-CM

## 2023-03-09 DIAGNOSIS — R05.9 COUGH: ICD-10-CM

## 2023-03-09 LAB
INFLUENZA A, MOLECULAR: NEGATIVE
INFLUENZA B, MOLECULAR: NEGATIVE
POCT GLUCOSE: 163 MG/DL (ref 70–110)
SARS-COV-2 RDRP RESP QL NAA+PROBE: NEGATIVE
SPECIMEN SOURCE: NORMAL

## 2023-03-09 PROCEDURE — 99283 EMERGENCY DEPT VISIT LOW MDM: CPT | Mod: 25

## 2023-03-09 PROCEDURE — U0002 COVID-19 LAB TEST NON-CDC: HCPCS | Performed by: NURSE PRACTITIONER

## 2023-03-09 PROCEDURE — 71046 X-RAY EXAM CHEST 2 VIEWS: CPT | Mod: TC

## 2023-03-09 PROCEDURE — 87502 INFLUENZA DNA AMP PROBE: CPT | Performed by: NURSE PRACTITIONER

## 2023-03-09 PROCEDURE — 82962 GLUCOSE BLOOD TEST: CPT

## 2023-03-09 PROCEDURE — 71046 XR CHEST PA AND LATERAL: ICD-10-PCS | Mod: 26,,, | Performed by: RADIOLOGY

## 2023-03-09 PROCEDURE — 71046 X-RAY EXAM CHEST 2 VIEWS: CPT | Mod: 26,,, | Performed by: RADIOLOGY

## 2023-03-09 RX ORDER — GUAIFENESIN 600 MG/1
600 TABLET, EXTENDED RELEASE ORAL 2 TIMES DAILY
Qty: 30 TABLET | Refills: 0 | Status: SHIPPED | OUTPATIENT
Start: 2023-03-09 | End: 2023-06-05

## 2023-03-09 NOTE — ED PROVIDER NOTES
Encounter Date: 3/9/2023       History     Chief Complaint   Patient presents with    Nasal Congestion     Nasal congestion x 1 week    Cough     Was seen here a couple days ago for same. Was given abx. Not feeling better.     Is a 72-year-old female presents emergency room with continued nasal congestion, and feeling bad in general.  Patient was just seen in the ER by me on 03/07/2023 with complaint of diarrhea after being placed on Augmentin for sinus infection by urgent care.  At that time patient was using Flonase, and she states she is still using her Flonase twice a day now, as well as the Claritin that I prescribed her 2 days ago.  Patient is also taking the azithromycin that I changed her antibiotics to help with her diarrhea, patient states she still has 2 days if that antibiotic left.  Patient states she is running low-grade fevers, temperature on triage noted to be 100.1.  Patient states she just feels significantly congested in her sinuses and has not went away, and has a mild headache today.  Patient was not checked for flu or COVID on last visit secondary to being negative at the primary care's office several days before presented to the emergency room.  Patient denies any nausea, vomiting, diarrhea today.  Patient states she still has a cough, that is nonproductive.  She denies any dysuria or discharge.    Review of patient's allergies indicates:   Allergen Reactions    Ketorolac tromethamine Other (See Comments)     Other reaction(s): Burning    Moxifloxacin Other (See Comments)     Heart racing.    Codeine     Prevacid [lansoprazole] Itching     Past Medical History:   Diagnosis Date    Anxiety     Arthritis     Chronic sinusitis     Diabetes mellitus     Hypertension      Past Surgical History:   Procedure Laterality Date    BREAST BIOPSY      BREAST LUMPECTOMY Left     BUNIONECTOMY Bilateral     COLONOSCOPY      ESOPHAGOGASTRODUODENOSCOPY      ETHMOIDECTOMY Bilateral 2/7/2019    Procedure:  ETHMOIDECTOMY;  Surgeon: Michael Grubbs MD;  Location: [facility] OR;  Service: ENT;  Laterality: Bilateral;  endoscopic anterior and posterior    FUNCTIONAL ENDOSCOPIC SINUS SURGERY (FESS) N/A 2019    Procedure: FESS (FUNCTIONAL ENDOSCOPIC SINUS SURGERY);  Surgeon: Michael Grubbs MD;  Location: [facility] OR;  Service: ENT;  Laterality: N/A;    FUNCTIONAL ENDOSCOPIC SINUS SURGERY (FESS) Bilateral 2019    Procedure: FESS (FUNCTIONAL ENDOSCOPIC SINUS SURGERY);  Surgeon: Michael Grubbs MD;  Location: [facility] OR;  Service: ENT;  Laterality: Bilateral;    MAXILLARY ANTROSTOMY Bilateral 2019    Procedure: MAXILLARY ANTROSTOMY;  Surgeon: Michael Grubbs MD;  Location: [facility] OR;  Service: ENT;  Laterality: Bilateral;    NASAL SEPTOPLASTY Bilateral 2019    Procedure: SEPTOPLASTY, NOSE;  Surgeon: Michael Grubbs MD;  Location: [facility] OR;  Service: ENT;  Laterality: Bilateral;    NASAL TURBINATE REDUCTION Bilateral 2019    Procedure: REDUCTION, NASAL TURBINATE;  Surgeon: Michael Grubbs MD;  Location: [facility] OR;  Service: ENT;  Laterality: Bilateral;    SINUS SURGERY      x4    TUBAL LIGATION      WRIST SURGERY Left      Family History   Problem Relation Age of Onset    Breast cancer Neg Hx      Social History     Tobacco Use    Smoking status: Former     Packs/day: 0.75     Years: 25.00     Pack years: 18.75     Types: Cigarettes     Quit date: 1999     Years since quittin.9    Smokeless tobacco: Never   Substance Use Topics    Alcohol use: Yes     Comment: social    Drug use: No     Review of Systems   Constitutional:  Positive for activity change, chills, fatigue and fever. Negative for appetite change.   HENT:  Positive for congestion, postnasal drip and sinus pressure. Negative for sore throat, trouble swallowing and voice change.    Eyes: Negative.    Respiratory:  Positive for cough. Negative for shortness of breath and wheezing.    Cardiovascular: Negative.    Gastrointestinal:  Negative.    Endocrine: Negative.    Genitourinary: Negative.    Musculoskeletal: Negative.    Skin: Negative.    Allergic/Immunologic: Negative for food allergies.   Neurological: Negative.    Hematological: Negative.    Psychiatric/Behavioral: Negative.     All other systems reviewed and are negative.    Physical Exam     Initial Vitals   BP Pulse Resp Temp SpO2   03/09/23 0938 03/09/23 0935 03/09/23 0935 03/09/23 0935 03/09/23 0935   (!) 141/74 75 20 100.1 °F (37.8 °C) 95 %      MAP       --                Physical Exam    Nursing note and vitals reviewed.  Constitutional: She appears well-developed and well-nourished. She is not diaphoretic. No distress.   HENT:   Head: Normocephalic and atraumatic.   Right Ear: Tympanic membrane normal.   Left Ear: Tympanic membrane normal.   Mouth/Throat: Oropharynx is clear and moist.   Eyes: Conjunctivae and EOM are normal. Pupils are equal, round, and reactive to light. No scleral icterus.   Neck: No thyromegaly present.   Normal range of motion.  Cardiovascular:  Normal rate, regular rhythm, normal heart sounds and intact distal pulses.     Exam reveals no friction rub.       No murmur heard.  Pulmonary/Chest: No respiratory distress. She has no wheezes. She has no rhonchi (Right lower lobe). She has rales. She exhibits no tenderness.   Abdominal: Abdomen is soft. Bowel sounds are normal. She exhibits no distension.   Musculoskeletal:         General: No edema. Normal range of motion.      Cervical back: Normal range of motion.     Lymphadenopathy:     She has no cervical adenopathy.   Neurological: She is alert and oriented to person, place, and time. She has normal strength. No sensory deficit. GCS score is 15. GCS eye subscore is 4. GCS verbal subscore is 5. GCS motor subscore is 6.   Skin: Skin is warm and dry. Capillary refill takes 2 to 3 seconds.   Psychiatric: She has a normal mood and affect.       ED Course   Procedures  Labs Reviewed   POCT GLUCOSE - Abnormal;  Notable for the following components:       Result Value    POCT Glucose 163 (*)     All other components within normal limits   INFLUENZA A & B BY MOLECULAR   SARS-COV-2 RNA AMPLIFICATION, QUAL    Narrative:     Is the patient symptomatic?->Yes          Imaging Results              X-Ray Chest PA And Lateral (Final result)  Result time 03/09/23 10:41:11      Final result by Raudel Humphrey MD (03/09/23 10:41:11)                   Impression:      No airspace disease to suggest pneumonia.      Electronically signed by: Raudel Humphrey  Date:    03/09/2023  Time:    10:41               Narrative:    EXAMINATION:  XR CHEST PA AND LATERAL    CLINICAL HISTORY:  Cough, unspecified    TECHNIQUE:  PA and lateral views of the chest were performed.    COMPARISON:  02/05/2019    FINDINGS:  Lungs are clear.Normal cardiomediastinal silhouette.Normal pulmonary vascular distribution.No pleural effusion or pneumothorax.No acute osseous abnormality.                                    X-Rays:   Independently Interpreted Readings:   Other Readings:  Chest x-ray    Chest x-ray reviewed by me shows no consolidations to suspect pneumonia, there is no signs of pleural fluid or pneumothorax.  Radiologist report reviewed, I agree, see findings below.      FINDINGS:  Lungs are clear.Normal cardiomediastinal silhouette.Normal pulmonary vascular distribution.No pleural effusion or pneumothorax.No acute osseous abnormality.     Impression:     No airspace disease to suggest pneumonia.        Electronically signed by: Raudel Humphrey  Date:                                            03/09/2023  Medications - No data to display  Medical Decision Making:   Initial Assessment:   Patient seen examined emergency room, no acute distress at this time.  Detailed assessment as noted above.  Differential Diagnosis:   Sinusitis, allergic rhinitis, COVID, flu, viral illness, pneumonia, pruritus  Clinical Tests:   Lab Tests: Ordered and Reviewed        <> Summary of Lab: COVID and flu negative.  Radiological Study: Ordered and Reviewed  ED Management:  Patient seen examined emergency room, Accu-Chek noted to be 163.  Will check patient for flu and COVID, as well as get a chest x-ray to rule out pneumonia.    Patient is negative for influenza and COVID.  Chest x-ray reviewed, shows no pneumonia, pneumothorax, pleural effusion.  Advised patient to continue using her Flonase twice a day, using her Claritin every afternoon.  Finish the antibiotics she was previously prescribed.  Will give the patient Mucinex to take twice a day, advised patient drink plenty of fluids.  Follow up with the primary care provider if no improvement 3-5 days.  Patient and family member in the room verbalized understanding drink plenty.                        Clinical Impression:   Final diagnoses:  [R05.9] Cough  [J06.9] Upper respiratory tract infection, unspecified type (Primary)        ED Disposition Condition    Discharge Stable          ED Prescriptions       Medication Sig Dispense Start Date End Date Auth. Provider    guaiFENesin (MUCINEX) 600 mg 12 hr tablet Take 1 tablet (600 mg total) by mouth 2 (two) times daily. 30 tablet 3/9/2023 -- Jerardo Fox NP          Follow-up Information    None          Jerardo Fox NP  03/09/23 4118

## 2023-03-09 NOTE — DISCHARGE INSTRUCTIONS
Continue taking all prescribed medications as directed from primary care team and last ER visit.    Use Mucinex twice a day, be sure to drink plenty of fluids stay hydrated.    Follow-up with primary care provider in 3-5 days if no improvement.

## 2023-04-17 ENCOUNTER — HOSPITAL ENCOUNTER (EMERGENCY)
Facility: HOSPITAL | Age: 73
Discharge: HOME | End: 2023-04-17
Payer: MEDICARE

## 2023-04-17 VITALS
OXYGEN SATURATION: 99 % | WEIGHT: 175 LBS | DIASTOLIC BLOOD PRESSURE: 80 MMHG | HEIGHT: 62 IN | RESPIRATION RATE: 18 BRPM | SYSTOLIC BLOOD PRESSURE: 140 MMHG | TEMPERATURE: 98 F | HEART RATE: 78 BPM | BODY MASS INDEX: 32.2 KG/M2

## 2023-04-17 DIAGNOSIS — Z86.79 HISTORY OF CHRONIC HYPERTENSION: ICD-10-CM

## 2023-04-17 DIAGNOSIS — J32.9 SINUSITIS, UNSPECIFIED CHRONICITY, UNSPECIFIED LOCATION: Primary | ICD-10-CM

## 2023-04-17 DIAGNOSIS — J30.9 ALLERGIC RHINITIS, UNSPECIFIED SEASONALITY, UNSPECIFIED TRIGGER: ICD-10-CM

## 2023-04-17 PROCEDURE — 99284 EMERGENCY DEPT VISIT MOD MDM: CPT

## 2023-04-17 RX ORDER — CEFUROXIME AXETIL 500 MG/1
500 TABLET ORAL EVERY 12 HOURS
Qty: 14 TABLET | Refills: 0 | Status: SHIPPED | OUTPATIENT
Start: 2023-04-17 | End: 2023-06-05

## 2023-04-17 RX ORDER — CETIRIZINE HYDROCHLORIDE 10 MG/1
10 TABLET ORAL DAILY
Qty: 30 TABLET | Refills: 1 | Status: SHIPPED | OUTPATIENT
Start: 2023-04-17 | End: 2023-06-05 | Stop reason: SDUPTHER

## 2023-04-17 RX ORDER — FLUTICASONE PROPIONATE 50 MCG
1 SPRAY, SUSPENSION (ML) NASAL DAILY
Qty: 10 ML | Refills: 1 | Status: SHIPPED | OUTPATIENT
Start: 2023-04-17 | End: 2023-06-05

## 2023-04-17 NOTE — DISCHARGE INSTRUCTIONS
Continue taking all previous prescribed medications as directed by primary care team.    Take new medicines as prescribed, be sure to finish p.o. antibiotics.    Follow up with the primary care provider's as appointment can be made or follow-up with Eliza Ware NP within the next 7 days.    Return emergency room if symptoms worsen you develop any new other worrisome symptom.

## 2023-04-17 NOTE — ED PROVIDER NOTES
Encounter Date: 4/17/2023       History     Chief Complaint   Patient presents with    Nasal Congestion     Pt states she was diagnosed with a sinus infection a month ago. Symptoms have been intermittent. For the last 3 days she has been experiencing excessive sinus drainage and pressure. She states that she has an ENT appt but cannot be seen until May.     Patient is 72 year old female presents emergency room with yellow sinus drainage.  Patient states she was treated for sinus infection proximally 1 month ago, has run out of her Flonase and Zyrtec.  Patient states she does not have any money to buy over-the-counter medicines to treat her allergies.  Patient states she is not seen a primary care provider because her insurance changed in her doctor's on take her insurance anymore.  She denies any fevers chills, nausea, vomiting, diarrhea, chest pain shortness of breath.  Patient states yellow sinus drainage has returned has progressively gotten worse over the past 3-4 days.      Review of patient's allergies indicates:   Allergen Reactions    Ketorolac tromethamine Other (See Comments)     Other reaction(s): Burning    Moxifloxacin Other (See Comments)     Heart racing.    Codeine     Prevacid [lansoprazole] Itching     Past Medical History:   Diagnosis Date    Anxiety     Arthritis     Chronic sinusitis     Diabetes mellitus     Hypertension      Past Surgical History:   Procedure Laterality Date    BREAST BIOPSY      BREAST LUMPECTOMY Left     BUNIONECTOMY Bilateral     COLONOSCOPY      ESOPHAGOGASTRODUODENOSCOPY      ETHMOIDECTOMY Bilateral 2/7/2019    Procedure: ETHMOIDECTOMY;  Surgeon: Michael Grubbs MD;  Location: [facility] OR;  Service: ENT;  Laterality: Bilateral;  endoscopic anterior and posterior    FUNCTIONAL ENDOSCOPIC SINUS SURGERY (FESS) N/A 2/7/2019    Procedure: FESS (FUNCTIONAL ENDOSCOPIC SINUS SURGERY);  Surgeon: Michael Grubbs MD;  Location: [facility] OR;  Service: ENT;  Laterality: N/A;     FUNCTIONAL ENDOSCOPIC SINUS SURGERY (FESS) Bilateral 2019    Procedure: FESS (FUNCTIONAL ENDOSCOPIC SINUS SURGERY);  Surgeon: Michael Grubbs MD;  Location: [facility] OR;  Service: ENT;  Laterality: Bilateral;    MAXILLARY ANTROSTOMY Bilateral 2019    Procedure: MAXILLARY ANTROSTOMY;  Surgeon: Michael Grubbs MD;  Location: [facility] OR;  Service: ENT;  Laterality: Bilateral;    NASAL SEPTOPLASTY Bilateral 2019    Procedure: SEPTOPLASTY, NOSE;  Surgeon: Michael Grubbs MD;  Location: [facility] OR;  Service: ENT;  Laterality: Bilateral;    NASAL TURBINATE REDUCTION Bilateral 2019    Procedure: REDUCTION, NASAL TURBINATE;  Surgeon: Michael Grubbs MD;  Location: [facility] OR;  Service: ENT;  Laterality: Bilateral;    SINUS SURGERY      x4    TUBAL LIGATION      WRIST SURGERY Left      Family History   Problem Relation Age of Onset    Breast cancer Neg Hx      Social History     Tobacco Use    Smoking status: Former     Packs/day: 0.75     Years: 25.00     Pack years: 18.75     Types: Cigarettes     Quit date: 1999     Years since quittin.0    Smokeless tobacco: Never   Substance Use Topics    Alcohol use: Yes     Comment: social    Drug use: No     Review of Systems   Constitutional: Negative.    HENT:  Positive for congestion, postnasal drip and sinus pressure. Negative for sore throat.    Eyes: Negative.    Respiratory: Negative.     Cardiovascular: Negative.    Gastrointestinal: Negative.    Endocrine: Negative.    Genitourinary: Negative.    Musculoskeletal: Negative.    Skin: Negative.    Allergic/Immunologic: Negative for food allergies.   Hematological: Negative.    Psychiatric/Behavioral: Negative.     All other systems reviewed and are negative.    Physical Exam     Initial Vitals [23 1551]   BP Pulse Resp Temp SpO2   (!) 146/78 72 18 98.6 °F (37 °C) 98 %      MAP       --         Physical Exam    Nursing note and vitals reviewed.  Constitutional: She appears well-developed and  well-nourished. She is not diaphoretic. No distress.   HENT:   Head: Normocephalic and atraumatic.   Mouth/Throat: Oropharynx is clear and moist.   Eyes: Conjunctivae and EOM are normal. No scleral icterus.   Neck: Neck supple.   Normal range of motion.  Cardiovascular:  Regular rhythm and intact distal pulses.           No murmur heard.  Pulmonary/Chest: Breath sounds normal. No respiratory distress. She has no wheezes. She has no rhonchi. She has no rales.   Musculoskeletal:         General: No edema. Normal range of motion.      Cervical back: Normal range of motion and neck supple.     Lymphadenopathy:     She has no cervical adenopathy.   Neurological: She is alert and oriented to person, place, and time. She has normal strength. No sensory deficit.   Skin: Skin is warm and dry. Capillary refill takes 2 to 3 seconds.   Psychiatric: She has a normal mood and affect.       ED Course   Procedures  Labs Reviewed - No data to display       Imaging Results    None          Medications - No data to display  Medical Decision Making:   Initial Assessment:   Patient seen examined emergency room, no acute distress noted at this time.  Patient does have some mild yellow postnasal drip noted in the back of her throat.  Further detailed assessment as noted above.  Differential Diagnosis:   Flu, COVID, strep, sinusitis, allergic rhinitis, postnasal drip  ED Management:  After patient was seen examined emergency room, will renew patient's Flonase, Zyrtec her allergic rhinitis.  Will place patient on Ceftin for sinusitis.  Advised patient to follow up with the primary care provider soon as appointment can be made, referral has been made to try to get her reestablished.  Patient verbalized understanding treatment plan.                        Clinical Impression:   Final diagnoses:  [J32.9] Sinusitis, unspecified chronicity, unspecified location (Primary)  [J30.9] Allergic rhinitis, unspecified seasonality, unspecified  trigger  [Z86.79] History of chronic hypertension        ED Disposition Condition    Discharge Stable          ED Prescriptions       Medication Sig Dispense Start Date End Date Auth. Provider    fluticasone propionate (FLONASE) 50 mcg/actuation nasal spray 1 spray (50 mcg total) by Each Nostril route once daily. 10 mL 4/17/2023 -- Jerardo Fox NP    cetirizine (ZYRTEC) 10 MG tablet Take 1 tablet (10 mg total) by mouth once daily. 30 tablet 4/17/2023 4/16/2024 Jerardo Fox NP    cefUROXime (CEFTIN) 500 MG tablet Take 1 tablet (500 mg total) by mouth every 12 (twelve) hours. 14 tablet 4/17/2023 -- Jerardo Fox NP          Follow-up Information       Follow up With Specialties Details Why Contact Info    Uzair Ware NP Family Medicine In 1 week If symptoms worsen, As needed [address]  [place] [address]  [phone]               Jerardo Fox NP  04/17/23 6040

## 2023-05-24 ENCOUNTER — TELEPHONE (OUTPATIENT)
Dept: PAIN MEDICINE | Facility: CLINIC | Age: 73
End: 2023-05-24
Payer: MEDICARE

## 2023-05-24 NOTE — TELEPHONE ENCOUNTER
----- Message from Radha Forbes sent at 5/24/2023  9:08 AM CDT -----  Regarding: Needs to schedule  Type: Needs Medical Advice  Who Called:  Ivana    Joe Call Back Number: [phone]    Additional Information: Pt called to schedule with the pain management clinic in [facility] for her back pain, please call to advise.

## 2023-05-31 ENCOUNTER — TELEPHONE (OUTPATIENT)
Dept: FAMILY MEDICINE | Facility: CLINIC | Age: 73
End: 2023-05-31
Payer: MEDICARE

## 2023-05-31 DIAGNOSIS — Z78.0 ENCOUNTER FOR OSTEOPOROSIS SCREENING IN ASYMPTOMATIC POSTMENOPAUSAL PATIENT: ICD-10-CM

## 2023-05-31 DIAGNOSIS — Z12.31 BREAST CANCER SCREENING BY MAMMOGRAM: Primary | ICD-10-CM

## 2023-05-31 DIAGNOSIS — Z13.820 ENCOUNTER FOR OSTEOPOROSIS SCREENING IN ASYMPTOMATIC POSTMENOPAUSAL PATIENT: ICD-10-CM

## 2023-06-05 ENCOUNTER — OFFICE VISIT (OUTPATIENT)
Dept: FAMILY MEDICINE | Facility: CLINIC | Age: 73
End: 2023-06-05
Payer: MEDICARE

## 2023-06-05 ENCOUNTER — HOSPITAL ENCOUNTER (EMERGENCY)
Facility: HOSPITAL | Age: 73
Discharge: HOME | End: 2023-06-06
Attending: EMERGENCY MEDICINE
Payer: MEDICARE

## 2023-06-05 VITALS
SYSTOLIC BLOOD PRESSURE: 132 MMHG | WEIGHT: 177 LBS | DIASTOLIC BLOOD PRESSURE: 76 MMHG | BODY MASS INDEX: 32.57 KG/M2 | HEIGHT: 62 IN | OXYGEN SATURATION: 98 % | HEART RATE: 79 BPM | TEMPERATURE: 99 F | RESPIRATION RATE: 18 BRPM

## 2023-06-05 VITALS
HEIGHT: 62 IN | WEIGHT: 177.19 LBS | RESPIRATION RATE: 18 BRPM | BODY MASS INDEX: 32.61 KG/M2 | DIASTOLIC BLOOD PRESSURE: 67 MMHG | OXYGEN SATURATION: 97 % | HEART RATE: 57 BPM | TEMPERATURE: 98 F | SYSTOLIC BLOOD PRESSURE: 138 MMHG

## 2023-06-05 DIAGNOSIS — Z79.899 HIGH RISK MEDICATION USE: ICD-10-CM

## 2023-06-05 DIAGNOSIS — F51.01 PRIMARY INSOMNIA: ICD-10-CM

## 2023-06-05 DIAGNOSIS — Z76.89 ENCOUNTER TO ESTABLISH CARE: Primary | ICD-10-CM

## 2023-06-05 DIAGNOSIS — E55.9 VITAMIN D DEFICIENCY: ICD-10-CM

## 2023-06-05 DIAGNOSIS — R06.2 WHEEZES: ICD-10-CM

## 2023-06-05 DIAGNOSIS — E78.5 TYPE 2 DIABETES MELLITUS WITH HYPERLIPIDEMIA: ICD-10-CM

## 2023-06-05 DIAGNOSIS — E11.69 TYPE 2 DIABETES MELLITUS WITH HYPERLIPIDEMIA: ICD-10-CM

## 2023-06-05 DIAGNOSIS — J30.2 SEASONAL ALLERGIES: ICD-10-CM

## 2023-06-05 DIAGNOSIS — F32.A ANXIETY AND DEPRESSION: ICD-10-CM

## 2023-06-05 DIAGNOSIS — M79.641 RIGHT HAND PAIN: ICD-10-CM

## 2023-06-05 DIAGNOSIS — J01.40 SUBACUTE PANSINUSITIS: ICD-10-CM

## 2023-06-05 DIAGNOSIS — M79.2 NERVE PAIN: ICD-10-CM

## 2023-06-05 DIAGNOSIS — T78.40XA ALLERGIC REACTION, INITIAL ENCOUNTER: Primary | ICD-10-CM

## 2023-06-05 DIAGNOSIS — I10 ESSENTIAL HYPERTENSION: ICD-10-CM

## 2023-06-05 DIAGNOSIS — F41.9 ANXIETY AND DEPRESSION: ICD-10-CM

## 2023-06-05 PROCEDURE — 1160F PR REVIEW ALL MEDS BY PRESCRIBER/CLIN PHARMACIST DOCUMENTED: ICD-10-PCS | Mod: CPTII,S$GLB,, | Performed by: NURSE PRACTITIONER

## 2023-06-05 PROCEDURE — 3008F PR BODY MASS INDEX (BMI) DOCUMENTED: ICD-10-PCS | Mod: CPTII,S$GLB,, | Performed by: NURSE PRACTITIONER

## 2023-06-05 PROCEDURE — 3288F PR FALLS RISK ASSESSMENT DOCUMENTED: ICD-10-PCS | Mod: CPTII,S$GLB,, | Performed by: NURSE PRACTITIONER

## 2023-06-05 PROCEDURE — 63600175 PHARM REV CODE 636 W HCPCS: Performed by: EMERGENCY MEDICINE

## 2023-06-05 PROCEDURE — 3075F SYST BP GE 130 - 139MM HG: CPT | Mod: CPTII,S$GLB,, | Performed by: NURSE PRACTITIONER

## 2023-06-05 PROCEDURE — 99204 OFFICE O/P NEW MOD 45 MIN: CPT | Mod: S$GLB,,, | Performed by: NURSE PRACTITIONER

## 2023-06-05 PROCEDURE — 99284 EMERGENCY DEPT VISIT MOD MDM: CPT

## 2023-06-05 PROCEDURE — 99999 PR PBB SHADOW E&M-EST. PATIENT-LVL V: ICD-10-PCS | Mod: PBBFAC,,, | Performed by: NURSE PRACTITIONER

## 2023-06-05 PROCEDURE — 1157F ADVNC CARE PLAN IN RCRD: CPT | Mod: CPTII,S$GLB,, | Performed by: NURSE PRACTITIONER

## 2023-06-05 PROCEDURE — 3008F BODY MASS INDEX DOCD: CPT | Mod: CPTII,S$GLB,, | Performed by: NURSE PRACTITIONER

## 2023-06-05 PROCEDURE — 96372 THER/PROPH/DIAG INJ SC/IM: CPT | Performed by: EMERGENCY MEDICINE

## 2023-06-05 PROCEDURE — 99204 PR OFFICE/OUTPT VISIT, NEW, LEVL IV, 45-59 MIN: ICD-10-PCS | Mod: S$GLB,,, | Performed by: NURSE PRACTITIONER

## 2023-06-05 PROCEDURE — 1160F RVW MEDS BY RX/DR IN RCRD: CPT | Mod: CPTII,S$GLB,, | Performed by: NURSE PRACTITIONER

## 2023-06-05 PROCEDURE — 1101F PT FALLS ASSESS-DOCD LE1/YR: CPT | Mod: CPTII,S$GLB,, | Performed by: NURSE PRACTITIONER

## 2023-06-05 PROCEDURE — 1159F MED LIST DOCD IN RCRD: CPT | Mod: CPTII,S$GLB,, | Performed by: NURSE PRACTITIONER

## 2023-06-05 PROCEDURE — 1125F PR PAIN SEVERITY QUANTIFIED, PAIN PRESENT: ICD-10-PCS | Mod: CPTII,S$GLB,, | Performed by: NURSE PRACTITIONER

## 2023-06-05 PROCEDURE — 3078F DIAST BP <80 MM HG: CPT | Mod: CPTII,S$GLB,, | Performed by: NURSE PRACTITIONER

## 2023-06-05 PROCEDURE — 1101F PR PT FALLS ASSESS DOC 0-1 FALLS W/OUT INJ PAST YR: ICD-10-PCS | Mod: CPTII,S$GLB,, | Performed by: NURSE PRACTITIONER

## 2023-06-05 PROCEDURE — 3075F PR MOST RECENT SYSTOLIC BLOOD PRESS GE 130-139MM HG: ICD-10-PCS | Mod: CPTII,S$GLB,, | Performed by: NURSE PRACTITIONER

## 2023-06-05 PROCEDURE — 3288F FALL RISK ASSESSMENT DOCD: CPT | Mod: CPTII,S$GLB,, | Performed by: NURSE PRACTITIONER

## 2023-06-05 PROCEDURE — 99999 PR PBB SHADOW E&M-EST. PATIENT-LVL V: CPT | Mod: PBBFAC,,, | Performed by: NURSE PRACTITIONER

## 2023-06-05 PROCEDURE — 1125F AMNT PAIN NOTED PAIN PRSNT: CPT | Mod: CPTII,S$GLB,, | Performed by: NURSE PRACTITIONER

## 2023-06-05 PROCEDURE — 3078F PR MOST RECENT DIASTOLIC BLOOD PRESSURE < 80 MM HG: ICD-10-PCS | Mod: CPTII,S$GLB,, | Performed by: NURSE PRACTITIONER

## 2023-06-05 PROCEDURE — 1159F PR MEDICATION LIST DOCUMENTED IN MEDICAL RECORD: ICD-10-PCS | Mod: CPTII,S$GLB,, | Performed by: NURSE PRACTITIONER

## 2023-06-05 PROCEDURE — 1157F PR ADVANCE CARE PLAN OR EQUIV PRESENT IN MEDICAL RECORD: ICD-10-PCS | Mod: CPTII,S$GLB,, | Performed by: NURSE PRACTITIONER

## 2023-06-05 RX ORDER — TRAZODONE HYDROCHLORIDE 50 MG/1
50 TABLET ORAL NIGHTLY PRN
Qty: 90 TABLET | Refills: 1 | Status: SHIPPED | OUTPATIENT
Start: 2023-06-05 | End: 2023-11-14 | Stop reason: SDUPTHER

## 2023-06-05 RX ORDER — CETIRIZINE HYDROCHLORIDE 10 MG/1
10 TABLET ORAL DAILY PRN
Qty: 90 TABLET | Refills: 1 | Status: SHIPPED | OUTPATIENT
Start: 2023-06-05 | End: 2023-11-14 | Stop reason: SDUPTHER

## 2023-06-05 RX ORDER — CETIRIZINE HYDROCHLORIDE 10 MG/1
10 TABLET ORAL DAILY PRN
Qty: 90 TABLET | Refills: 1 | Status: SHIPPED | OUTPATIENT
Start: 2023-06-05 | End: 2023-06-05 | Stop reason: SDUPTHER

## 2023-06-05 RX ORDER — ESCITALOPRAM OXALATE 5 MG/1
5 TABLET ORAL DAILY
Qty: 90 TABLET | Refills: 1 | Status: SHIPPED | OUTPATIENT
Start: 2023-06-05 | End: 2023-08-14

## 2023-06-05 RX ORDER — ALBUTEROL SULFATE 90 UG/1
2 AEROSOL, METERED RESPIRATORY (INHALATION) EVERY 4 HOURS PRN
Qty: 18 G | Refills: 11 | Status: SHIPPED | OUTPATIENT
Start: 2023-06-05 | End: 2023-11-14 | Stop reason: SDUPTHER

## 2023-06-05 RX ORDER — CEFUROXIME AXETIL 500 MG/1
500 TABLET ORAL 2 TIMES DAILY
Qty: 20 TABLET | Refills: 0 | Status: SHIPPED | OUTPATIENT
Start: 2023-06-05 | End: 2023-06-07

## 2023-06-05 RX ORDER — FLUTICASONE PROPIONATE 50 MCG
2 SPRAY, SUSPENSION (ML) NASAL DAILY PRN
Qty: 15.8 ML | Refills: 11 | Status: SHIPPED | OUTPATIENT
Start: 2023-06-05 | End: 2023-11-14 | Stop reason: SDUPTHER

## 2023-06-05 RX ORDER — METOPROLOL TARTRATE 50 MG/1
50 TABLET ORAL 2 TIMES DAILY
Qty: 180 TABLET | Refills: 1 | Status: SHIPPED | OUTPATIENT
Start: 2023-06-05 | End: 2023-11-14 | Stop reason: SDUPTHER

## 2023-06-05 RX ORDER — DIPHENHYDRAMINE HYDROCHLORIDE 50 MG/ML
50 INJECTION INTRAMUSCULAR; INTRAVENOUS
Status: COMPLETED | OUTPATIENT
Start: 2023-06-05 | End: 2023-06-05

## 2023-06-05 RX ORDER — AZELASTINE 1 MG/ML
1 SPRAY, METERED NASAL 2 TIMES DAILY PRN
Qty: 30 ML | Refills: 11 | Status: SHIPPED | OUTPATIENT
Start: 2023-06-05 | End: 2023-11-14 | Stop reason: SDUPTHER

## 2023-06-05 RX ORDER — LOSARTAN POTASSIUM AND HYDROCHLOROTHIAZIDE 25; 100 MG/1; MG/1
1 TABLET ORAL EVERY MORNING
Qty: 90 TABLET | Refills: 1 | Status: SHIPPED | OUTPATIENT
Start: 2023-06-05 | End: 2023-11-14 | Stop reason: SDUPTHER

## 2023-06-05 RX ORDER — PREGABALIN 75 MG/1
75 CAPSULE ORAL 2 TIMES DAILY
Qty: 180 CAPSULE | Refills: 1 | Status: SHIPPED | OUTPATIENT
Start: 2023-06-05 | End: 2023-11-14 | Stop reason: SDUPTHER

## 2023-06-05 RX ORDER — AMLODIPINE BESYLATE 10 MG/1
10 TABLET ORAL NIGHTLY
Qty: 90 TABLET | Refills: 1 | Status: SHIPPED | OUTPATIENT
Start: 2023-06-05 | End: 2024-04-01

## 2023-06-05 RX ORDER — METHYLPREDNISOLONE SOD SUCC 125 MG
125 VIAL (EA) INJECTION
Status: COMPLETED | OUTPATIENT
Start: 2023-06-05 | End: 2023-06-05

## 2023-06-05 RX ORDER — SIMVASTATIN 20 MG/1
20 TABLET, FILM COATED ORAL NIGHTLY
Qty: 90 TABLET | Refills: 1 | Status: SHIPPED | OUTPATIENT
Start: 2023-06-05 | End: 2023-11-14 | Stop reason: SDUPTHER

## 2023-06-05 RX ADMIN — DIPHENHYDRAMINE HYDROCHLORIDE 50 MG: 50 INJECTION INTRAMUSCULAR; INTRAVENOUS at 11:06

## 2023-06-05 RX ADMIN — METHYLPREDNISOLONE SODIUM SUCCINATE 125 MG: 125 INJECTION, POWDER, FOR SOLUTION INTRAMUSCULAR; INTRAVENOUS at 11:06

## 2023-06-05 NOTE — PROGRESS NOTES
Subjective:       Patient ID: Argelia Pyle is a 72 y.o. female.    Chief Complaint: Establish Care  -  The patient is a 71 y/o female that presents to [facility] care. Previous request to order mammo was entered but not done yet and no appt.    She presents with her [facility] as a medication list vs bringing bottles, poor historian thus took 30 min to try to figure out what she is really taking.     C/o right hand pain > 1 year previously seen by Dr. Oliver with neg xray yet h/o inj. MD no longer takes her ins thus request referral to out side facility. Had unknown sx on left hand, presumed trigger finger with same c/o for right hand.    C/o chronic seasonal allergies and h/o benign under the care of ENT. Today reports yellow/green nasal phlegm x 7 days without relief from OTC aids requesting Abx.    Last A1c 6.6 yet not sure what she is taking. HTN controlled. Will repeat lipid panel d/t denies taking fenofibrate yet was on MAR. Will discuss DEXA at next visit.   -    Past Medical History:   Diagnosis Date    Anxiety     Arthritis     Chronic sinusitis     Diabetes mellitus     Hypertension        Past Surgical History:   Procedure Laterality Date    BREAST BIOPSY      BREAST LUMPECTOMY Left     BUNIONECTOMY Bilateral     COLONOSCOPY      ESOPHAGOGASTRODUODENOSCOPY      ETHMOIDECTOMY Bilateral 2/7/2019    Procedure: ETHMOIDECTOMY;  Surgeon: Michael Grubbs MD;  Location: [facility] OR;  Service: ENT;  Laterality: Bilateral;  endoscopic anterior and posterior    FUNCTIONAL ENDOSCOPIC SINUS SURGERY (FESS) N/A 2/7/2019    Procedure: FESS (FUNCTIONAL ENDOSCOPIC SINUS SURGERY);  Surgeon: Michael Grubbs MD;  Location: [facility] OR;  Service: ENT;  Laterality: N/A;    FUNCTIONAL ENDOSCOPIC SINUS SURGERY (FESS) Bilateral 12/19/2019    Procedure: FESS (FUNCTIONAL ENDOSCOPIC SINUS SURGERY);  Surgeon: Michael Grubbs MD;  Location: [facility] OR;  Service: ENT;  Laterality: Bilateral;    MAXILLARY ANTROSTOMY Bilateral  2019    Procedure: MAXILLARY ANTROSTOMY;  Surgeon: Michael Grubbs MD;  Location: [facility] OR;  Service: ENT;  Laterality: Bilateral;    NASAL SEPTOPLASTY Bilateral 2019    Procedure: SEPTOPLASTY, NOSE;  Surgeon: Michael Grubbs MD;  Location: [facility] OR;  Service: ENT;  Laterality: Bilateral;    NASAL TURBINATE REDUCTION Bilateral 2019    Procedure: REDUCTION, NASAL TURBINATE;  Surgeon: Michael Grubbs MD;  Location: [facility] OR;  Service: ENT;  Laterality: Bilateral;    SINUS SURGERY      x4    TUBAL LIGATION      WRIST SURGERY Left         Social History     Socioeconomic History    Marital status:    Tobacco Use    Smoking status: Former     Packs/day: 0.75     Years: 25.00     Pack years: 18.75     Types: Cigarettes     Quit date: 1999     Years since quittin.1    Smokeless tobacco: Never   Substance and Sexual Activity    Alcohol use: Yes     Comment: social    Drug use: No    Sexual activity: Not Currently     Birth control/protection: See Surgical Hx       Family History   Problem Relation Age of Onset    Breast cancer Neg Hx        Review of patient's allergies indicates:   Allergen Reactions    Ketorolac tromethamine Other (See Comments)     Other reaction(s): Burning    Moxifloxacin Other (See Comments)     Heart racing.    Codeine Itching    Prevacid [lansoprazole] Itching          Current Outpatient Medications:     aspirin (ECOTRIN) 81 MG EC tablet, Take 81 mg by mouth every evening., Disp: , Rfl:     famotidine (PEPCID) 20 MG tablet, Take 20 mg by mouth daily as needed., Disp: , Rfl:     metFORMIN (GLUCOPHAGE) 1000 MG tablet, Take 1,000 mg by mouth 2 (two) times daily., Disp: , Rfl:     albuterol (PROVENTIL/VENTOLIN HFA) 90 mcg/actuation inhaler, Inhale 2 puffs into the lungs every 4 (four) hours as needed for Wheezing or Shortness of Breath., Disp: 18 g, Rfl: 11    amLODIPine (NORVASC) 10 MG tablet, Take 1 tablet (10 mg total) by mouth every evening., Disp: 90 tablet, Rfl:  1    azelastine (ASTELIN) 137 mcg (0.1 %) nasal spray, 1 spray (137 mcg total) by Nasal route 2 (two) times daily as needed for Rhinitis., Disp: 30 mL, Rfl: 11    cefUROXime (CEFTIN) 500 MG tablet, Take 1 tablet (500 mg total) by mouth 2 (two) times daily. for 10 days, Disp: 20 tablet, Rfl: 0    cetirizine (ZYRTEC) 10 MG tablet, Take 1 tablet (10 mg total) by mouth daily as needed for Allergies., Disp: 90 tablet, Rfl: 1    empagliflozin (JARDIANCE) 10 mg tablet, Take 1 tablet (10 mg total) by mouth every morning., Disp: 90 tablet, Rfl: 1    EScitalopram oxalate (LEXAPRO) 5 MG Tab, Take 1 tablet (5 mg total) by mouth once daily., Disp: 90 tablet, Rfl: 1    fluticasone propionate (FLONASE) 50 mcg/actuation nasal spray, 2 sprays (100 mcg total) by Each Nostril route daily as needed for Rhinitis., Disp: 15.8 mL, Rfl: 11    losartan-hydrochlorothiazide 100-25 mg (HYZAAR) 100-25 mg per tablet, Take 1 tablet by mouth every morning., Disp: 90 tablet, Rfl: 1    metoprolol tartrate (LOPRESSOR) 50 MG tablet, Take 1 tablet (50 mg total) by mouth 2 (two) times daily., Disp: 180 tablet, Rfl: 1    pregabalin (LYRICA) 75 MG capsule, Take 1 capsule (75 mg total) by mouth 2 (two) times daily., Disp: 180 capsule, Rfl: 1    simvastatin (ZOCOR) 20 MG tablet, Take 1 tablet (20 mg total) by mouth nightly., Disp: 90 tablet, Rfl: 1    traZODone (DESYREL) 50 MG tablet, Take 1 tablet (50 mg total) by mouth nightly as needed for Insomnia., Disp: 90 tablet, Rfl: 1    HPI  Review of Systems   Constitutional: Negative.    HENT:  Positive for congestion, postnasal drip, rhinorrhea, sinus pressure and sinus pain.    Eyes: Negative.    Respiratory: Negative.     Cardiovascular: Negative.    Gastrointestinal: Negative.    Endocrine: Negative.    Genitourinary: Negative.    Musculoskeletal:         Right hand pain   Skin: Negative.    Allergic/Immunologic: Negative.    Neurological: Negative.    Hematological: Negative.    Psychiatric/Behavioral:  Negative.       Objective:      Physical Exam  Vitals and nursing note reviewed.   Constitutional:       General: She is not in acute distress.     Appearance: Normal appearance. She is well-developed. She is obese. She is not ill-appearing.   HENT:      Head: Normocephalic.      Right Ear: Hearing, ear canal and external ear normal. A middle ear effusion is present.      Left Ear: Hearing, ear canal and external ear normal. A middle ear effusion is present.      Nose: Nasal tenderness, mucosal edema and rhinorrhea present. Rhinorrhea is purulent.      Right Turbinates: Enlarged and swollen.      Left Turbinates: Enlarged and swollen.      Right Sinus: Maxillary sinus tenderness and frontal sinus tenderness present.      Left Sinus: Maxillary sinus tenderness and frontal sinus tenderness present.      Mouth/Throat:      Lips: Pink.      Mouth: Mucous membranes are moist.      Pharynx: Oropharynx is clear.   Eyes:      Conjunctiva/sclera: Conjunctivae normal.   Neck:      Thyroid: No thyromegaly.   Cardiovascular:      Rate and Rhythm: Normal rate and regular rhythm.      Heart sounds: Normal heart sounds. No murmur heard.  Pulmonary:      Effort: Pulmonary effort is normal.      Breath sounds: Normal breath sounds. No wheezing or rales.   Musculoskeletal:         General: Normal range of motion.      Cervical back: Normal range of motion.      Right lower leg: No edema.      Left lower leg: No edema.      Comments: No general abnormalities of right hand noted yet demonstrates how middle finger sticks in downward position.   Skin:     General: Skin is warm and dry.   Neurological:      Mental Status: She is alert and oriented to person, place, and time. Mental status is at baseline.   Psychiatric:         Mood and Affect: Mood normal.         Behavior: Behavior normal.         Thought Content: Thought content normal.         Judgment: Judgment normal.       Assessment:       1. Encounter to establish care    2.  Primary insomnia    3. Seasonal allergies    4. Type 2 diabetes mellitus with hyperlipidemia    5. Essential hypertension    6. Anxiety and depression    7. Nerve pain    8. Wheezes    9. Right hand pain    10. High risk medication use    11. Vitamin D deficiency    12. Subacute pansinusitis        Plan:     1- mammo needs scheduled.  2- Refer to Ortho. Seen by Dr. Oliver before yet can not wait 3 mo for appt. Does not take ins thus referred to [facility] per pt request  3- fasting labs in am  4- 4 week follow up.----pt given 8 week f/u?  5- Patient to bring all medication bottles to next visit.  6- ceftin for sinusitis  7- all meds filled x 6 mo  ---------------  AVS printed to provide new Med list.  -      Encounter to establish care    Primary insomnia  -     traZODone (DESYREL) 50 MG tablet; Take 1 tablet (50 mg total) by mouth nightly as needed for Insomnia.  Dispense: 90 tablet; Refill: 1    Seasonal allergies  -     cetirizine (ZYRTEC) 10 MG tablet; Take 1 tablet (10 mg total) by mouth daily as needed for Allergies.  Dispense: 90 tablet; Refill: 1  -     fluticasone propionate (FLONASE) 50 mcg/actuation nasal spray; 2 sprays (100 mcg total) by Each Nostril route daily as needed for Rhinitis.  Dispense: 15.8 mL; Refill: 11  -     azelastine (ASTELIN) 137 mcg (0.1 %) nasal spray; 1 spray (137 mcg total) by Nasal route 2 (two) times daily as needed for Rhinitis.  Dispense: 30 mL; Refill: 11    Type 2 diabetes mellitus with hyperlipidemia  -     simvastatin (ZOCOR) 20 MG tablet; Take 1 tablet (20 mg total) by mouth nightly.  Dispense: 90 tablet; Refill: 1  -     empagliflozin (JARDIANCE) 10 mg tablet; Take 1 tablet (10 mg total) by mouth every morning.  Dispense: 90 tablet; Refill: 1  -     Lipid Panel; Future; Expected date: 06/05/2023  -     CBC Auto Differential; Future; Expected date: 06/05/2023  -     Comprehensive Metabolic Panel; Future; Expected date: 06/05/2023  -     TSH; Future; Expected date:  06/05/2023  -     T4, Free; Future; Expected date: 06/05/2023  -     Hemoglobin A1C; Future; Expected date: 06/05/2023    Essential hypertension  -     metoprolol tartrate (LOPRESSOR) 50 MG tablet; Take 1 tablet (50 mg total) by mouth 2 (two) times daily.  Dispense: 180 tablet; Refill: 1  -     losartan-hydrochlorothiazide 100-25 mg (HYZAAR) 100-25 mg per tablet; Take 1 tablet by mouth every morning.  Dispense: 90 tablet; Refill: 1  -     amLODIPine (NORVASC) 10 MG tablet; Take 1 tablet (10 mg total) by mouth every evening.  Dispense: 90 tablet; Refill: 1  -     Lipid Panel; Future; Expected date: 06/05/2023  -     CBC Auto Differential; Future; Expected date: 06/05/2023  -     Comprehensive Metabolic Panel; Future; Expected date: 06/05/2023  -     TSH; Future; Expected date: 06/05/2023  -     T4, Free; Future; Expected date: 06/05/2023  -     Hemoglobin A1C; Future; Expected date: 06/05/2023    Anxiety and depression  -     EScitalopram oxalate (LEXAPRO) 5 MG Tab; Take 1 tablet (5 mg total) by mouth once daily.  Dispense: 90 tablet; Refill: 1    Nerve pain  -     pregabalin (LYRICA) 75 MG capsule; Take 1 capsule (75 mg total) by mouth 2 (two) times daily.  Dispense: 180 capsule; Refill: 1    Wheezes  -     albuterol (PROVENTIL/VENTOLIN HFA) 90 mcg/actuation inhaler; Inhale 2 puffs into the lungs every 4 (four) hours as needed for Wheezing or Shortness of Breath.  Dispense: 18 g; Refill: 11    Right hand pain  -     Cancel: Ambulatory referral/consult to Orthopedics; Future; Expected date: 06/12/2023    High risk medication use  -     Lipid Panel; Future; Expected date: 06/05/2023  -     CBC Auto Differential; Future; Expected date: 06/05/2023  -     Comprehensive Metabolic Panel; Future; Expected date: 06/05/2023  -     TSH; Future; Expected date: 06/05/2023  -     T4, Free; Future; Expected date: 06/05/2023  -     Hemoglobin A1C; Future; Expected date: 06/05/2023    Vitamin D deficiency  -     Vitamin D; Future;  Expected date: 06/05/2023    Subacute pansinusitis  -     cefUROXime (CEFTIN) 500 MG tablet; Take 1 tablet (500 mg total) by mouth 2 (two) times daily. for 10 days  Dispense: 20 tablet; Refill: 0        Risks, benefits, and side effects were discussed with the patient. All questions were answered to the fullest satisfaction of the patient, and pt verbalized understanding and agreement to treatment plan. Pt was to call with any new or worsening symptoms, or present to the ER.

## 2023-06-06 RX ORDER — METHYLPREDNISOLONE 4 MG/1
TABLET ORAL
Qty: 21 EACH | Refills: 0 | Status: SHIPPED | OUTPATIENT
Start: 2023-06-06 | End: 2023-06-27

## 2023-06-06 NOTE — DISCHARGE INSTRUCTIONS
Take benadryl 25-50mg every 6hrs as needed for itching. Stop taking the antibiotic and see your doctor for another prescription if necessary.

## 2023-06-06 NOTE — ED PROVIDER NOTES
Encounter Date: 6/5/2023       History     Chief Complaint   Patient presents with    Allergic Reaction     Pt just started on cefuroxime today, reports body wide itching, swelling to the face.      Pt here with c/o itching all over 1hr after taking cefuroxime for sinus infection. No sob. No throat sxs. She says her face feels swollen but daughter says it appears normal.     The history is provided by the patient.   Allergic Reaction  The primary symptoms are  rash and urticaria. The primary symptoms do not include wheezing, shortness of breath, cough, abdominal pain, nausea, vomiting, diarrhea, dizziness, palpitations, altered mental status or angioedema. The current episode started just prior to arrival.   The rash began today. The rash is associated with itching. The rash is not associated with blisters or weeping.   Significant symptoms also include itching. Significant symptoms that are not present include eye redness, flushing or rhinorrhea.   Review of patient's allergies indicates:   Allergen Reactions    Ketorolac tromethamine Other (See Comments)     Other reaction(s): Burning    Moxifloxacin Other (See Comments)     Heart racing.    Codeine Itching    Prevacid [lansoprazole] Itching     Past Medical History:   Diagnosis Date    Anxiety     Arthritis     Chronic sinusitis     Diabetes mellitus     Hypertension      Past Surgical History:   Procedure Laterality Date    BREAST BIOPSY      BREAST LUMPECTOMY Left     BUNIONECTOMY Bilateral     COLONOSCOPY      ESOPHAGOGASTRODUODENOSCOPY      ETHMOIDECTOMY Bilateral 2/7/2019    Procedure: ETHMOIDECTOMY;  Surgeon: Michael Grubbs MD;  Location: [facility] OR;  Service: ENT;  Laterality: Bilateral;  endoscopic anterior and posterior    FUNCTIONAL ENDOSCOPIC SINUS SURGERY (FESS) N/A 2/7/2019    Procedure: FESS (FUNCTIONAL ENDOSCOPIC SINUS SURGERY);  Surgeon: Michael Grubbs MD;  Location: [facility] OR;  Service: ENT;  Laterality: N/A;    FUNCTIONAL ENDOSCOPIC SINUS  SURGERY (FESS) Bilateral 2019    Procedure: FESS (FUNCTIONAL ENDOSCOPIC SINUS SURGERY);  Surgeon: Michael Grubbs MD;  Location: [facility] OR;  Service: ENT;  Laterality: Bilateral;    MAXILLARY ANTROSTOMY Bilateral 2019    Procedure: MAXILLARY ANTROSTOMY;  Surgeon: Michael Grubbs MD;  Location: [facility] OR;  Service: ENT;  Laterality: Bilateral;    NASAL SEPTOPLASTY Bilateral 2019    Procedure: SEPTOPLASTY, NOSE;  Surgeon: Michael Grubbs MD;  Location: [facility] OR;  Service: ENT;  Laterality: Bilateral;    NASAL TURBINATE REDUCTION Bilateral 2019    Procedure: REDUCTION, NASAL TURBINATE;  Surgeon: Michael Grubbs MD;  Location: [facility] OR;  Service: ENT;  Laterality: Bilateral;    SINUS SURGERY      x4    TUBAL LIGATION      WRIST SURGERY Left      Family History   Problem Relation Age of Onset    Breast cancer Neg Hx      Social History     Tobacco Use    Smoking status: Former     Packs/day: 0.75     Years: 25.00     Pack years: 18.75     Types: Cigarettes     Quit date: 1999     Years since quittin.1    Smokeless tobacco: Never   Substance Use Topics    Alcohol use: Yes     Comment: social    Drug use: No     Review of Systems   HENT:  Negative for rhinorrhea, sore throat and trouble swallowing.    Eyes:  Negative for redness.   Respiratory:  Negative for cough, shortness of breath and wheezing.    Cardiovascular:  Negative for palpitations.   Gastrointestinal:  Negative for abdominal pain, diarrhea, nausea and vomiting.   Skin:  Positive for itching and rash. Negative for flushing.   Neurological:  Negative for dizziness.   All other systems reviewed and are negative.    Physical Exam     Initial Vitals [23 2303]   BP Pulse Resp Temp SpO2   132/76 79 18 98.9 °F (37.2 °C) 98 %      MAP       --         Physical Exam    Nursing note and vitals reviewed.  Constitutional: She appears well-developed and well-nourished. She is not diaphoretic. She appears distressed.   uncomfortable    HENT:   Mouth/Throat: Oropharynx is clear and moist.   Eyes: Conjunctivae and EOM are normal.   Neck: Neck supple.   Normal range of motion.  Cardiovascular:  Normal rate, regular rhythm, normal heart sounds and intact distal pulses.           Pulmonary/Chest: Breath sounds normal. No stridor. She exhibits no tenderness.   Abdominal: Abdomen is soft. There is no abdominal tenderness.   Musculoskeletal:         General: No tenderness or edema. Normal range of motion.      Cervical back: Normal range of motion and neck supple.     Neurological: She is alert and oriented to person, place, and time. GCS score is 15. GCS eye subscore is 4. GCS verbal subscore is 5. GCS motor subscore is 6.   Skin: Skin is warm and dry. Capillary refill takes less than 2 seconds. Rash noted. No abscess noted. No erythema. No pallor.   Faint urticaria to extremities and trunk. Spares palms, soles and MM   Psychiatric:   anxious       ED Course   Procedures  Labs Reviewed - No data to display       Imaging Results    None          Medications   methylPREDNISolone sodium succinate injection 125 mg (125 mg Intramuscular Given 6/5/23 2317)   diphenhydrAMINE injection 50 mg (50 mg Intramuscular Given 6/5/23 2317)     Medical Decision Making:   ED Management:  Pt presented with mild urticaria after taking cephalosporin. No s/s anaphylaxis. She was given benadryl and solumedrol with some relief. Pt was advised to stop taking the abx and f/u with her doctor for another Rx if needed. Benadryl prn at home. Rx medrol dosepack.                        Clinical Impression:   Final diagnoses:  [T78.40XA] Allergic reaction, initial encounter (Primary)        ED Disposition Condition    Discharge Stable          ED Prescriptions       Medication Sig Dispense Start Date End Date Auth. Provider    methylPREDNISolone (MEDROL DOSEPACK) 4 mg tablet use as directed 21 each 6/6/2023 6/27/2023 Mo Suazo Jr., MD          Follow-up Information       Follow up  With Specialties Details Why Contact Info    Sonya Blackman NP Family Medicine Schedule an appointment as soon as possible for a visit   [address] [facility]  [address]  [place] [address] [phone]               Mo Suazo Jr., MD  06/06/23 0012

## 2023-06-07 ENCOUNTER — TELEPHONE (OUTPATIENT)
Dept: FAMILY MEDICINE | Facility: CLINIC | Age: 73
End: 2023-06-07
Payer: MEDICARE

## 2023-06-07 DIAGNOSIS — J01.40 SUBACUTE PANSINUSITIS: Primary | ICD-10-CM

## 2023-06-07 RX ORDER — AMOXICILLIN AND CLAVULANATE POTASSIUM 875; 125 MG/1; MG/1
1 TABLET, FILM COATED ORAL 2 TIMES DAILY
Qty: 14 TABLET | Refills: 0 | Status: SHIPPED | OUTPATIENT
Start: 2023-06-07 | End: 2023-06-07

## 2023-06-07 RX ORDER — AZITHROMYCIN 250 MG/1
TABLET, FILM COATED ORAL
Qty: 6 TABLET | Refills: 0 | Status: SHIPPED | OUTPATIENT
Start: 2023-06-07 | End: 2023-06-12

## 2023-06-24 ENCOUNTER — HOSPITAL ENCOUNTER (EMERGENCY)
Facility: HOSPITAL | Age: 73
Discharge: HOME | End: 2023-06-24
Payer: MEDICARE

## 2023-06-24 VITALS
DIASTOLIC BLOOD PRESSURE: 75 MMHG | OXYGEN SATURATION: 98 % | TEMPERATURE: 98 F | HEIGHT: 63 IN | SYSTOLIC BLOOD PRESSURE: 143 MMHG | HEART RATE: 68 BPM | BODY MASS INDEX: 31.01 KG/M2 | RESPIRATION RATE: 18 BRPM | WEIGHT: 175 LBS

## 2023-06-24 DIAGNOSIS — N76.4 LEFT GENITAL LABIAL ABSCESS: Primary | ICD-10-CM

## 2023-06-24 DIAGNOSIS — L73.9 FOLLICULITIS: ICD-10-CM

## 2023-06-24 LAB — POCT GLUCOSE: 316 MG/DL (ref 70–110)

## 2023-06-24 PROCEDURE — 82962 GLUCOSE BLOOD TEST: CPT

## 2023-06-24 PROCEDURE — 99283 EMERGENCY DEPT VISIT LOW MDM: CPT

## 2023-06-24 RX ORDER — SULFAMETHOXAZOLE AND TRIMETHOPRIM 800; 160 MG/1; MG/1
1 TABLET ORAL 2 TIMES DAILY
Qty: 14 TABLET | Refills: 0 | Status: SHIPPED | OUTPATIENT
Start: 2023-06-24 | End: 2023-07-01

## 2023-06-24 NOTE — ED PROVIDER NOTES
Encounter Date: 6/24/2023       History     Chief Complaint   Patient presents with    Abscess     Pt c/o L labial abscess x3 days. Hx DM. Denies fever/chills.        Presents to ED with complaints of abscess to labia. Has been present for about 3 days. Has had similar problem in the past. Area is tender, no drainage. No fever. Has not tried soaking or putting medicine on the area    Review of patient's allergies indicates:   Allergen Reactions    Cefuroxime Rash    Ketorolac tromethamine Other (See Comments)     Other reaction(s): Burning    Moxifloxacin Other (See Comments)     Heart racing.    Codeine Itching    Prevacid [lansoprazole] Itching     Past Medical History:   Diagnosis Date    Anxiety     Arthritis     Chronic sinusitis     Diabetes mellitus     Hypertension      Past Surgical History:   Procedure Laterality Date    BREAST BIOPSY      BREAST LUMPECTOMY Left     BUNIONECTOMY Bilateral     COLONOSCOPY      ESOPHAGOGASTRODUODENOSCOPY      ETHMOIDECTOMY Bilateral 2/7/2019    Procedure: ETHMOIDECTOMY;  Surgeon: Michael Grubbs MD;  Location: [facility] OR;  Service: ENT;  Laterality: Bilateral;  endoscopic anterior and posterior    FUNCTIONAL ENDOSCOPIC SINUS SURGERY (FESS) N/A 2/7/2019    Procedure: FESS (FUNCTIONAL ENDOSCOPIC SINUS SURGERY);  Surgeon: Michael Grubbs MD;  Location: [facility] OR;  Service: ENT;  Laterality: N/A;    FUNCTIONAL ENDOSCOPIC SINUS SURGERY (FESS) Bilateral 12/19/2019    Procedure: FESS (FUNCTIONAL ENDOSCOPIC SINUS SURGERY);  Surgeon: Michael Grubbs MD;  Location: [facility] OR;  Service: ENT;  Laterality: Bilateral;    MAXILLARY ANTROSTOMY Bilateral 2/7/2019    Procedure: MAXILLARY ANTROSTOMY;  Surgeon: Michael Grubbs MD;  Location: [facility] OR;  Service: ENT;  Laterality: Bilateral;    NASAL SEPTOPLASTY Bilateral 2/7/2019    Procedure: SEPTOPLASTY, NOSE;  Surgeon: Michael Grubbs MD;  Location: [facility] OR;  Service: ENT;  Laterality: Bilateral;    NASAL TURBINATE REDUCTION Bilateral  2019    Procedure: REDUCTION, NASAL TURBINATE;  Surgeon: Michael Grubbs MD;  Location: [facility] OR;  Service: ENT;  Laterality: Bilateral;    SINUS SURGERY      x4    TUBAL LIGATION      WRIST SURGERY Left      Family History   Problem Relation Age of Onset    Breast cancer Neg Hx      Social History     Tobacco Use    Smoking status: Former     Packs/day: 0.75     Years: 25.00     Pack years: 18.75     Types: Cigarettes     Quit date: 1999     Years since quittin.2    Smokeless tobacco: Never   Substance Use Topics    Alcohol use: Yes     Comment: social    Drug use: No     Review of Systems   Constitutional:  Negative for fever.   HENT:  Negative for sore throat.    Respiratory:  Negative for shortness of breath.    Cardiovascular:  Negative for chest pain.   Gastrointestinal:  Negative for nausea.   Genitourinary:  Negative for dysuria.        Lesion on labia   Musculoskeletal:  Negative for back pain.   Skin:  Negative for rash.   Neurological:  Negative for weakness.   Hematological:  Does not bruise/bleed easily.     Physical Exam     Initial Vitals [23 1052]   BP Pulse Resp Temp SpO2   (!) 143/75 68 18 98.1 °F (36.7 °C) 98 %      MAP       --         Physical Exam    Nursing note and vitals reviewed.  Constitutional: She appears well-developed and well-nourished.   HENT:   Head: Normocephalic and atraumatic.   Eyes: EOM are normal.   Neck: Neck supple.   Cardiovascular:  Normal rate and regular rhythm.           Pulmonary/Chest: Breath sounds normal. She has no wheezes.   Genitourinary:       Musculoskeletal:      Cervical back: Neck supple.     Neurological: She is alert and oriented to person, place, and time.   Skin: Skin is warm and dry. Capillary refill takes less than 2 seconds.   Psychiatric: She has a normal mood and affect.       ED Course   Procedures  Labs Reviewed   POCT GLUCOSE - Abnormal; Notable for the following components:       Result Value    POCT Glucose 316 (*)     All  other components within normal limits   POCT GLUCOSE MONITORING CONTINUOUS          Imaging Results    None          Medications - No data to display                           Clinical Impression:   Final diagnoses:  [N76.4] Left genital labial abscess (Primary)  [L73.9] Folliculitis        ED Disposition Condition    Discharge Good          ED Prescriptions       Medication Sig Dispense Start Date End Date Auth. Provider    sulfamethoxazole-trimethoprim 800-160mg (BACTRIM DS) 800-160 mg Tab Take 1 tablet by mouth 2 (two) times daily. for 7 days 14 tablet 6/24/2023 7/1/2023 NICKI Salcido          Follow-up Information       Follow up With Specialties Details Why Contact Info    Sonya Blackman NP Family Medicine  As needed [address] [facility] [facility] [facility]  [address]LO[address]  [facility] [place] [address] [phone]               NICKI Salcido  06/24/23 1200

## 2023-06-26 ENCOUNTER — HOSPITAL ENCOUNTER (EMERGENCY)
Facility: HOSPITAL | Age: 73
Discharge: HOME | End: 2023-06-26
Attending: EMERGENCY MEDICINE
Payer: MEDICARE

## 2023-06-26 VITALS
BODY MASS INDEX: 31.28 KG/M2 | TEMPERATURE: 99 F | OXYGEN SATURATION: 99 % | WEIGHT: 170 LBS | DIASTOLIC BLOOD PRESSURE: 55 MMHG | HEIGHT: 62 IN | RESPIRATION RATE: 18 BRPM | SYSTOLIC BLOOD PRESSURE: 129 MMHG | HEART RATE: 64 BPM

## 2023-06-26 DIAGNOSIS — L73.9 FOLLICULITIS: Primary | ICD-10-CM

## 2023-06-26 LAB — POCT GLUCOSE: 274 MG/DL (ref 70–110)

## 2023-06-26 PROCEDURE — 56405 I&D VULVA/PERINEAL ABSCESS: CPT

## 2023-06-26 PROCEDURE — 99282 EMERGENCY DEPT VISIT SF MDM: CPT | Mod: 25

## 2023-06-26 PROCEDURE — 82962 GLUCOSE BLOOD TEST: CPT

## 2023-06-26 NOTE — DISCHARGE INSTRUCTIONS
Rest, increase fluids, lots of water and liquids.  Lots of warm soapy up some salt soaks.  Call office for recheck.  Return as needed.  Continue Bactrim as prescribed.  Tylenol and or Motrin as needed if not contraindicated

## 2023-06-27 NOTE — ED PROVIDER NOTES
Encounter Date: 6/26/2023       History     Chief Complaint   Patient presents with    Abscess     Pt seen on Saturday for L labial abscess. Received abx. Pt reports increase in size and pain. Reports no drainage.      POV to the ED alone.  Patient complains of a sore in the vaginal area.  States she was treatment ED 2 days ago.  Prescription for Bactrim.  Denies fever or vomiting.  Presents for recheck.  No other complaints    The history is provided by the patient.   Review of patient's allergies indicates:   Allergen Reactions    Cefuroxime Rash    Ketorolac tromethamine Other (See Comments)     Other reaction(s): Burning    Moxifloxacin Other (See Comments)     Heart racing.    Codeine Itching    Prevacid [lansoprazole] Itching     Past Medical History:   Diagnosis Date    Anxiety     Arthritis     Chronic sinusitis     Diabetes mellitus     Hypertension      Past Surgical History:   Procedure Laterality Date    BREAST BIOPSY      BREAST LUMPECTOMY Left     BUNIONECTOMY Bilateral     COLONOSCOPY      ESOPHAGOGASTRODUODENOSCOPY      ETHMOIDECTOMY Bilateral 2/7/2019    Procedure: ETHMOIDECTOMY;  Surgeon: Michael Grubbs MD;  Location: [facility] OR;  Service: ENT;  Laterality: Bilateral;  endoscopic anterior and posterior    FUNCTIONAL ENDOSCOPIC SINUS SURGERY (FESS) N/A 2/7/2019    Procedure: FESS (FUNCTIONAL ENDOSCOPIC SINUS SURGERY);  Surgeon: Michael Grubbs MD;  Location: [facility] OR;  Service: ENT;  Laterality: N/A;    FUNCTIONAL ENDOSCOPIC SINUS SURGERY (FESS) Bilateral 12/19/2019    Procedure: FESS (FUNCTIONAL ENDOSCOPIC SINUS SURGERY);  Surgeon: Michael Grubbs MD;  Location: [facility] OR;  Service: ENT;  Laterality: Bilateral;    MAXILLARY ANTROSTOMY Bilateral 2/7/2019    Procedure: MAXILLARY ANTROSTOMY;  Surgeon: Michael Grubbs MD;  Location: [facility] OR;  Service: ENT;  Laterality: Bilateral;    NASAL SEPTOPLASTY Bilateral 2/7/2019    Procedure: SEPTOPLASTY, NOSE;  Surgeon: Michael Grubbs MD;  Location:  "[facility] OR;  Service: ENT;  Laterality: Bilateral;    NASAL TURBINATE REDUCTION Bilateral 2019    Procedure: REDUCTION, NASAL TURBINATE;  Surgeon: Michael Grubbs MD;  Location: [facility] OR;  Service: ENT;  Laterality: Bilateral;    SINUS SURGERY      x4    TUBAL LIGATION      WRIST SURGERY Left      Family History   Problem Relation Age of Onset    Breast cancer Neg Hx      Social History     Tobacco Use    Smoking status: Former     Packs/day: 0.75     Years: 25.00     Pack years: 18.75     Types: Cigarettes     Quit date: 1999     Years since quittin.2    Smokeless tobacco: Never   Substance Use Topics    Alcohol use: Yes     Comment: social    Drug use: No     Review of Systems   Constitutional:  Negative for chills and fever.   HENT:  Negative for ear pain and sore throat.    Respiratory:  Negative for cough and shortness of breath.    Cardiovascular:  Negative for chest pain.   Gastrointestinal:  Negative for abdominal pain, diarrhea, nausea and vomiting.   Genitourinary:  Negative for dysuria.   Skin:         Sore on the vagina   All other systems reviewed and are negative.    Physical Exam     Initial Vitals [23 1439]   BP Pulse Resp Temp SpO2   (!) 129/55 64 18 98.9 °F (37.2 °C) 99 %      MAP       --         Physical Exam    Nursing note and vitals reviewed.  Constitutional: She appears well-developed and well-nourished. No distress.   HENT:   Head: Normocephalic.   Mouth/Throat: Oropharynx is clear and moist.   Eyes: Pupils are equal, round, and reactive to light.   Neck:   Normal range of motion.  Cardiovascular:  Normal rate and regular rhythm.           Pulmonary/Chest: Breath sounds normal.   Abdominal: Abdomen is soft. There is no abdominal tenderness.   Genitourinary:    Genitourinary Comments: Small pustular lesion noted. 2 mm?? Very small. Tender. Patient asks for this to be "poked".     Musculoskeletal:         General: Normal range of motion.      Cervical back: Normal range of " motion.     Neurological: She is alert and oriented to person, place, and time. She has normal strength. GCS score is 15. GCS eye subscore is 4. GCS verbal subscore is 5. GCS motor subscore is 6.   Skin: Skin is warm and dry. Capillary refill takes less than 2 seconds.   Psychiatric: She has a normal mood and affect. Thought content normal.       ED Course   I & D - Incision and Drainage    Date/Time: 6/26/2023 8:23 PM  Location procedure was performed: [facility] EMERGENCY DEPARTMENT  Performed by: Jennie Najera NP  Authorized by: Bird Downey MD   Assisting provider: Bird Downey MD  Pre-operative diagnosis: abscess  Post-operative diagnosis: folliculitis  Consent Done: Yes  Consent: Verbal consent obtained.  Consent given by: patient  Patient identity confirmed: name and verbally with patient  Type: abscess  Location: external skin left labia.    Patient sedated: no  Description of findings: one small purulent drop   Scalpel size: 11  Incision type: single straight  Incision depth: subcutaneous  Complexity: simple  Drainage: pus  Drainage amount: scant  Wound treatment: wound left open  Technical procedures used: none  Significant surgical tasks conducted by the assistant(s): none  Complications: No  Estimated blood loss (mL): 0  Specimens: No  Implants: No  Patient tolerance: Patient tolerated the procedure well with no immediate complications    Incision depth: subcutaneous      Labs Reviewed   POCT GLUCOSE - Abnormal; Notable for the following components:       Result Value    POCT Glucose 274 (*)     All other components within normal limits   POCT GLUCOSE MONITORING CONTINUOUS          Imaging Results    None          Medications - No data to display  Medical Decision Making:   Initial Assessment:   Presents for evaluation of sore on the labia, disposition pending  Differential Diagnosis:   Abscess, cellulitis, Bartholin's cyst, folliculitis  ED Management:  Patient was currently prescribed  Bactrim.  He is to continue Bactrim.  Warm Epsom salt soaks.  Clinic referral information.  Continue her prescribed medications.  She agrees with plan of care           ED Course as of 06/26/23 2026 Mon Jun 26, 2023   1736 POCT glucose   Final result 06/26 1444  POCT Glucose 274     [CP]      ED Course User Index  [CP] Jennie Najera NP                 Clinical Impression:   Final diagnoses:  [L73.9] Folliculitis (Primary)        ED Disposition Condition    Discharge Stable          ED Prescriptions    None       Follow-up Information       Follow up With Specialties Details Why Contact Info    Sonya Blackman NP Family Medicine Call in 3 days For office recheck [address]  [address]  [place] [address] [phone]               Jennie Najera NP  06/26/23 2026

## 2023-07-04 ENCOUNTER — TELEPHONE (OUTPATIENT)
Dept: FAMILY MEDICINE | Facility: CLINIC | Age: 73
End: 2023-07-04
Payer: MEDICARE

## 2023-07-17 ENCOUNTER — LAB VISIT (OUTPATIENT)
Dept: LAB | Facility: HOSPITAL | Age: 73
End: 2023-07-17
Attending: NURSE PRACTITIONER
Payer: MEDICARE

## 2023-07-17 DIAGNOSIS — E11.69 TYPE 2 DIABETES MELLITUS WITH HYPERLIPIDEMIA: ICD-10-CM

## 2023-07-17 DIAGNOSIS — I10 ESSENTIAL HYPERTENSION: ICD-10-CM

## 2023-07-17 DIAGNOSIS — E55.9 VITAMIN D DEFICIENCY: ICD-10-CM

## 2023-07-17 DIAGNOSIS — E78.5 TYPE 2 DIABETES MELLITUS WITH HYPERLIPIDEMIA: ICD-10-CM

## 2023-07-17 DIAGNOSIS — Z79.899 HIGH RISK MEDICATION USE: ICD-10-CM

## 2023-07-17 LAB
25(OH)D3+25(OH)D2 SERPL-MCNC: 42 NG/ML (ref 30–96)
ALBUMIN SERPL BCP-MCNC: 4 G/DL (ref 3.5–5.2)
ALP SERPL-CCNC: 103 U/L (ref 55–135)
ALT SERPL W/O P-5'-P-CCNC: 14 U/L (ref 10–44)
ANION GAP SERPL CALC-SCNC: 11 MMOL/L (ref 8–16)
AST SERPL-CCNC: 13 U/L (ref 10–40)
BASOPHILS # BLD AUTO: 0.03 K/UL (ref 0–0.2)
BASOPHILS NFR BLD: 0.2 % (ref 0–1.9)
BILIRUB SERPL-MCNC: 0.3 MG/DL (ref 0.1–1)
BUN SERPL-MCNC: 15 MG/DL (ref 8–23)
CALCIUM SERPL-MCNC: 9.8 MG/DL (ref 8.7–10.5)
CHLORIDE SERPL-SCNC: 101 MMOL/L (ref 95–110)
CHOLEST SERPL-MCNC: 185 MG/DL (ref 120–199)
CHOLEST/HDLC SERPL: 2.6 {RATIO} (ref 2–5)
CO2 SERPL-SCNC: 27 MMOL/L (ref 23–29)
CREAT SERPL-MCNC: 0.9 MG/DL (ref 0.5–1.4)
DIFFERENTIAL METHOD: ABNORMAL
EOSINOPHIL # BLD AUTO: 0 K/UL (ref 0–0.5)
EOSINOPHIL NFR BLD: 0.3 % (ref 0–8)
ERYTHROCYTE [DISTWIDTH] IN BLOOD BY AUTOMATED COUNT: 17.1 % (ref 11.5–14.5)
EST. GFR  (NO RACE VARIABLE): >60 ML/MIN/1.73 M^2
ESTIMATED AVG GLUCOSE: 214 MG/DL (ref 68–131)
GLUCOSE SERPL-MCNC: 208 MG/DL (ref 70–110)
HBA1C MFR BLD: 9.1 % (ref 4–5.6)
HCT VFR BLD AUTO: 39.4 % (ref 37–48.5)
HDLC SERPL-MCNC: 70 MG/DL (ref 40–75)
HDLC SERPL: 37.8 % (ref 20–50)
HGB BLD-MCNC: 12.7 G/DL (ref 12–16)
IMM GRANULOCYTES # BLD AUTO: 0.06 K/UL (ref 0–0.04)
IMM GRANULOCYTES NFR BLD AUTO: 0.5 % (ref 0–0.5)
LDLC SERPL CALC-MCNC: 100.2 MG/DL (ref 63–159)
LYMPHOCYTES # BLD AUTO: 4.6 K/UL (ref 1–4.8)
LYMPHOCYTES NFR BLD: 37.4 % (ref 18–48)
MCH RBC QN AUTO: 22.9 PG (ref 27–31)
MCHC RBC AUTO-ENTMCNC: 32.2 G/DL (ref 32–36)
MCV RBC AUTO: 71 FL (ref 82–98)
MONOCYTES # BLD AUTO: 0.8 K/UL (ref 0.3–1)
MONOCYTES NFR BLD: 6.5 % (ref 4–15)
NEUTROPHILS # BLD AUTO: 6.7 K/UL (ref 1.8–7.7)
NEUTROPHILS NFR BLD: 55.1 % (ref 38–73)
NONHDLC SERPL-MCNC: 115 MG/DL
NRBC BLD-RTO: 0 /100 WBC
PLATELET # BLD AUTO: 369 K/UL (ref 150–450)
PMV BLD AUTO: 9.9 FL (ref 9.2–12.9)
POTASSIUM SERPL-SCNC: 3.6 MMOL/L (ref 3.5–5.1)
PROT SERPL-MCNC: 8.5 G/DL (ref 6–8.4)
RBC # BLD AUTO: 5.54 M/UL (ref 4–5.4)
SODIUM SERPL-SCNC: 139 MMOL/L (ref 136–145)
T4 FREE SERPL-MCNC: 0.79 NG/DL (ref 0.71–1.51)
TRIGL SERPL-MCNC: 74 MG/DL (ref 30–150)
TSH SERPL DL<=0.005 MIU/L-ACNC: 2.9 UIU/ML (ref 0.4–4)
WBC # BLD AUTO: 12.23 K/UL (ref 3.9–12.7)

## 2023-07-17 PROCEDURE — 36415 COLL VENOUS BLD VENIPUNCTURE: CPT | Performed by: NURSE PRACTITIONER

## 2023-07-17 PROCEDURE — 82306 VITAMIN D 25 HYDROXY: CPT | Performed by: NURSE PRACTITIONER

## 2023-07-17 PROCEDURE — 84443 ASSAY THYROID STIM HORMONE: CPT | Performed by: NURSE PRACTITIONER

## 2023-07-17 PROCEDURE — 80061 LIPID PANEL: CPT | Performed by: NURSE PRACTITIONER

## 2023-07-17 PROCEDURE — 85025 COMPLETE CBC W/AUTO DIFF WBC: CPT | Performed by: NURSE PRACTITIONER

## 2023-07-17 PROCEDURE — 83036 HEMOGLOBIN GLYCOSYLATED A1C: CPT | Performed by: NURSE PRACTITIONER

## 2023-07-17 PROCEDURE — 84439 ASSAY OF FREE THYROXINE: CPT | Performed by: NURSE PRACTITIONER

## 2023-07-17 PROCEDURE — 80053 COMPREHEN METABOLIC PANEL: CPT | Performed by: NURSE PRACTITIONER

## 2023-08-08 ENCOUNTER — TELEPHONE (OUTPATIENT)
Dept: FAMILY MEDICINE | Facility: CLINIC | Age: 73
End: 2023-08-08
Payer: MEDICARE

## 2023-08-14 ENCOUNTER — OFFICE VISIT (OUTPATIENT)
Dept: FAMILY MEDICINE | Facility: CLINIC | Age: 73
End: 2023-08-14
Payer: MEDICARE

## 2023-08-14 VITALS
SYSTOLIC BLOOD PRESSURE: 102 MMHG | BODY MASS INDEX: 32.02 KG/M2 | HEART RATE: 65 BPM | OXYGEN SATURATION: 98 % | WEIGHT: 174 LBS | HEIGHT: 62 IN | RESPIRATION RATE: 18 BRPM | DIASTOLIC BLOOD PRESSURE: 68 MMHG

## 2023-08-14 DIAGNOSIS — M25.512 ACUTE PAIN OF LEFT SHOULDER: Primary | ICD-10-CM

## 2023-08-14 DIAGNOSIS — R10.13 EPIGASTRIC PAIN: ICD-10-CM

## 2023-08-14 DIAGNOSIS — E11.69 TYPE 2 DIABETES MELLITUS WITH HYPERLIPIDEMIA: ICD-10-CM

## 2023-08-14 DIAGNOSIS — F41.1 GAD (GENERALIZED ANXIETY DISORDER): ICD-10-CM

## 2023-08-14 DIAGNOSIS — E78.5 TYPE 2 DIABETES MELLITUS WITH HYPERLIPIDEMIA: ICD-10-CM

## 2023-08-14 PROCEDURE — 3074F SYST BP LT 130 MM HG: CPT | Mod: CPTII,S$GLB,, | Performed by: NURSE PRACTITIONER

## 2023-08-14 PROCEDURE — 3078F PR MOST RECENT DIASTOLIC BLOOD PRESSURE < 80 MM HG: ICD-10-PCS | Mod: CPTII,S$GLB,, | Performed by: NURSE PRACTITIONER

## 2023-08-14 PROCEDURE — 1125F PR PAIN SEVERITY QUANTIFIED, PAIN PRESENT: ICD-10-PCS | Mod: CPTII,S$GLB,, | Performed by: NURSE PRACTITIONER

## 2023-08-14 PROCEDURE — 1125F AMNT PAIN NOTED PAIN PRSNT: CPT | Mod: CPTII,S$GLB,, | Performed by: NURSE PRACTITIONER

## 2023-08-14 PROCEDURE — 1157F PR ADVANCE CARE PLAN OR EQUIV PRESENT IN MEDICAL RECORD: ICD-10-PCS | Mod: CPTII,S$GLB,, | Performed by: NURSE PRACTITIONER

## 2023-08-14 PROCEDURE — 3008F BODY MASS INDEX DOCD: CPT | Mod: CPTII,S$GLB,, | Performed by: NURSE PRACTITIONER

## 2023-08-14 PROCEDURE — 1101F PR PT FALLS ASSESS DOC 0-1 FALLS W/OUT INJ PAST YR: ICD-10-PCS | Mod: CPTII,S$GLB,, | Performed by: NURSE PRACTITIONER

## 2023-08-14 PROCEDURE — 3078F DIAST BP <80 MM HG: CPT | Mod: CPTII,S$GLB,, | Performed by: NURSE PRACTITIONER

## 2023-08-14 PROCEDURE — 1157F ADVNC CARE PLAN IN RCRD: CPT | Mod: CPTII,S$GLB,, | Performed by: NURSE PRACTITIONER

## 2023-08-14 PROCEDURE — 1160F PR REVIEW ALL MEDS BY PRESCRIBER/CLIN PHARMACIST DOCUMENTED: ICD-10-PCS | Mod: CPTII,S$GLB,, | Performed by: NURSE PRACTITIONER

## 2023-08-14 PROCEDURE — 99214 PR OFFICE/OUTPT VISIT, EST, LEVL IV, 30-39 MIN: ICD-10-PCS | Mod: S$GLB,,, | Performed by: NURSE PRACTITIONER

## 2023-08-14 PROCEDURE — 1159F MED LIST DOCD IN RCRD: CPT | Mod: CPTII,S$GLB,, | Performed by: NURSE PRACTITIONER

## 2023-08-14 PROCEDURE — 99999 PR PBB SHADOW E&M-EST. PATIENT-LVL V: CPT | Mod: PBBFAC,,, | Performed by: NURSE PRACTITIONER

## 2023-08-14 PROCEDURE — 3046F PR MOST RECENT HEMOGLOBIN A1C LEVEL > 9.0%: ICD-10-PCS | Mod: CPTII,S$GLB,, | Performed by: NURSE PRACTITIONER

## 2023-08-14 PROCEDURE — 3074F PR MOST RECENT SYSTOLIC BLOOD PRESSURE < 130 MM HG: ICD-10-PCS | Mod: CPTII,S$GLB,, | Performed by: NURSE PRACTITIONER

## 2023-08-14 PROCEDURE — 1160F RVW MEDS BY RX/DR IN RCRD: CPT | Mod: CPTII,S$GLB,, | Performed by: NURSE PRACTITIONER

## 2023-08-14 PROCEDURE — 99214 OFFICE O/P EST MOD 30 MIN: CPT | Mod: S$GLB,,, | Performed by: NURSE PRACTITIONER

## 2023-08-14 PROCEDURE — 3008F PR BODY MASS INDEX (BMI) DOCUMENTED: ICD-10-PCS | Mod: CPTII,S$GLB,, | Performed by: NURSE PRACTITIONER

## 2023-08-14 PROCEDURE — 1101F PT FALLS ASSESS-DOCD LE1/YR: CPT | Mod: CPTII,S$GLB,, | Performed by: NURSE PRACTITIONER

## 2023-08-14 PROCEDURE — 3288F PR FALLS RISK ASSESSMENT DOCUMENTED: ICD-10-PCS | Mod: CPTII,S$GLB,, | Performed by: NURSE PRACTITIONER

## 2023-08-14 PROCEDURE — 1159F PR MEDICATION LIST DOCUMENTED IN MEDICAL RECORD: ICD-10-PCS | Mod: CPTII,S$GLB,, | Performed by: NURSE PRACTITIONER

## 2023-08-14 PROCEDURE — 99999 PR PBB SHADOW E&M-EST. PATIENT-LVL V: ICD-10-PCS | Mod: PBBFAC,,, | Performed by: NURSE PRACTITIONER

## 2023-08-14 PROCEDURE — 3046F HEMOGLOBIN A1C LEVEL >9.0%: CPT | Mod: CPTII,S$GLB,, | Performed by: NURSE PRACTITIONER

## 2023-08-14 PROCEDURE — 3288F FALL RISK ASSESSMENT DOCD: CPT | Mod: CPTII,S$GLB,, | Performed by: NURSE PRACTITIONER

## 2023-08-14 RX ORDER — CYCLOBENZAPRINE HCL 5 MG
5 TABLET ORAL 3 TIMES DAILY PRN
Qty: 30 TABLET | Refills: 4 | Status: SHIPPED | OUTPATIENT
Start: 2023-08-14 | End: 2024-01-03

## 2023-08-14 RX ORDER — IBUPROFEN 800 MG/1
800 TABLET ORAL 2 TIMES DAILY PRN
Qty: 60 TABLET | Refills: 5 | Status: SHIPPED | OUTPATIENT
Start: 2023-08-14

## 2023-08-14 RX ORDER — BUSPIRONE HYDROCHLORIDE 30 MG/1
TABLET ORAL
COMMUNITY
Start: 2023-06-27 | End: 2023-08-14

## 2023-08-14 RX ORDER — PAROXETINE 10 MG/1
10 TABLET, FILM COATED ORAL
COMMUNITY
Start: 2023-06-27 | End: 2023-11-14 | Stop reason: SDUPTHER

## 2023-08-14 RX ORDER — BUSPIRONE HYDROCHLORIDE 15 MG/1
15 TABLET ORAL 2 TIMES DAILY
Qty: 60 TABLET | Refills: 5 | Status: SHIPPED | OUTPATIENT
Start: 2023-08-14 | End: 2024-04-03 | Stop reason: SDUPTHER

## 2023-08-14 RX ORDER — LANCETS
EACH MISCELLANEOUS
Qty: 200 EACH | Refills: 0 | Status: SHIPPED | OUTPATIENT
Start: 2023-08-14

## 2023-08-14 RX ORDER — PANTOPRAZOLE SODIUM 20 MG/1
20 TABLET, DELAYED RELEASE ORAL DAILY
Qty: 30 TABLET | Refills: 5 | Status: SHIPPED | OUTPATIENT
Start: 2023-08-14 | End: 2024-04-03

## 2023-08-14 RX ORDER — INSULIN PUMP SYRINGE, 3 ML
EACH MISCELLANEOUS
Qty: 1 EACH | Refills: 0 | Status: SHIPPED | OUTPATIENT
Start: 2023-08-14 | End: 2024-08-13

## 2023-08-14 RX ORDER — DICLOFENAC SODIUM 10 MG/G
GEL TOPICAL
COMMUNITY
Start: 2023-07-14 | End: 2024-04-03 | Stop reason: SDUPTHER

## 2023-08-14 NOTE — PROGRESS NOTES
.   carlitosvm on auth # informing pt of results and dr directives, informed to call back with any questions or concerns

## 2023-08-14 NOTE — PROGRESS NOTES
"Subjective:       Patient ID: Argelia Pyle is a 72 y.o. female.    Chief Complaint: 4 week follow up: , lab results, Diabetes, right finger pain, left shoulder pain, and Referral (Ortho/)  -  The pt is a 71 y/o female that presents for general follow up and c/o left shoulder pain x 3 weeks since she moved to another house. States at the shoulder radiates down her arm "feels cold and is trying to go to sleep".     Discussed uncontrolled diabetes, admits to not eating right. Granddaughter is presents and was not aware of condition. Is not using a pill planner and question if she is taking medication correctly yet granddaughter is a MA and willing to help do home evaluation.  -    Past Medical History:   Diagnosis Date    Anxiety     Arthritis     Chronic sinusitis     Diabetes mellitus     Hypertension        Past Surgical History:   Procedure Laterality Date    BREAST BIOPSY      BREAST LUMPECTOMY Left     BUNIONECTOMY Bilateral     COLONOSCOPY      ESOPHAGOGASTRODUODENOSCOPY      ETHMOIDECTOMY Bilateral 2/7/2019    Procedure: ETHMOIDECTOMY;  Surgeon: Michael Grubbs MD;  Location: [facility] OR;  Service: ENT;  Laterality: Bilateral;  endoscopic anterior and posterior    FUNCTIONAL ENDOSCOPIC SINUS SURGERY (FESS) N/A 2/7/2019    Procedure: FESS (FUNCTIONAL ENDOSCOPIC SINUS SURGERY);  Surgeon: Michael Grubbs MD;  Location: [facility] OR;  Service: ENT;  Laterality: N/A;    FUNCTIONAL ENDOSCOPIC SINUS SURGERY (FESS) Bilateral 12/19/2019    Procedure: FESS (FUNCTIONAL ENDOSCOPIC SINUS SURGERY);  Surgeon: Michael Grubbs MD;  Location: [facility] OR;  Service: ENT;  Laterality: Bilateral;    MAXILLARY ANTROSTOMY Bilateral 2/7/2019    Procedure: MAXILLARY ANTROSTOMY;  Surgeon: Michael Grubbs MD;  Location: [facility] OR;  Service: ENT;  Laterality: Bilateral;    NASAL SEPTOPLASTY Bilateral 2/7/2019    Procedure: SEPTOPLASTY, NOSE;  Surgeon: Michael Grubbs MD;  Location: [facility] OR;  Service: ENT;  Laterality: Bilateral;    " NASAL TURBINATE REDUCTION Bilateral 2019    Procedure: REDUCTION, NASAL TURBINATE;  Surgeon: Michael Grubbs MD;  Location: [facility] OR;  Service: ENT;  Laterality: Bilateral;    SINUS SURGERY      x4    TUBAL LIGATION      WRIST SURGERY Left         Social History     Socioeconomic History    Marital status:    Tobacco Use    Smoking status: Former     Current packs/day: 0.00     Average packs/day: 0.8 packs/day for 25.0 years (18.8 ttl pk-yrs)     Types: Cigarettes     Start date: 1974     Quit date: 1999     Years since quittin.3    Smokeless tobacco: Never   Substance and Sexual Activity    Alcohol use: Yes     Comment: social    Drug use: No    Sexual activity: Not Currently     Birth control/protection: See Surgical Hx       Family History   Problem Relation Age of Onset    Breast cancer Neg Hx        Review of patient's allergies indicates:   Allergen Reactions    Cefuroxime Rash    Ketorolac tromethamine Other (See Comments)     Other reaction(s): Burning    Moxifloxacin Other (See Comments)     Heart racing.    Codeine Itching    Famotidine-ca carb-mag hydrox Hives    Prevacid [lansoprazole] Itching          Current Outpatient Medications:     albuterol (PROVENTIL/VENTOLIN HFA) 90 mcg/actuation inhaler, Inhale 2 puffs into the lungs every 4 (four) hours as needed for Wheezing or Shortness of Breath., Disp: 18 g, Rfl: 11    amLODIPine (NORVASC) 10 MG tablet, Take 1 tablet (10 mg total) by mouth every evening., Disp: 90 tablet, Rfl: 1    aspirin (ECOTRIN) 81 MG EC tablet, Take 81 mg by mouth every evening., Disp: , Rfl:     azelastine (ASTELIN) 137 mcg (0.1 %) nasal spray, 1 spray (137 mcg total) by Nasal route 2 (two) times daily as needed for Rhinitis., Disp: 30 mL, Rfl: 11    cetirizine (ZYRTEC) 10 MG tablet, Take 1 tablet (10 mg total) by mouth daily as needed for Allergies., Disp: 90 tablet, Rfl: 1    diclofenac sodium (VOLTAREN) 1 % Gel, APPLY TOPICALLY TO THE AFFECTED AREA  FOUR TIMES DAILY, Disp: , Rfl:     fluticasone propionate (FLONASE) 50 mcg/actuation nasal spray, 2 sprays (100 mcg total) by Each Nostril route daily as needed for Rhinitis., Disp: 15.8 mL, Rfl: 11    losartan-hydrochlorothiazide 100-25 mg (HYZAAR) 100-25 mg per tablet, Take 1 tablet by mouth every morning., Disp: 90 tablet, Rfl: 1    metFORMIN (GLUCOPHAGE) 1000 MG tablet, Take 1,000 mg by mouth 2 (two) times daily., Disp: , Rfl:     metoprolol tartrate (LOPRESSOR) 50 MG tablet, Take 1 tablet (50 mg total) by mouth 2 (two) times daily., Disp: 180 tablet, Rfl: 1    paroxetine (PAXIL) 10 MG tablet, Take 10 mg by mouth., Disp: , Rfl:     pregabalin (LYRICA) 75 MG capsule, Take 1 capsule (75 mg total) by mouth 2 (two) times daily., Disp: 180 capsule, Rfl: 1    simvastatin (ZOCOR) 20 MG tablet, Take 1 tablet (20 mg total) by mouth nightly., Disp: 90 tablet, Rfl: 1    traZODone (DESYREL) 50 MG tablet, Take 1 tablet (50 mg total) by mouth nightly as needed for Insomnia., Disp: 90 tablet, Rfl: 1    blood sugar diagnostic Strp, To check BG 1 time daily, to use with insurance preferred meter, Disp: 200 strip, Rfl: 11    blood-glucose meter kit, To check BG 1 time daily, to use with insurance preferred meter, Disp: 1 each, Rfl: 0    busPIRone (BUSPAR) 15 MG tablet, Take 1 tablet (15 mg total) by mouth 2 (two) times daily., Disp: 60 tablet, Rfl: 5    cyclobenzaprine (FLEXERIL) 5 MG tablet, Take 1 tablet (5 mg total) by mouth 3 (three) times daily as needed for Muscle spasms., Disp: 30 tablet, Rfl: 4    empagliflozin (JARDIANCE) 25 mg tablet, Take 1 tablet (25 mg total) by mouth once daily., Disp: 30 tablet, Rfl: 5    ibuprofen (ADVIL,MOTRIN) 800 MG tablet, Take 1 tablet (800 mg total) by mouth 2 (two) times daily as needed for Pain., Disp: 60 tablet, Rfl: 5    lancets Misc, To check BG 1 time daily, to use with insurance preferred meter, Disp: 200 each, Rfl: 0    pantoprazole (PROTONIX) 20 MG tablet, Take 1 tablet (20 mg  total) by mouth once daily., Disp: 30 tablet, Rfl: 5    Diabetes      Review of Systems   Constitutional: Negative.    HENT: Negative.     Eyes: Negative.    Respiratory: Negative.     Cardiovascular: Negative.    Gastrointestinal: Negative.    Endocrine: Negative.    Genitourinary: Negative.    Musculoskeletal:         Left shoulder pain   Skin: Negative.    Allergic/Immunologic: Negative.    Neurological: Negative.    Hematological: Negative.    Psychiatric/Behavioral: Negative.         Objective:      Physical Exam  Vitals reviewed. Exam conducted with a chaperone present (granddaughter).   Constitutional:       Appearance: Normal appearance. She is well-developed. She is obese.   HENT:      Head: Normocephalic and atraumatic.   Eyes:      General: No scleral icterus.     Conjunctiva/sclera: Conjunctivae normal.      Pupils: Pupils are equal, round, and reactive to light.   Neck:      Thyroid: No thyromegaly.   Cardiovascular:      Rate and Rhythm: Normal rate and regular rhythm.      Heart sounds: No murmur heard.  Pulmonary:      Effort: Pulmonary effort is normal. No respiratory distress.   Abdominal:      General: Bowel sounds are normal. There is no distension.      Palpations: Abdomen is soft.      Tenderness: There is no abdominal tenderness.   Musculoskeletal:         General: Normal range of motion.        Arms:       Cervical back: Normal range of motion and neck supple.   Skin:     General: Skin is warm.      Capillary Refill: Capillary refill takes less than 2 seconds.      Findings: No rash.   Neurological:      Mental Status: She is alert and oriented to person, place, and time.      Sensory: No sensory deficit.      Motor: No abnormal muscle tone.   Psychiatric:         Behavior: Behavior normal.         Thought Content: Thought content normal.         Judgment: Judgment normal.         Assessment:       1. Acute pain of left shoulder    2. Type 2 diabetes mellitus with hyperlipidemia    3.  Epigastric pain    4. MIKO (generalized anxiety disorder)        Plan:     1- MA to investigate why she can not do mammo at this facility, pt reports out of network.  2- inc jardiance   3- lexapro d/c- duplicate SSRI  4- stop pepcid  5- start protonix  6-   7- glucose meter/supplies printed  8- ibuporfen 800 mg bid for arthritic pain  9- RTC 3 months  -     Acute pain of left shoulder  -     ibuprofen (ADVIL,MOTRIN) 800 MG tablet; Take 1 tablet (800 mg total) by mouth 2 (two) times daily as needed for Pain.  Dispense: 60 tablet; Refill: 5  -     cyclobenzaprine (FLEXERIL) 5 MG tablet; Take 1 tablet (5 mg total) by mouth 3 (three) times daily as needed for Muscle spasms.  Dispense: 30 tablet; Refill: 4    Type 2 diabetes mellitus with hyperlipidemia  -     blood-glucose meter kit; To check BG 1 time daily, to use with insurance preferred meter  Dispense: 1 each; Refill: 0  -     lancets Misc; To check BG 1 time daily, to use with insurance preferred meter  Dispense: 200 each; Refill: 0  -     blood sugar diagnostic Strp; To check BG 1 time daily, to use with insurance preferred meter  Dispense: 200 strip; Refill: 11  -     empagliflozin (JARDIANCE) 25 mg tablet; Take 1 tablet (25 mg total) by mouth once daily.  Dispense: 30 tablet; Refill: 5    Epigastric pain  -     pantoprazole (PROTONIX) 20 MG tablet; Take 1 tablet (20 mg total) by mouth once daily.  Dispense: 30 tablet; Refill: 5    MIKO (generalized anxiety disorder)  -     busPIRone (BUSPAR) 15 MG tablet; Take 1 tablet (15 mg total) by mouth 2 (two) times daily.  Dispense: 60 tablet; Refill: 5        Risks, benefits, and side effects were discussed with the patient. All questions were answered to the fullest satisfaction of the patient, and pt verbalized understanding and agreement to treatment plan. Pt was to call with any new or worsening symptoms, or present to the ER.

## 2023-08-16 DIAGNOSIS — E11.9 TYPE 2 DIABETES MELLITUS WITHOUT COMPLICATION: ICD-10-CM

## 2023-08-21 ENCOUNTER — PATIENT MESSAGE (OUTPATIENT)
Dept: ADMINISTRATIVE | Facility: HOSPITAL | Age: 73
End: 2023-08-21
Payer: MEDICARE

## 2023-08-22 ENCOUNTER — PATIENT OUTREACH (OUTPATIENT)
Dept: ADMINISTRATIVE | Facility: HOSPITAL | Age: 73
End: 2023-08-22
Payer: MEDICARE

## 2023-08-22 LAB
LEFT EYE DM RETINOPATHY: NEGATIVE
RIGHT EYE DM RETINOPATHY: NEGATIVE

## 2023-11-09 DIAGNOSIS — E11.9 TYPE 2 DIABETES MELLITUS WITHOUT COMPLICATION: ICD-10-CM

## 2023-11-14 ENCOUNTER — OFFICE VISIT (OUTPATIENT)
Dept: FAMILY MEDICINE | Facility: CLINIC | Age: 73
End: 2023-11-14
Payer: MEDICARE

## 2023-11-14 ENCOUNTER — LAB VISIT (OUTPATIENT)
Dept: LAB | Facility: HOSPITAL | Age: 73
End: 2023-11-14
Attending: NURSE PRACTITIONER
Payer: MEDICARE

## 2023-11-14 VITALS
HEIGHT: 62 IN | WEIGHT: 175.63 LBS | OXYGEN SATURATION: 96 % | HEART RATE: 73 BPM | RESPIRATION RATE: 18 BRPM | DIASTOLIC BLOOD PRESSURE: 72 MMHG | SYSTOLIC BLOOD PRESSURE: 112 MMHG | BODY MASS INDEX: 32.32 KG/M2

## 2023-11-14 DIAGNOSIS — E78.5 TYPE 2 DIABETES MELLITUS WITH HYPERLIPIDEMIA: ICD-10-CM

## 2023-11-14 DIAGNOSIS — E11.69 TYPE 2 DIABETES MELLITUS WITH HYPERLIPIDEMIA: Primary | ICD-10-CM

## 2023-11-14 DIAGNOSIS — E78.5 TYPE 2 DIABETES MELLITUS WITH HYPERLIPIDEMIA: Primary | ICD-10-CM

## 2023-11-14 DIAGNOSIS — F41.9 ANXIETY AND DEPRESSION: ICD-10-CM

## 2023-11-14 DIAGNOSIS — R09.82 PND (POST-NASAL DRIP): ICD-10-CM

## 2023-11-14 DIAGNOSIS — J30.2 SEASONAL ALLERGIES: ICD-10-CM

## 2023-11-14 DIAGNOSIS — M79.2 NERVE PAIN: ICD-10-CM

## 2023-11-14 DIAGNOSIS — I10 ESSENTIAL HYPERTENSION: ICD-10-CM

## 2023-11-14 DIAGNOSIS — F32.A ANXIETY AND DEPRESSION: ICD-10-CM

## 2023-11-14 DIAGNOSIS — E11.69 TYPE 2 DIABETES MELLITUS WITH HYPERLIPIDEMIA: ICD-10-CM

## 2023-11-14 DIAGNOSIS — R06.2 WHEEZES: ICD-10-CM

## 2023-11-14 DIAGNOSIS — F51.01 PRIMARY INSOMNIA: ICD-10-CM

## 2023-11-14 DIAGNOSIS — M79.641 RIGHT HAND PAIN: ICD-10-CM

## 2023-11-14 DIAGNOSIS — Z23 NEED FOR VACCINATION FOR H FLU TYPE B: ICD-10-CM

## 2023-11-14 LAB
ALBUMIN SERPL BCP-MCNC: 4.2 G/DL (ref 3.5–5.2)
ALP SERPL-CCNC: 105 U/L (ref 55–135)
ALT SERPL W/O P-5'-P-CCNC: 15 U/L (ref 10–44)
ANION GAP SERPL CALC-SCNC: 14 MMOL/L (ref 8–16)
AST SERPL-CCNC: 21 U/L (ref 10–40)
BILIRUB SERPL-MCNC: 0.3 MG/DL (ref 0.1–1)
BUN SERPL-MCNC: 11 MG/DL (ref 8–23)
CALCIUM SERPL-MCNC: 10.1 MG/DL (ref 8.7–10.5)
CHLORIDE SERPL-SCNC: 101 MMOL/L (ref 95–110)
CO2 SERPL-SCNC: 23 MMOL/L (ref 23–29)
CREAT SERPL-MCNC: 0.9 MG/DL (ref 0.5–1.4)
EST. GFR  (NO RACE VARIABLE): >60 ML/MIN/1.73 M^2
ESTIMATED AVG GLUCOSE: 206 MG/DL (ref 68–131)
GLUCOSE SERPL-MCNC: 142 MG/DL (ref 70–110)
HBA1C MFR BLD: 8.8 % (ref 4–5.6)
POTASSIUM SERPL-SCNC: 3.4 MMOL/L (ref 3.5–5.1)
PROT SERPL-MCNC: 8.6 G/DL (ref 6–8.4)
SODIUM SERPL-SCNC: 138 MMOL/L (ref 136–145)

## 2023-11-14 PROCEDURE — 3052F HG A1C>EQUAL 8.0%<EQUAL 9.0%: CPT | Mod: CPTII,S$GLB,, | Performed by: NURSE PRACTITIONER

## 2023-11-14 PROCEDURE — G0008 ADMIN INFLUENZA VIRUS VAC: HCPCS | Mod: S$GLB,,, | Performed by: NURSE PRACTITIONER

## 2023-11-14 PROCEDURE — 90694 VACC AIIV4 NO PRSRV 0.5ML IM: CPT | Mod: S$GLB,,, | Performed by: NURSE PRACTITIONER

## 2023-11-14 PROCEDURE — 3066F NEPHROPATHY DOC TX: CPT | Mod: CPTII,S$GLB,, | Performed by: NURSE PRACTITIONER

## 2023-11-14 PROCEDURE — 3288F FALL RISK ASSESSMENT DOCD: CPT | Mod: CPTII,S$GLB,, | Performed by: NURSE PRACTITIONER

## 2023-11-14 PROCEDURE — 2023F PR DILATED RETINAL EXAM W/O EVID OF RETINOPATHY: ICD-10-PCS | Mod: CPTII,S$GLB,, | Performed by: NURSE PRACTITIONER

## 2023-11-14 PROCEDURE — 3061F PR NEG MICROALBUMINURIA RESULT DOCUMENTED/REVIEW: ICD-10-PCS | Mod: CPTII,S$GLB,, | Performed by: NURSE PRACTITIONER

## 2023-11-14 PROCEDURE — 3066F PR DOCUMENTATION OF TREATMENT FOR NEPHROPATHY: ICD-10-PCS | Mod: CPTII,S$GLB,, | Performed by: NURSE PRACTITIONER

## 2023-11-14 PROCEDURE — 1157F PR ADVANCE CARE PLAN OR EQUIV PRESENT IN MEDICAL RECORD: ICD-10-PCS | Mod: CPTII,S$GLB,, | Performed by: NURSE PRACTITIONER

## 2023-11-14 PROCEDURE — 83036 HEMOGLOBIN GLYCOSYLATED A1C: CPT | Performed by: NURSE PRACTITIONER

## 2023-11-14 PROCEDURE — 1101F PR PT FALLS ASSESS DOC 0-1 FALLS W/OUT INJ PAST YR: ICD-10-PCS | Mod: CPTII,S$GLB,, | Performed by: NURSE PRACTITIONER

## 2023-11-14 PROCEDURE — 3061F NEG MICROALBUMINURIA REV: CPT | Mod: CPTII,S$GLB,, | Performed by: NURSE PRACTITIONER

## 2023-11-14 PROCEDURE — 3074F SYST BP LT 130 MM HG: CPT | Mod: CPTII,S$GLB,, | Performed by: NURSE PRACTITIONER

## 2023-11-14 PROCEDURE — 90694 FLU VACCINE - QUADRIVALENT - ADJUVANTED: ICD-10-PCS | Mod: S$GLB,,, | Performed by: NURSE PRACTITIONER

## 2023-11-14 PROCEDURE — 1159F MED LIST DOCD IN RCRD: CPT | Mod: CPTII,S$GLB,, | Performed by: NURSE PRACTITIONER

## 2023-11-14 PROCEDURE — 3288F PR FALLS RISK ASSESSMENT DOCUMENTED: ICD-10-PCS | Mod: CPTII,S$GLB,, | Performed by: NURSE PRACTITIONER

## 2023-11-14 PROCEDURE — 36415 COLL VENOUS BLD VENIPUNCTURE: CPT | Performed by: NURSE PRACTITIONER

## 2023-11-14 PROCEDURE — 2023F DILAT RTA XM W/O RTNOPTHY: CPT | Mod: CPTII,S$GLB,, | Performed by: NURSE PRACTITIONER

## 2023-11-14 PROCEDURE — 99999 PR PBB SHADOW E&M-EST. PATIENT-LVL V: ICD-10-PCS | Mod: PBBFAC,,, | Performed by: NURSE PRACTITIONER

## 2023-11-14 PROCEDURE — 1157F ADVNC CARE PLAN IN RCRD: CPT | Mod: CPTII,S$GLB,, | Performed by: NURSE PRACTITIONER

## 2023-11-14 PROCEDURE — 99214 PR OFFICE/OUTPT VISIT, EST, LEVL IV, 30-39 MIN: ICD-10-PCS | Mod: 25,S$GLB,, | Performed by: NURSE PRACTITIONER

## 2023-11-14 PROCEDURE — 3008F BODY MASS INDEX DOCD: CPT | Mod: CPTII,S$GLB,, | Performed by: NURSE PRACTITIONER

## 2023-11-14 PROCEDURE — 1101F PT FALLS ASSESS-DOCD LE1/YR: CPT | Mod: CPTII,S$GLB,, | Performed by: NURSE PRACTITIONER

## 2023-11-14 PROCEDURE — 1125F PR PAIN SEVERITY QUANTIFIED, PAIN PRESENT: ICD-10-PCS | Mod: CPTII,S$GLB,, | Performed by: NURSE PRACTITIONER

## 2023-11-14 PROCEDURE — 99214 OFFICE O/P EST MOD 30 MIN: CPT | Mod: 25,S$GLB,, | Performed by: NURSE PRACTITIONER

## 2023-11-14 PROCEDURE — 80053 COMPREHEN METABOLIC PANEL: CPT | Performed by: NURSE PRACTITIONER

## 2023-11-14 PROCEDURE — 3078F DIAST BP <80 MM HG: CPT | Mod: CPTII,S$GLB,, | Performed by: NURSE PRACTITIONER

## 2023-11-14 PROCEDURE — G0008 FLU VACCINE - QUADRIVALENT - ADJUVANTED: ICD-10-PCS | Mod: S$GLB,,, | Performed by: NURSE PRACTITIONER

## 2023-11-14 PROCEDURE — 99999 PR PBB SHADOW E&M-EST. PATIENT-LVL V: CPT | Mod: PBBFAC,,, | Performed by: NURSE PRACTITIONER

## 2023-11-14 PROCEDURE — 82043 UR ALBUMIN QUANTITATIVE: CPT | Performed by: NURSE PRACTITIONER

## 2023-11-14 PROCEDURE — 1160F RVW MEDS BY RX/DR IN RCRD: CPT | Mod: CPTII,S$GLB,, | Performed by: NURSE PRACTITIONER

## 2023-11-14 PROCEDURE — 3074F PR MOST RECENT SYSTOLIC BLOOD PRESSURE < 130 MM HG: ICD-10-PCS | Mod: CPTII,S$GLB,, | Performed by: NURSE PRACTITIONER

## 2023-11-14 PROCEDURE — 1160F PR REVIEW ALL MEDS BY PRESCRIBER/CLIN PHARMACIST DOCUMENTED: ICD-10-PCS | Mod: CPTII,S$GLB,, | Performed by: NURSE PRACTITIONER

## 2023-11-14 PROCEDURE — 3052F PR MOST RECENT HEMOGLOBIN A1C LEVEL 8.0 - < 9.0%: ICD-10-PCS | Mod: CPTII,S$GLB,, | Performed by: NURSE PRACTITIONER

## 2023-11-14 PROCEDURE — 3078F PR MOST RECENT DIASTOLIC BLOOD PRESSURE < 80 MM HG: ICD-10-PCS | Mod: CPTII,S$GLB,, | Performed by: NURSE PRACTITIONER

## 2023-11-14 PROCEDURE — 1159F PR MEDICATION LIST DOCUMENTED IN MEDICAL RECORD: ICD-10-PCS | Mod: CPTII,S$GLB,, | Performed by: NURSE PRACTITIONER

## 2023-11-14 PROCEDURE — 1125F AMNT PAIN NOTED PAIN PRSNT: CPT | Mod: CPTII,S$GLB,, | Performed by: NURSE PRACTITIONER

## 2023-11-14 PROCEDURE — 3008F PR BODY MASS INDEX (BMI) DOCUMENTED: ICD-10-PCS | Mod: CPTII,S$GLB,, | Performed by: NURSE PRACTITIONER

## 2023-11-14 RX ORDER — SIMVASTATIN 20 MG/1
20 TABLET, FILM COATED ORAL NIGHTLY
Qty: 90 TABLET | Refills: 3 | Status: SHIPPED | OUTPATIENT
Start: 2023-11-14

## 2023-11-14 RX ORDER — CETIRIZINE HYDROCHLORIDE 10 MG/1
10 TABLET ORAL DAILY PRN
Qty: 90 TABLET | Refills: 3 | Status: SHIPPED | OUTPATIENT
Start: 2023-11-14

## 2023-11-14 RX ORDER — ALBUTEROL SULFATE 90 UG/1
2 AEROSOL, METERED RESPIRATORY (INHALATION) EVERY 4 HOURS PRN
Qty: 18 G | Refills: 11 | Status: SHIPPED | OUTPATIENT
Start: 2023-11-14

## 2023-11-14 RX ORDER — LIDOCAINE HYDROCHLORIDE 20 MG/ML
SOLUTION OROPHARYNGEAL
COMMUNITY
Start: 2023-09-19 | End: 2023-11-14

## 2023-11-14 RX ORDER — PAROXETINE 10 MG/1
10 TABLET, FILM COATED ORAL DAILY
Qty: 90 TABLET | Refills: 3 | Status: SHIPPED | OUTPATIENT
Start: 2023-11-14 | End: 2024-03-18

## 2023-11-14 RX ORDER — DOXYCYCLINE 100 MG/1
100 CAPSULE ORAL 2 TIMES DAILY
COMMUNITY
Start: 2023-10-06 | End: 2023-11-14

## 2023-11-14 RX ORDER — LOSARTAN POTASSIUM AND HYDROCHLOROTHIAZIDE 25; 100 MG/1; MG/1
1 TABLET ORAL EVERY MORNING
Qty: 90 TABLET | Refills: 3 | Status: SHIPPED | OUTPATIENT
Start: 2023-11-14

## 2023-11-14 RX ORDER — AZELASTINE 1 MG/ML
1 SPRAY, METERED NASAL 2 TIMES DAILY PRN
Qty: 30 ML | Refills: 11 | Status: SHIPPED | OUTPATIENT
Start: 2023-11-14

## 2023-11-14 RX ORDER — ESCITALOPRAM OXALATE 5 MG/1
5 TABLET ORAL
COMMUNITY
Start: 2023-09-06 | End: 2023-11-14

## 2023-11-14 RX ORDER — TRAZODONE HYDROCHLORIDE 50 MG/1
50 TABLET ORAL NIGHTLY PRN
Qty: 90 TABLET | Refills: 3 | Status: SHIPPED | OUTPATIENT
Start: 2023-11-14 | End: 2024-04-03 | Stop reason: SDUPTHER

## 2023-11-14 RX ORDER — PREGABALIN 75 MG/1
75 CAPSULE ORAL 2 TIMES DAILY
Qty: 180 CAPSULE | Refills: 1 | Status: SHIPPED | OUTPATIENT
Start: 2023-11-14

## 2023-11-14 RX ORDER — METOPROLOL TARTRATE 50 MG/1
50 TABLET ORAL 2 TIMES DAILY
Qty: 180 TABLET | Refills: 3 | Status: SHIPPED | OUTPATIENT
Start: 2023-11-14 | End: 2024-04-03

## 2023-11-14 RX ORDER — FLUTICASONE PROPIONATE 50 MCG
2 SPRAY, SUSPENSION (ML) NASAL DAILY PRN
Qty: 15.8 ML | Refills: 11 | Status: SHIPPED | OUTPATIENT
Start: 2023-11-14

## 2023-11-14 RX ORDER — MONTELUKAST SODIUM 10 MG/1
10 TABLET ORAL NIGHTLY
Qty: 30 TABLET | Refills: 5 | Status: SHIPPED | OUTPATIENT
Start: 2023-11-14 | End: 2024-04-03

## 2023-11-14 NOTE — PROGRESS NOTES
Subjective:       Patient ID: Argelia Pyle is a 73 y.o. female.    Chief Complaint: Follow-up (Sinus problems), Sinus Problem (Ear pain), and Hand Pain (Right Hand: X couple of weeks ago)    -  The pt is a 72 y/o female that presents for DM follow up. At last visit 8/14/23 granddaughter agreed to help prepare a pill planner which she has been doing since. Her A1c 9.1 7/17/23.     HTN well controlled and reports actually feeling better since she has been taking her medications.     C/o PND since her sinus infection about 3 weeks ago that will not resolve despite using Flonase and Astelin. Has even been somewhat SOB using her albuterol inh more.   -  Past Medical History:   Diagnosis Date    Anxiety     Arthritis     Chronic sinusitis     Diabetes mellitus     Hypertension        Past Surgical History:   Procedure Laterality Date    BREAST BIOPSY      BREAST LUMPECTOMY Left     BUNIONECTOMY Bilateral     COLONOSCOPY      ESOPHAGOGASTRODUODENOSCOPY      ETHMOIDECTOMY Bilateral 2/7/2019    Procedure: ETHMOIDECTOMY;  Surgeon: Michael Grubbs MD;  Location: [facility] OR;  Service: ENT;  Laterality: Bilateral;  endoscopic anterior and posterior    FUNCTIONAL ENDOSCOPIC SINUS SURGERY (FESS) N/A 2/7/2019    Procedure: FESS (FUNCTIONAL ENDOSCOPIC SINUS SURGERY);  Surgeon: Michael Grubbs MD;  Location: [facility] OR;  Service: ENT;  Laterality: N/A;    FUNCTIONAL ENDOSCOPIC SINUS SURGERY (FESS) Bilateral 12/19/2019    Procedure: FESS (FUNCTIONAL ENDOSCOPIC SINUS SURGERY);  Surgeon: Michael Grubbs MD;  Location: [facility] OR;  Service: ENT;  Laterality: Bilateral;    MAXILLARY ANTROSTOMY Bilateral 2/7/2019    Procedure: MAXILLARY ANTROSTOMY;  Surgeon: Michael Grubbs MD;  Location: [facility] OR;  Service: ENT;  Laterality: Bilateral;    NASAL SEPTOPLASTY Bilateral 2/7/2019    Procedure: SEPTOPLASTY, NOSE;  Surgeon: Michael Grubbs MD;  Location: [facility] OR;  Service: ENT;  Laterality: Bilateral;    NASAL TURBINATE REDUCTION  Bilateral 2019    Procedure: REDUCTION, NASAL TURBINATE;  Surgeon: Michael Grubbs MD;  Location: [facility] OR;  Service: ENT;  Laterality: Bilateral;    SINUS SURGERY      x4    TUBAL LIGATION      WRIST SURGERY Left         Social History     Socioeconomic History    Marital status:    Tobacco Use    Smoking status: Former     Current packs/day: 0.00     Average packs/day: 0.8 packs/day for 25.0 years (18.8 ttl pk-yrs)     Types: Cigarettes     Start date: 1974     Quit date: 1999     Years since quittin.6    Smokeless tobacco: Never   Substance and Sexual Activity    Alcohol use: Yes     Comment: social    Drug use: No    Sexual activity: Not Currently     Birth control/protection: See Surgical Hx       Family History   Problem Relation Age of Onset    Breast cancer Neg Hx        Review of patient's allergies indicates:   Allergen Reactions    Cefuroxime Rash    Ketorolac tromethamine Other (See Comments)     Other reaction(s): Burning    Moxifloxacin Other (See Comments)     Heart racing.    Codeine Itching    Famotidine-ca carb-mag hydrox Hives    Prevacid [lansoprazole] Itching          Current Outpatient Medications:     amLODIPine (NORVASC) 10 MG tablet, Take 1 tablet (10 mg total) by mouth every evening., Disp: 90 tablet, Rfl: 1    aspirin (ECOTRIN) 81 MG EC tablet, Take 81 mg by mouth every evening., Disp: , Rfl:     blood sugar diagnostic Strp, To check BG 1 time daily, to use with insurance preferred meter, Disp: 200 strip, Rfl: 11    blood-glucose meter kit, To check BG 1 time daily, to use with insurance preferred meter, Disp: 1 each, Rfl: 0    busPIRone (BUSPAR) 15 MG tablet, Take 1 tablet (15 mg total) by mouth 2 (two) times daily., Disp: 60 tablet, Rfl: 5    cyclobenzaprine (FLEXERIL) 5 MG tablet, Take 1 tablet (5 mg total) by mouth 3 (three) times daily as needed for Muscle spasms., Disp: 30 tablet, Rfl: 4    diclofenac sodium (VOLTAREN) 1 % Gel, APPLY TOPICALLY TO THE  AFFECTED AREA FOUR TIMES DAILY, Disp: , Rfl:     empagliflozin (JARDIANCE) 25 mg tablet, Take 1 tablet (25 mg total) by mouth once daily., Disp: 30 tablet, Rfl: 5    ibuprofen (ADVIL,MOTRIN) 800 MG tablet, Take 1 tablet (800 mg total) by mouth 2 (two) times daily as needed for Pain., Disp: 60 tablet, Rfl: 5    lancets Misc, To check BG 1 time daily, to use with insurance preferred meter, Disp: 200 each, Rfl: 0    metFORMIN (GLUCOPHAGE) 1000 MG tablet, Take 1,000 mg by mouth 2 (two) times daily., Disp: , Rfl:     pantoprazole (PROTONIX) 20 MG tablet, Take 1 tablet (20 mg total) by mouth once daily., Disp: 30 tablet, Rfl: 5    albuterol (PROVENTIL/VENTOLIN HFA) 90 mcg/actuation inhaler, Inhale 2 puffs into the lungs every 4 (four) hours as needed for Wheezing or Shortness of Breath., Disp: 18 g, Rfl: 11    azelastine (ASTELIN) 137 mcg (0.1 %) nasal spray, 1 spray (137 mcg total) by Nasal route 2 (two) times daily as needed for Rhinitis., Disp: 30 mL, Rfl: 11    cetirizine (ZYRTEC) 10 MG tablet, Take 1 tablet (10 mg total) by mouth daily as needed for Allergies., Disp: 90 tablet, Rfl: 3    fluticasone propionate (FLONASE) 50 mcg/actuation nasal spray, 2 sprays (100 mcg total) by Each Nostril route daily as needed for Rhinitis., Disp: 15.8 mL, Rfl: 11    losartan-hydrochlorothiazide 100-25 mg (HYZAAR) 100-25 mg per tablet, Take 1 tablet by mouth every morning., Disp: 90 tablet, Rfl: 3    metoprolol tartrate (LOPRESSOR) 50 MG tablet, Take 1 tablet (50 mg total) by mouth 2 (two) times daily., Disp: 180 tablet, Rfl: 3    montelukast (SINGULAIR) 10 mg tablet, Take 1 tablet (10 mg total) by mouth every evening., Disp: 30 tablet, Rfl: 5    paroxetine (PAXIL) 10 MG tablet, Take 1 tablet (10 mg total) by mouth once daily., Disp: 90 tablet, Rfl: 3    pregabalin (LYRICA) 75 MG capsule, Take 1 capsule (75 mg total) by mouth 2 (two) times daily., Disp: 180 capsule, Rfl: 1    simvastatin (ZOCOR) 20 MG tablet, Take 1 tablet (20 mg  total) by mouth nightly., Disp: 90 tablet, Rfl: 3    traZODone (DESYREL) 50 MG tablet, Take 1 tablet (50 mg total) by mouth nightly as needed for Insomnia., Disp: 90 tablet, Rfl: 3    Follow-up    Sinus Problem    Hand Pain       Review of Systems   Constitutional: Negative.    HENT: Negative.     Eyes: Negative.    Respiratory: Negative.     Cardiovascular: Negative.    Gastrointestinal: Negative.    Endocrine: Negative.    Genitourinary: Negative.    Musculoskeletal:         Right middle finger wont bend and hurts   Skin: Negative.    Allergic/Immunologic: Negative.    Neurological: Negative.    Hematological: Negative.    Psychiatric/Behavioral: Negative.         Objective:      Physical Exam  Vitals and nursing note reviewed.   Constitutional:       General: She is not in acute distress.     Appearance: Normal appearance. She is well-developed. She is obese. She is not ill-appearing.   HENT:      Head: Normocephalic.   Eyes:      Conjunctiva/sclera: Conjunctivae normal.   Neck:      Thyroid: No thyromegaly.   Cardiovascular:      Rate and Rhythm: Normal rate and regular rhythm.      Heart sounds: Normal heart sounds. No murmur heard.  Pulmonary:      Effort: Pulmonary effort is normal.      Breath sounds: Normal breath sounds. No wheezing or rales.   Musculoskeletal:         General: Normal range of motion.      Cervical back: Normal range of motion.      Right lower leg: No edema.      Left lower leg: No edema.      Comments: Decreased ROM of right index and middle finger   Skin:     General: Skin is warm and dry.   Neurological:      Mental Status: She is alert and oriented to person, place, and time. Mental status is at baseline.   Psychiatric:         Mood and Affect: Mood normal.         Behavior: Behavior normal.         Thought Content: Thought content normal.         Judgment: Judgment normal.         Assessment:       1. Type 2 diabetes mellitus with hyperlipidemia    2. PND (post-nasal drip)    3. Need  for vaccination for H flu type B    4. Wheezes    5. Seasonal allergies    6. Essential hypertension    7. Nerve pain    8. Primary insomnia    9. Anxiety and depression        Plan:     1- NF labs and microalbumin in lab today  2- start singular  3- flu vac today  4- stop lexapro d/t duplicate therapy  5- RTC 3 mo  6- schedule mammo  7- refer to ortho  -    Type 2 diabetes mellitus with hyperlipidemia  -     Hemoglobin A1C; Future; Expected date: 11/14/2023  -     Comprehensive Metabolic Panel; Future; Expected date: 11/14/2023  -     Microalbumin/Creatinine Ratio, Urine; Future; Expected date: 11/15/2023  -     pregabalin (LYRICA) 75 MG capsule; Take 1 capsule (75 mg total) by mouth 2 (two) times daily.  Dispense: 180 capsule; Refill: 1  -     simvastatin (ZOCOR) 20 MG tablet; Take 1 tablet (20 mg total) by mouth nightly.  Dispense: 90 tablet; Refill: 3    PND (post-nasal drip)  -     montelukast (SINGULAIR) 10 mg tablet; Take 1 tablet (10 mg total) by mouth every evening.  Dispense: 30 tablet; Refill: 5  -     albuterol (PROVENTIL/VENTOLIN HFA) 90 mcg/actuation inhaler; Inhale 2 puffs into the lungs every 4 (four) hours as needed for Wheezing or Shortness of Breath.  Dispense: 18 g; Refill: 11    Need for vaccination for H flu type B  -     Influenza (FLUAD) - Quadrivalent (Adjuvanted) *Preferred* (65+) (PF)    Wheezes  -     albuterol (PROVENTIL/VENTOLIN HFA) 90 mcg/actuation inhaler; Inhale 2 puffs into the lungs every 4 (four) hours as needed for Wheezing or Shortness of Breath.  Dispense: 18 g; Refill: 11    Seasonal allergies  -     azelastine (ASTELIN) 137 mcg (0.1 %) nasal spray; 1 spray (137 mcg total) by Nasal route 2 (two) times daily as needed for Rhinitis.  Dispense: 30 mL; Refill: 11  -     cetirizine (ZYRTEC) 10 MG tablet; Take 1 tablet (10 mg total) by mouth daily as needed for Allergies.  Dispense: 90 tablet; Refill: 3  -     fluticasone propionate (FLONASE) 50 mcg/actuation nasal spray; 2 sprays  (100 mcg total) by Each Nostril route daily as needed for Rhinitis.  Dispense: 15.8 mL; Refill: 11    Essential hypertension  -     losartan-hydrochlorothiazide 100-25 mg (HYZAAR) 100-25 mg per tablet; Take 1 tablet by mouth every morning.  Dispense: 90 tablet; Refill: 3  -     metoprolol tartrate (LOPRESSOR) 50 MG tablet; Take 1 tablet (50 mg total) by mouth 2 (two) times daily.  Dispense: 180 tablet; Refill: 3    Nerve pain  -     pregabalin (LYRICA) 75 MG capsule; Take 1 capsule (75 mg total) by mouth 2 (two) times daily.  Dispense: 180 capsule; Refill: 1    Primary insomnia  -     traZODone (DESYREL) 50 MG tablet; Take 1 tablet (50 mg total) by mouth nightly as needed for Insomnia.  Dispense: 90 tablet; Refill: 3    Anxiety and depression  -     paroxetine (PAXIL) 10 MG tablet; Take 1 tablet (10 mg total) by mouth once daily.  Dispense: 90 tablet; Refill: 3        Risks, benefits, and side effects were discussed with the patient. All questions were answered to the fullest satisfaction of the patient, and pt verbalized understanding and agreement to treatment plan. Pt was to call with any new or worsening symptoms, or present to the ER.

## 2023-11-15 LAB
ALBUMIN/CREAT UR: 6.5 UG/MG (ref 0–30)
CREAT UR-MCNC: 108 MG/DL (ref 15–325)
MICROALBUMIN UR DL<=1MG/L-MCNC: 7 UG/ML

## 2023-11-19 ENCOUNTER — PATIENT MESSAGE (OUTPATIENT)
Dept: FAMILY MEDICINE | Facility: CLINIC | Age: 73
End: 2023-11-19
Payer: MEDICAID

## 2023-11-19 DIAGNOSIS — E78.5 TYPE 2 DIABETES MELLITUS WITH HYPERLIPIDEMIA: Primary | ICD-10-CM

## 2023-11-19 DIAGNOSIS — E11.69 TYPE 2 DIABETES MELLITUS WITH HYPERLIPIDEMIA: Primary | ICD-10-CM

## 2023-11-19 RX ORDER — GLIPIZIDE 10 MG/1
10 TABLET ORAL
Qty: 180 TABLET | Refills: 3 | Status: SHIPPED | OUTPATIENT
Start: 2023-11-19 | End: 2024-11-18

## 2023-11-20 ENCOUNTER — TELEPHONE (OUTPATIENT)
Dept: FAMILY MEDICINE | Facility: CLINIC | Age: 73
End: 2023-11-20
Payer: MEDICAID

## 2023-11-20 DIAGNOSIS — M79.641 PAIN OF RIGHT HAND: Primary | ICD-10-CM

## 2023-11-20 NOTE — TELEPHONE ENCOUNTER
----- Message from Sonya Blackman NP sent at 11/19/2023  8:27 PM CST -----  Please call granddaughter Miller:  Hi, your kidneys, liver, and electrolytes were all essentially normal and look good. Urine test for diabetes was normal. Diabetes has mildly improved so I have sent glipizide for you to take with your metformin twice daily to improve your diabetes. Please see me in 3 months with an A1c to be done prior to appt. Keep using your pill planner and let your granddaughter fill it for you.  Thanks, Blanca

## 2023-12-01 ENCOUNTER — HOSPITAL ENCOUNTER (EMERGENCY)
Facility: HOSPITAL | Age: 73
Discharge: HOME | End: 2023-12-01
Attending: EMERGENCY MEDICINE
Payer: MEDICARE

## 2023-12-01 VITALS
RESPIRATION RATE: 16 BRPM | WEIGHT: 182 LBS | HEIGHT: 62 IN | TEMPERATURE: 99 F | SYSTOLIC BLOOD PRESSURE: 153 MMHG | BODY MASS INDEX: 33.49 KG/M2 | HEART RATE: 74 BPM | OXYGEN SATURATION: 98 % | DIASTOLIC BLOOD PRESSURE: 71 MMHG

## 2023-12-01 DIAGNOSIS — J34.0 NASAL ABSCESS: Primary | ICD-10-CM

## 2023-12-01 LAB
ALBUMIN SERPL BCP-MCNC: 3.6 G/DL (ref 3.5–5.2)
ALP SERPL-CCNC: 95 U/L (ref 55–135)
ALT SERPL W/O P-5'-P-CCNC: 11 U/L (ref 10–44)
ANION GAP SERPL CALC-SCNC: 11 MMOL/L (ref 8–16)
AST SERPL-CCNC: 12 U/L (ref 10–40)
BASOPHILS # BLD AUTO: 0.05 K/UL (ref 0–0.2)
BASOPHILS NFR BLD: 0.5 % (ref 0–1.9)
BILIRUB SERPL-MCNC: 0.1 MG/DL (ref 0.1–1)
BUN SERPL-MCNC: 16 MG/DL (ref 8–23)
CALCIUM SERPL-MCNC: 9.2 MG/DL (ref 8.7–10.5)
CHLORIDE SERPL-SCNC: 99 MMOL/L (ref 95–110)
CO2 SERPL-SCNC: 25 MMOL/L (ref 23–29)
CREAT SERPL-MCNC: 0.9 MG/DL (ref 0.5–1.4)
CRP SERPL-MCNC: 5.3 MG/L (ref 0–8.2)
DIFFERENTIAL METHOD: ABNORMAL
EOSINOPHIL # BLD AUTO: 0.2 K/UL (ref 0–0.5)
EOSINOPHIL NFR BLD: 1.8 % (ref 0–8)
ERYTHROCYTE [DISTWIDTH] IN BLOOD BY AUTOMATED COUNT: 16.8 % (ref 11.5–14.5)
ERYTHROCYTE [SEDIMENTATION RATE] IN BLOOD BY WESTERGREN METHOD: 35 MM/HR (ref 0–20)
EST. GFR  (NO RACE VARIABLE): >60 ML/MIN/1.73 M^2
GLUCOSE SERPL-MCNC: 218 MG/DL (ref 70–110)
HCT VFR BLD AUTO: 32.6 % (ref 37–48.5)
HGB BLD-MCNC: 10.2 G/DL (ref 12–16)
IMM GRANULOCYTES # BLD AUTO: 0.03 K/UL (ref 0–0.04)
IMM GRANULOCYTES NFR BLD AUTO: 0.3 % (ref 0–0.5)
LYMPHOCYTES # BLD AUTO: 3.8 K/UL (ref 1–4.8)
LYMPHOCYTES NFR BLD: 35.1 % (ref 18–48)
MCH RBC QN AUTO: 21.5 PG (ref 27–31)
MCHC RBC AUTO-ENTMCNC: 31.3 G/DL (ref 32–36)
MCV RBC AUTO: 69 FL (ref 82–98)
MONOCYTES # BLD AUTO: 1.3 K/UL (ref 0.3–1)
MONOCYTES NFR BLD: 11.6 % (ref 4–15)
NEUTROPHILS # BLD AUTO: 5.5 K/UL (ref 1.8–7.7)
NEUTROPHILS NFR BLD: 50.7 % (ref 38–73)
NRBC BLD-RTO: 0 /100 WBC
PLATELET # BLD AUTO: 292 K/UL (ref 150–450)
PMV BLD AUTO: 9.4 FL (ref 9.2–12.9)
POTASSIUM SERPL-SCNC: 3.6 MMOL/L (ref 3.5–5.1)
PROT SERPL-MCNC: 8 G/DL (ref 6–8.4)
RBC # BLD AUTO: 4.74 M/UL (ref 4–5.4)
SODIUM SERPL-SCNC: 135 MMOL/L (ref 136–145)
WBC # BLD AUTO: 10.83 K/UL (ref 3.9–12.7)

## 2023-12-01 PROCEDURE — 70487 CT MAXILLOFACIAL WITH CONTRAST: ICD-10-PCS | Mod: 26,,, | Performed by: RADIOLOGY

## 2023-12-01 PROCEDURE — 96374 THER/PROPH/DIAG INJ IV PUSH: CPT | Mod: 59

## 2023-12-01 PROCEDURE — 25500020 PHARM REV CODE 255: Performed by: EMERGENCY MEDICINE

## 2023-12-01 PROCEDURE — 85025 COMPLETE CBC W/AUTO DIFF WBC: CPT | Performed by: EMERGENCY MEDICINE

## 2023-12-01 PROCEDURE — 85651 RBC SED RATE NONAUTOMATED: CPT | Performed by: EMERGENCY MEDICINE

## 2023-12-01 PROCEDURE — 99285 EMERGENCY DEPT VISIT HI MDM: CPT | Mod: 25

## 2023-12-01 PROCEDURE — 80053 COMPREHEN METABOLIC PANEL: CPT | Performed by: EMERGENCY MEDICINE

## 2023-12-01 PROCEDURE — 70487 CT MAXILLOFACIAL W/DYE: CPT | Mod: TC

## 2023-12-01 PROCEDURE — 70487 CT MAXILLOFACIAL W/DYE: CPT | Mod: 26,,, | Performed by: RADIOLOGY

## 2023-12-01 PROCEDURE — 96375 TX/PRO/DX INJ NEW DRUG ADDON: CPT

## 2023-12-01 PROCEDURE — 25000003 PHARM REV CODE 250: Performed by: EMERGENCY MEDICINE

## 2023-12-01 PROCEDURE — 63600175 PHARM REV CODE 636 W HCPCS: Performed by: EMERGENCY MEDICINE

## 2023-12-01 PROCEDURE — 86140 C-REACTIVE PROTEIN: CPT | Performed by: EMERGENCY MEDICINE

## 2023-12-01 RX ORDER — ACETAMINOPHEN 500 MG
1000 TABLET ORAL
Status: COMPLETED | OUTPATIENT
Start: 2023-12-01 | End: 2023-12-01

## 2023-12-01 RX ORDER — MORPHINE SULFATE 2 MG/ML
2 INJECTION, SOLUTION INTRAMUSCULAR; INTRAVENOUS
Status: COMPLETED | OUTPATIENT
Start: 2023-12-01 | End: 2023-12-01

## 2023-12-01 RX ORDER — DOXYCYCLINE 100 MG/1
100 CAPSULE ORAL 2 TIMES DAILY
Qty: 20 CAPSULE | Refills: 0 | Status: SHIPPED | OUTPATIENT
Start: 2023-12-01 | End: 2023-12-11

## 2023-12-01 RX ORDER — ONDANSETRON 2 MG/ML
4 INJECTION INTRAMUSCULAR; INTRAVENOUS
Status: COMPLETED | OUTPATIENT
Start: 2023-12-01 | End: 2023-12-01

## 2023-12-01 RX ORDER — DOXYCYCLINE HYCLATE 100 MG
100 TABLET ORAL
Status: COMPLETED | OUTPATIENT
Start: 2023-12-01 | End: 2023-12-01

## 2023-12-01 RX ORDER — DOXYCYCLINE 100 MG/1
100 CAPSULE ORAL 2 TIMES DAILY
Qty: 20 CAPSULE | Refills: 0 | Status: SHIPPED | OUTPATIENT
Start: 2023-12-01 | End: 2023-12-01 | Stop reason: SDUPTHER

## 2023-12-01 RX ORDER — MUPIROCIN 20 MG/G
1 OINTMENT TOPICAL 2 TIMES DAILY
Status: DISCONTINUED | OUTPATIENT
Start: 2023-12-01 | End: 2023-12-01 | Stop reason: HOSPADM

## 2023-12-01 RX ADMIN — MORPHINE SULFATE 2 MG: 2 INJECTION, SOLUTION INTRAMUSCULAR; INTRAVENOUS at 08:12

## 2023-12-01 RX ADMIN — DOXYCYCLINE HYCLATE 100 MG: 100 TABLET ORAL at 08:12

## 2023-12-01 RX ADMIN — ACETAMINOPHEN 1000 MG: 500 TABLET ORAL at 06:12

## 2023-12-01 RX ADMIN — ONDANSETRON 4 MG: 2 INJECTION INTRAMUSCULAR; INTRAVENOUS at 09:12

## 2023-12-01 RX ADMIN — IOHEXOL 75 ML: 350 INJECTION, SOLUTION INTRAVENOUS at 07:12

## 2023-12-01 RX ADMIN — MUPIROCIN 1 TUBE: 20 OINTMENT TOPICAL at 08:12

## 2023-12-02 NOTE — DISCHARGE INSTRUCTIONS
You have been given the information of our ear nose and throat doctor, please call them 1st thing in the morning if you are unable to get a hold of them because it is Saturday please call 1st thing Monday and schedule a follow up appointment.  Please take your antibiotics as prescribed.

## 2023-12-02 NOTE — ED PROVIDER NOTES
Encounter Date: 12/1/2023       History     Chief Complaint   Patient presents with    Facial Pain     Pt reports right nostril is swollen and painful x 3 days      be both states that she woke up 2 days ago with swelling and severe pain to the bridge of her nose.  She is also complaining of ear pain and facial pain.  She is endorsed mild fevers at home.  She denies any trauma.  States it hurts to breathe through her nose.    The history is provided by the patient. No  was used.     Review of patient's allergies indicates:   Allergen Reactions    Cefuroxime Rash    Ketorolac tromethamine Other (See Comments)     Other reaction(s): Burning    Moxifloxacin Other (See Comments)     Heart racing.    Codeine Itching    Famotidine-ca carb-mag hydrox Hives    Prevacid [lansoprazole] Itching     Past Medical History:   Diagnosis Date    Anxiety     Arthritis     Chronic sinusitis     Diabetes mellitus     Hypertension      Past Surgical History:   Procedure Laterality Date    BREAST BIOPSY      BREAST LUMPECTOMY Left     BUNIONECTOMY Bilateral     COLONOSCOPY      ESOPHAGOGASTRODUODENOSCOPY      ETHMOIDECTOMY Bilateral 2/7/2019    Procedure: ETHMOIDECTOMY;  Surgeon: Michael Grubbs MD;  Location: [facility] OR;  Service: ENT;  Laterality: Bilateral;  endoscopic anterior and posterior    FUNCTIONAL ENDOSCOPIC SINUS SURGERY (FESS) N/A 2/7/2019    Procedure: FESS (FUNCTIONAL ENDOSCOPIC SINUS SURGERY);  Surgeon: Michael Grubbs MD;  Location: [facility] OR;  Service: ENT;  Laterality: N/A;    FUNCTIONAL ENDOSCOPIC SINUS SURGERY (FESS) Bilateral 12/19/2019    Procedure: FESS (FUNCTIONAL ENDOSCOPIC SINUS SURGERY);  Surgeon: Michael Grubbs MD;  Location: [facility] OR;  Service: ENT;  Laterality: Bilateral;    MAXILLARY ANTROSTOMY Bilateral 2/7/2019    Procedure: MAXILLARY ANTROSTOMY;  Surgeon: Michael Grubbs MD;  Location: [facility] OR;  Service: ENT;  Laterality: Bilateral;    NASAL SEPTOPLASTY  Bilateral 2019    Procedure: SEPTOPLASTY, NOSE;  Surgeon: Michael Grubbs MD;  Location: [facility] OR;  Service: ENT;  Laterality: Bilateral;    NASAL TURBINATE REDUCTION Bilateral 2019    Procedure: REDUCTION, NASAL TURBINATE;  Surgeon: Michael Grubbs MD;  Location: [facility] OR;  Service: ENT;  Laterality: Bilateral;    SINUS SURGERY      x4    TUBAL LIGATION      WRIST SURGERY Left      Family History   Problem Relation Age of Onset    Breast cancer Neg Hx      Social History     Tobacco Use    Smoking status: Former     Current packs/day: 0.00     Average packs/day: 0.8 packs/day for 25.0 years (18.8 ttl pk-yrs)     Types: Cigarettes     Start date: 1974     Quit date: 1999     Years since quittin.6    Smokeless tobacco: Never   Substance Use Topics    Alcohol use: Yes     Comment: social    Drug use: No     Review of Systems   Constitutional:  Negative for fever.   HENT:  Positive for facial swelling. Negative for sore throat.    Respiratory:  Negative for shortness of breath.    Cardiovascular:  Negative for chest pain.   Gastrointestinal:  Negative for nausea.   Genitourinary:  Negative for dysuria.   Musculoskeletal:  Negative for back pain.   Skin:  Negative for rash.   Neurological:  Negative for weakness.   Hematological:  Does not bruise/bleed easily.   All other systems reviewed and are negative.      Physical Exam     Initial Vitals [23 1740]   BP Pulse Resp Temp SpO2   (!) 153/71 74 20 99.2 °F (37.3 °C) 98 %      MAP       --         Physical Exam    Nursing note and vitals reviewed.  Constitutional: She appears well-developed and well-nourished.   HENT:   Right Ear: External ear normal.   Left Ear: External ear normal.   Mouth/Throat: Oropharynx is clear and moist.   Painful swelling to the bridge of the nose, right slightly worse than left.  Nasal septum appears normal   Eyes: EOM are normal. Pupils are equal, round, and reactive to light.   Neck:   Normal range of  motion.  Cardiovascular:  Normal rate and regular rhythm.           Pulmonary/Chest: Breath sounds normal.   Abdominal: Abdomen is soft.   Musculoskeletal:         General: Normal range of motion.      Cervical back: Normal range of motion.     Neurological: She is alert and oriented to person, place, and time.   Skin: Skin is warm.         ED Course   Procedures  Labs Reviewed   CBC W/ AUTO DIFFERENTIAL - Abnormal; Notable for the following components:       Result Value    Hemoglobin 10.2 (*)     Hematocrit 32.6 (*)     MCV 69 (*)     MCH 21.5 (*)     MCHC 31.3 (*)     RDW 16.8 (*)     Mono # 1.3 (*)     All other components within normal limits   COMPREHENSIVE METABOLIC PANEL - Abnormal; Notable for the following components:    Sodium 135 (*)     Glucose 218 (*)     All other components within normal limits   SEDIMENTATION RATE - Abnormal; Notable for the following components:    Sed Rate 35 (*)     All other components within normal limits   C-REACTIVE PROTEIN          Imaging Results              CT Maxillofacial With Contrast (In process)                      Medications   mupirocin 2 % ointment 1 Tube (1 Tube Nasal Given 12/1/23 2039)   acetaminophen tablet 1,000 mg (1,000 mg Oral Given 12/1/23 1829)   iohexoL (OMNIPAQUE 350) injection 75 mL (75 mLs Intravenous Given 12/1/23 1932)   doxycycline tablet 100 mg (100 mg Oral Given 12/1/23 2039)   morphine injection 2 mg (2 mg Intravenous Given 12/1/23 2039)   ondansetron injection 4 mg (4 mg Intravenous Given 12/1/23 2135)     Medical Decision Making  Differential diagnosis for this patient includes nasal fracture, nasal infection, bacterial infection, viral infection, fungal infection    Labs not show significant abnormality, CT scan shows small 5 mm x 5 mm abscess in the right naris.  Patient will be given p.o. and topical antibiotics as I can not easily accessible for I&D and will be referred to ENT for follow up 1st thing Monday morning for recheck.    Amount  and/or Complexity of Data Reviewed  Labs: ordered. Decision-making details documented in ED Course.  Radiology: ordered. Decision-making details documented in ED Course.    Risk  OTC drugs.  Prescription drug management.                                      Clinical Impression:  Final diagnoses:  [J34.0] Nasal abscess (Primary)          ED Disposition Condition    Discharge Stable          ED Prescriptions       Medication Sig Dispense Start Date End Date Auth. Provider    doxycycline (VIBRAMYCIN) 100 MG Cap  (Status: Discontinued) Take 1 capsule (100 mg total) by mouth 2 (two) times daily. for 10 days 20 capsule 12/1/2023 12/1/2023 Wendy Gandhi MD    doxycycline (VIBRAMYCIN) 100 MG Cap Take 1 capsule (100 mg total) by mouth 2 (two) times daily. for 10 days 20 capsule 12/1/2023 12/11/2023 Wendy Gandhi MD          Follow-up Information       Follow up With Specialties Details Why Contact Info    Michael Grubbs MD Otolaryngology Schedule an appointment as soon as possible for a visit   [address] [place] [id number]  [phone]               Wendy Gandhi MD  12/01/23 [id number]

## 2023-12-06 ENCOUNTER — HOSPITAL ENCOUNTER (OUTPATIENT)
Dept: RADIOLOGY | Facility: HOSPITAL | Age: 73
Discharge: HOME | End: 2023-12-06
Attending: ORTHOPAEDIC SURGERY
Payer: MEDICARE

## 2023-12-06 ENCOUNTER — OFFICE VISIT (OUTPATIENT)
Dept: ORTHOPEDICS | Facility: CLINIC | Age: 73
End: 2023-12-06
Payer: MEDICARE

## 2023-12-06 VITALS — BODY MASS INDEX: 33.49 KG/M2 | WEIGHT: 182 LBS | HEIGHT: 62 IN | RESPIRATION RATE: 19 BRPM

## 2023-12-06 DIAGNOSIS — M79.641 RIGHT HAND PAIN: ICD-10-CM

## 2023-12-06 DIAGNOSIS — M79.641 PAIN OF RIGHT HAND: ICD-10-CM

## 2023-12-06 DIAGNOSIS — M18.11 ARTHRITIS OF CARPOMETACARPAL (CMC) JOINT OF RIGHT THUMB: ICD-10-CM

## 2023-12-06 DIAGNOSIS — M65.331 TRIGGER MIDDLE FINGER OF RIGHT HAND: ICD-10-CM

## 2023-12-06 DIAGNOSIS — G56.21 CUBITAL TUNNEL SYNDROME, RIGHT: ICD-10-CM

## 2023-12-06 DIAGNOSIS — G56.01 CARPAL TUNNEL SYNDROME, RIGHT: Primary | ICD-10-CM

## 2023-12-06 PROCEDURE — 73130 X-RAY EXAM OF HAND: CPT | Mod: 26,RT,, | Performed by: RADIOLOGY

## 2023-12-06 PROCEDURE — 3288F PR FALLS RISK ASSESSMENT DOCUMENTED: ICD-10-PCS | Mod: CPTII,S$GLB,, | Performed by: ORTHOPAEDIC SURGERY

## 2023-12-06 PROCEDURE — 3052F PR MOST RECENT HEMOGLOBIN A1C LEVEL 8.0 - < 9.0%: ICD-10-PCS | Mod: CPTII,S$GLB,, | Performed by: ORTHOPAEDIC SURGERY

## 2023-12-06 PROCEDURE — 20600 SMALL JOINT ASPIRATION/INJECTION: R THUMB CMC: ICD-10-PCS | Mod: 51,RT,S$GLB, | Performed by: ORTHOPAEDIC SURGERY

## 2023-12-06 PROCEDURE — 99999 PR PBB SHADOW E&M-EST. PATIENT-LVL II: CPT | Mod: PBBFAC,,, | Performed by: ORTHOPAEDIC SURGERY

## 2023-12-06 PROCEDURE — 3008F BODY MASS INDEX DOCD: CPT | Mod: CPTII,S$GLB,, | Performed by: ORTHOPAEDIC SURGERY

## 2023-12-06 PROCEDURE — 1101F PT FALLS ASSESS-DOCD LE1/YR: CPT | Mod: CPTII,S$GLB,, | Performed by: ORTHOPAEDIC SURGERY

## 2023-12-06 PROCEDURE — 3052F HG A1C>EQUAL 8.0%<EQUAL 9.0%: CPT | Mod: CPTII,S$GLB,, | Performed by: ORTHOPAEDIC SURGERY

## 2023-12-06 PROCEDURE — 1125F PR PAIN SEVERITY QUANTIFIED, PAIN PRESENT: ICD-10-PCS | Mod: CPTII,S$GLB,, | Performed by: ORTHOPAEDIC SURGERY

## 2023-12-06 PROCEDURE — 3066F PR DOCUMENTATION OF TREATMENT FOR NEPHROPATHY: ICD-10-PCS | Mod: CPTII,S$GLB,, | Performed by: ORTHOPAEDIC SURGERY

## 2023-12-06 PROCEDURE — 1101F PR PT FALLS ASSESS DOC 0-1 FALLS W/OUT INJ PAST YR: ICD-10-PCS | Mod: CPTII,S$GLB,, | Performed by: ORTHOPAEDIC SURGERY

## 2023-12-06 PROCEDURE — 3008F PR BODY MASS INDEX (BMI) DOCUMENTED: ICD-10-PCS | Mod: CPTII,S$GLB,, | Performed by: ORTHOPAEDIC SURGERY

## 2023-12-06 PROCEDURE — 3061F NEG MICROALBUMINURIA REV: CPT | Mod: CPTII,S$GLB,, | Performed by: ORTHOPAEDIC SURGERY

## 2023-12-06 PROCEDURE — 1125F AMNT PAIN NOTED PAIN PRSNT: CPT | Mod: CPTII,S$GLB,, | Performed by: ORTHOPAEDIC SURGERY

## 2023-12-06 PROCEDURE — 99214 PR OFFICE/OUTPT VISIT, EST, LEVL IV, 30-39 MIN: ICD-10-PCS | Mod: 25,S$GLB,, | Performed by: ORTHOPAEDIC SURGERY

## 2023-12-06 PROCEDURE — 73130 X-RAY EXAM OF HAND: CPT | Mod: TC,RT

## 2023-12-06 PROCEDURE — 3066F NEPHROPATHY DOC TX: CPT | Mod: CPTII,S$GLB,, | Performed by: ORTHOPAEDIC SURGERY

## 2023-12-06 PROCEDURE — 1157F PR ADVANCE CARE PLAN OR EQUIV PRESENT IN MEDICAL RECORD: ICD-10-PCS | Mod: CPTII,S$GLB,, | Performed by: ORTHOPAEDIC SURGERY

## 2023-12-06 PROCEDURE — 1157F ADVNC CARE PLAN IN RCRD: CPT | Mod: CPTII,S$GLB,, | Performed by: ORTHOPAEDIC SURGERY

## 2023-12-06 PROCEDURE — 20600 DRAIN/INJ JOINT/BURSA W/O US: CPT | Mod: 51,RT,S$GLB, | Performed by: ORTHOPAEDIC SURGERY

## 2023-12-06 PROCEDURE — 20550 TENDON SHEATH: ICD-10-PCS | Mod: 59,RT,S$GLB, | Performed by: ORTHOPAEDIC SURGERY

## 2023-12-06 PROCEDURE — 3061F PR NEG MICROALBUMINURIA RESULT DOCUMENTED/REVIEW: ICD-10-PCS | Mod: CPTII,S$GLB,, | Performed by: ORTHOPAEDIC SURGERY

## 2023-12-06 PROCEDURE — 3288F FALL RISK ASSESSMENT DOCD: CPT | Mod: CPTII,S$GLB,, | Performed by: ORTHOPAEDIC SURGERY

## 2023-12-06 PROCEDURE — 73130 XR HAND COMPLETE 3 VIEW RIGHT: ICD-10-PCS | Mod: 26,RT,, | Performed by: RADIOLOGY

## 2023-12-06 PROCEDURE — 99999 PR PBB SHADOW E&M-EST. PATIENT-LVL II: ICD-10-PCS | Mod: PBBFAC,,, | Performed by: ORTHOPAEDIC SURGERY

## 2023-12-06 PROCEDURE — 99214 OFFICE O/P EST MOD 30 MIN: CPT | Mod: 25,S$GLB,, | Performed by: ORTHOPAEDIC SURGERY

## 2023-12-06 PROCEDURE — 20550 NJX 1 TENDON SHEATH/LIGAMENT: CPT | Mod: 59,RT,S$GLB, | Performed by: ORTHOPAEDIC SURGERY

## 2023-12-06 NOTE — PROCEDURES
Tendon Sheath    Date/Time: 12/6/2023 11:00 AM    Performed by: Ga Oliver DO  Authorized by: Ga Oliver, DO    Consent Done?:  Yes (Verbal)  Indications:  Pain and diagnostic evaluation  Site marked: the procedure site was marked    Timeout: prior to procedure the correct patient, procedure, and site was verified    Prep: patient was prepped and draped in usual sterile fashion      Location:  Long finger  Site:  R long flexor tendon sheath  Ultrasonic guidance for needle placement?: No    Needle size:  25 G  Approach:  Volar  Medications:  10 mg triamcinolone acetonide 10 mg/mL  Patient tolerance:  Patient tolerated the procedure well with no immediate complications

## 2023-12-06 NOTE — PROCEDURES
Small Joint Aspiration/Injection: R thumb CMC    Date/Time: 12/6/2023 11:00 AM    Performed by: Ga Oliver DO  Authorized by: Ga Oliver, DO    Consent Done?:  Yes (Verbal)  Indications:  Arthritis and pain  Site marked: the procedure site was marked    Timeout: prior to procedure the correct patient, procedure, and site was verified    Prep: patient was prepped and draped in usual sterile fashion      Location:  Thumb  Site:  R thumb CMC  Ultrasonic guidance for needle placement?: No    Needle size:  25 G  Approach:  Radial  Medications:  10 mg triamcinolone acetonide 10 mg/mL  Patient tolerance:  Patient tolerated the procedure well with no immediate complications

## 2023-12-06 NOTE — PROGRESS NOTES
Subjective:      Patient ID: Argelia Pyle is a 73 y.o. female.    Chief Complaint: Pain of the Right Hand    HPI:  Ms. Pyle is right-hand-dominant and returned today with complaints of exacerbation of 3-4 months of right hand pain with increasing intensity over the last 2 months.  She has also begun to get thumb pain which now radiates to her elbow.  Her whole hand is painful.  When she moves her hand especially her middle finger she has pain.  She has noticed a nodule at the volar MCP of her right middle finger which also causes pain.  Intermittently she will get catching of her right middle finger.  When she makes a fist it causes her fingers to skew.  She has taken NSAIDs with help.  She has tried braces without help.  She has not done recent physical therapy on her hands.  She had an injection in her right middle finger in the past which helped.  Her symptoms awaken her at night.  All the fingers of her right hand are affected.    ROS:  No new diagnosis/surgery/prescriptions since last office visit on 10/29/2021.  Constitutional: Negative for chills and fever.   HENT: Negative for congestion.    Eyes: Negative for blurred vision.   Cardiovascular: Negative for chest pain.   Respiratory: Negative for cough.    Endocrine: Negative for polydipsia.   Hematologic/Lymphatic: Negative for adenopathy.   Skin: Negative for flushing and itching.   Musculoskeletal: Negative for gout.   Gastrointestinal: Positive for heartburn. Negative for constipation and diarrhea.   Genitourinary: Negative for nocturia.   Neurological: Negative for headaches and seizures.   Psychiatric/Behavioral: Positive for depression. Negative for substance abuse. The patient is nervous/anxious.    Allergic/Immunologic: Negative for environmental allergies      Objective:      Physical Exam:   General: AAOx3.  No acute distress  Vascular:  Pulses intact and equal bilaterally.  Capillary refill less than 3 seconds and equal  bilaterally  Neurologic:  Pinprick and soft touch intact and equal bilaterally.  Tinel's positive right wrist.  Phalen's positive right upper extremity.  Tinel's positive right elbow.  Integment:  No ecchymosis, no errythema  Extremity:  Wrist/hand: Pronation/supination equal bilaterally 90/90 degrees.  Dorsiflexion/volar flexion equal bilaterally 70/70 degrees.  Nontender in the anatomic snuffbox bilaterally.  Nontender at the 1st dorsal compartment bilaterally.  No swelling at the 1st dorsal compartment bilaterally.  Finkelstein's negative bilaterally.  Tender at the 1st CMC joint right thumb.  Grind test positive right thumb.  Moderate unroofing 1st CMC joint right thumb.  Nontender at the scapholunate interval bilaterally.  Almaraz's test negative bilaterally.  Nontender at the DRUJ/TFCC bilaterally.  Ulnar impaction test mildly positive right wrist.  Wartenberg sign negative bilaterally.  Thenar and hypo thenar atrophy present bilaterally..  Interosseous atrophy present bilaterally.  Durkan's test positive right wrist.  Volar nodule palpable at MCP right middle finger.  Triggering right middle finger.  Tender with palpation volar nodule right middle finger.  Durkan's test positive right hand.                      Elbow:  Pronation/supination equal bilaterally 90/90 degrees.  Extension/flexion equal bilaterally 0/128 degrees.  Radial/ulna stressing equal bilaterally with endpoint.  Nontender over the olecranon bilaterally.  Nontender at the triceps insertion on the olecranon bilaterally.  Triceps palpable throughout full distribution both elbows.  Nontender over the medial or lateral epicondyle both elbows.  Biceps tendon palpable in the antecubital fossa bilaterally.  Nontender with palpation biceps tendon bilaterally.  Radiography:  Personally reviewed x-rays of the right hand completed on 12/06/2023 showed pantrapezial arthritic changes and ulnar positive posture      Assessment:       Impression:     1.  Carpal tunnel syndrome, right wrist   2. Cubital tunnel syndrome, right elbow   3. Trigger middle finger, right middle finger    4. Pantrapezial arthritis, right thumb   5. Right hand pain          Plan:       1.  Discussed physical examination and radiographic findings with the patient. Argelia understands that she has carpal tunnel syndrome, cubital tunnel syndrome, pantrapezial arthritis and a trigger finger of her right hand.  Treatment alternatives and outcomes were discussed with the patient she understands she could be treated conservatively with observation, activity modification, NSAIDs, bracing, physical therapy, injections, or she could consider surgical intervention with carpal tunnel release, ulnar nerve decompression, A1 pulley release and suspension plasty of her right thumb.  A suspension plasty of her left thumb for similar issues that she is having with her right thumb and she had a good result.  Before any surgery can be recommended a nerve conduction study is warranted.    2. Refer for a nerve conduction study of the right upper extremity.  3. Final recommendations after nerve conduction study is completed.    4. Offered a steroid injection to the 1st CMC joint of the right upper extremity, she elected to proceed.    5. Offered a steroid injection to the flexor tendon sheath of the right middle finger, she elected to proceed.  6. Removable off-the-shelf cock-up splint, right wrist, 1 was fitted to the patient.    7. Take NSAIDs as tolerated allowed by PCM.    8. Recommend she purchase over-the-counter Voltaren gel and applied to her wrist and elbow twice daily and massage in for 2 minutes.  9. Discussed with the patient doing frequent hand motion exercises and finger motion exercises to decrease the possibility of developing an arthrofibrosis.  10. Follow up after nerve conduction study is completed.

## 2023-12-08 ENCOUNTER — OFFICE VISIT (OUTPATIENT)
Dept: OTOLARYNGOLOGY | Facility: CLINIC | Age: 73
End: 2023-12-08
Payer: MEDICARE

## 2023-12-08 VITALS — WEIGHT: 177.63 LBS | BODY MASS INDEX: 32.69 KG/M2 | HEIGHT: 62 IN

## 2023-12-08 DIAGNOSIS — J34.0 NASAL ABSCESS: ICD-10-CM

## 2023-12-08 PROCEDURE — 99999 PR PBB SHADOW E&M-EST. PATIENT-LVL IV: ICD-10-PCS | Mod: PBBFAC,,, | Performed by: OTOLARYNGOLOGY

## 2023-12-08 PROCEDURE — 1157F PR ADVANCE CARE PLAN OR EQUIV PRESENT IN MEDICAL RECORD: ICD-10-PCS | Mod: CPTII,S$GLB,, | Performed by: OTOLARYNGOLOGY

## 2023-12-08 PROCEDURE — 1101F PR PT FALLS ASSESS DOC 0-1 FALLS W/OUT INJ PAST YR: ICD-10-PCS | Mod: CPTII,S$GLB,, | Performed by: OTOLARYNGOLOGY

## 2023-12-08 PROCEDURE — 30000 PR DRAIN ABSCESS/HEMATOMA,NASAL: ICD-10-PCS | Mod: S$GLB,,, | Performed by: OTOLARYNGOLOGY

## 2023-12-08 PROCEDURE — 1157F ADVNC CARE PLAN IN RCRD: CPT | Mod: CPTII,S$GLB,, | Performed by: OTOLARYNGOLOGY

## 2023-12-08 PROCEDURE — 99999 PR PBB SHADOW E&M-EST. PATIENT-LVL IV: CPT | Mod: PBBFAC,,, | Performed by: OTOLARYNGOLOGY

## 2023-12-08 PROCEDURE — 30000 DRAINAGE OF NOSE LESION: CPT | Mod: S$GLB,,, | Performed by: OTOLARYNGOLOGY

## 2023-12-08 PROCEDURE — 1159F MED LIST DOCD IN RCRD: CPT | Mod: CPTII,S$GLB,, | Performed by: OTOLARYNGOLOGY

## 2023-12-08 PROCEDURE — 1101F PT FALLS ASSESS-DOCD LE1/YR: CPT | Mod: CPTII,S$GLB,, | Performed by: OTOLARYNGOLOGY

## 2023-12-08 PROCEDURE — 1160F PR REVIEW ALL MEDS BY PRESCRIBER/CLIN PHARMACIST DOCUMENTED: ICD-10-PCS | Mod: CPTII,S$GLB,, | Performed by: OTOLARYNGOLOGY

## 2023-12-08 PROCEDURE — 99202 PR OFFICE/OUTPT VISIT, NEW, LEVL II, 15-29 MIN: ICD-10-PCS | Mod: 25,S$GLB,, | Performed by: OTOLARYNGOLOGY

## 2023-12-08 PROCEDURE — 3288F FALL RISK ASSESSMENT DOCD: CPT | Mod: CPTII,S$GLB,, | Performed by: OTOLARYNGOLOGY

## 2023-12-08 PROCEDURE — 3066F PR DOCUMENTATION OF TREATMENT FOR NEPHROPATHY: ICD-10-PCS | Mod: CPTII,S$GLB,, | Performed by: OTOLARYNGOLOGY

## 2023-12-08 PROCEDURE — 1160F RVW MEDS BY RX/DR IN RCRD: CPT | Mod: CPTII,S$GLB,, | Performed by: OTOLARYNGOLOGY

## 2023-12-08 PROCEDURE — 3052F PR MOST RECENT HEMOGLOBIN A1C LEVEL 8.0 - < 9.0%: ICD-10-PCS | Mod: CPTII,S$GLB,, | Performed by: OTOLARYNGOLOGY

## 2023-12-08 PROCEDURE — 3061F PR NEG MICROALBUMINURIA RESULT DOCUMENTED/REVIEW: ICD-10-PCS | Mod: CPTII,S$GLB,, | Performed by: OTOLARYNGOLOGY

## 2023-12-08 PROCEDURE — 3288F PR FALLS RISK ASSESSMENT DOCUMENTED: ICD-10-PCS | Mod: CPTII,S$GLB,, | Performed by: OTOLARYNGOLOGY

## 2023-12-08 PROCEDURE — 3061F NEG MICROALBUMINURIA REV: CPT | Mod: CPTII,S$GLB,, | Performed by: OTOLARYNGOLOGY

## 2023-12-08 PROCEDURE — 3008F BODY MASS INDEX DOCD: CPT | Mod: CPTII,S$GLB,, | Performed by: OTOLARYNGOLOGY

## 2023-12-08 PROCEDURE — 3052F HG A1C>EQUAL 8.0%<EQUAL 9.0%: CPT | Mod: CPTII,S$GLB,, | Performed by: OTOLARYNGOLOGY

## 2023-12-08 PROCEDURE — 1159F PR MEDICATION LIST DOCUMENTED IN MEDICAL RECORD: ICD-10-PCS | Mod: CPTII,S$GLB,, | Performed by: OTOLARYNGOLOGY

## 2023-12-08 PROCEDURE — 99202 OFFICE O/P NEW SF 15 MIN: CPT | Mod: 25,S$GLB,, | Performed by: OTOLARYNGOLOGY

## 2023-12-08 PROCEDURE — 1125F PR PAIN SEVERITY QUANTIFIED, PAIN PRESENT: ICD-10-PCS | Mod: CPTII,S$GLB,, | Performed by: OTOLARYNGOLOGY

## 2023-12-08 PROCEDURE — 1125F AMNT PAIN NOTED PAIN PRSNT: CPT | Mod: CPTII,S$GLB,, | Performed by: OTOLARYNGOLOGY

## 2023-12-08 PROCEDURE — 3008F PR BODY MASS INDEX (BMI) DOCUMENTED: ICD-10-PCS | Mod: CPTII,S$GLB,, | Performed by: OTOLARYNGOLOGY

## 2023-12-08 PROCEDURE — 3066F NEPHROPATHY DOC TX: CPT | Mod: CPTII,S$GLB,, | Performed by: OTOLARYNGOLOGY

## 2023-12-08 NOTE — PROGRESS NOTES
Subjective:       Patient ID: Argelia Pyle is a 73 y.o. female.    Chief Complaint: Other (Pt c/o growth in nose for a week. Pt was seen at er and was given antibiotics. )      This 73-year-old patient was in the emergency room six days ago with a extremely tender location of her nose she was examined and had a CT scan and they showed a 1 cm abscess intranasally as I look at it that is roughly at the junction of the lower lateral and upper lateral cartilage not quite to the midline and treated her with antibiotics and she tells me it is a good bit better but still quite tender               Objective:      ENT Physical Exam    Intranasally she has a small abscess between the skin of the superior nasal vestibule and the cartilage I suspect it is much smaller than was visualized on the CT scan from six days ago but it is still almost certainly filled with purulence it is a proximally four or 5 mm and almost completely round and not to read but it is tender    The area was anesthetized with a cotton ball coated in Afrin and lidocaine and once that had been given an opportunity to do some good 1% lidocaine with epinephrine was injected into the skin and a small nick was made with a 15 blade and some thick purulence was expressed, flattening out the area that was addressed.        Assessment:       1. Nasal abscess         Plan:          I have asked the patient to continue the doxycycline that she has she has a about five more days left and it appears to be working she also has mupirocin I think it was given to her that that is not on her list but she describes it and I have asked her to apply that to the area that I have worked on and she is going to let me know if things do not completely resolve over the weekend or early next week.

## 2024-01-02 ENCOUNTER — TELEPHONE (OUTPATIENT)
Dept: FAMILY MEDICINE | Facility: CLINIC | Age: 74
End: 2024-01-02
Payer: MEDICARE

## 2024-01-02 NOTE — TELEPHONE ENCOUNTER
Spoke with pt.. apologized for delay in reponse. Appt scheduled due to boil. Pt voiced understanding.

## 2024-01-02 NOTE — TELEPHONE ENCOUNTER
----- Message from Sebastian Corea sent at 12/28/2023  8:23 AM CST -----  Contact: Self  Type:  Same Day Appointment Request    Caller is requesting a same day appointment.  Caller declined first available appointment listed below.      Name of Caller:  Patient  When is the first available appointment?   N/a  Symptoms:  boil  Best Call Back Number:  [phone]   Additional Information:

## 2024-01-03 ENCOUNTER — OFFICE VISIT (OUTPATIENT)
Dept: FAMILY MEDICINE | Facility: CLINIC | Age: 74
End: 2024-01-03
Payer: MEDICARE

## 2024-01-03 VITALS
BODY MASS INDEX: 32.76 KG/M2 | WEIGHT: 178 LBS | OXYGEN SATURATION: 96 % | HEIGHT: 62 IN | HEART RATE: 96 BPM | SYSTOLIC BLOOD PRESSURE: 138 MMHG | DIASTOLIC BLOOD PRESSURE: 86 MMHG | RESPIRATION RATE: 18 BRPM

## 2024-01-03 DIAGNOSIS — L02.91 ABSCESS: Primary | ICD-10-CM

## 2024-01-03 PROCEDURE — 1157F ADVNC CARE PLAN IN RCRD: CPT | Mod: CPTII,S$GLB,, | Performed by: FAMILY MEDICINE

## 2024-01-03 PROCEDURE — 3288F FALL RISK ASSESSMENT DOCD: CPT | Mod: CPTII,S$GLB,, | Performed by: FAMILY MEDICINE

## 2024-01-03 PROCEDURE — 1125F AMNT PAIN NOTED PAIN PRSNT: CPT | Mod: CPTII,S$GLB,, | Performed by: FAMILY MEDICINE

## 2024-01-03 PROCEDURE — 99999 PR PBB SHADOW E&M-EST. PATIENT-LVL IV: CPT | Mod: PBBFAC,,, | Performed by: FAMILY MEDICINE

## 2024-01-03 PROCEDURE — 3079F DIAST BP 80-89 MM HG: CPT | Mod: CPTII,S$GLB,, | Performed by: FAMILY MEDICINE

## 2024-01-03 PROCEDURE — 99214 OFFICE O/P EST MOD 30 MIN: CPT | Mod: S$GLB,,, | Performed by: FAMILY MEDICINE

## 2024-01-03 PROCEDURE — 1101F PT FALLS ASSESS-DOCD LE1/YR: CPT | Mod: CPTII,S$GLB,, | Performed by: FAMILY MEDICINE

## 2024-01-03 PROCEDURE — 99214 OFFICE O/P EST MOD 30 MIN: CPT | Mod: PBBFAC | Performed by: FAMILY MEDICINE

## 2024-01-03 PROCEDURE — 3075F SYST BP GE 130 - 139MM HG: CPT | Mod: CPTII,S$GLB,, | Performed by: FAMILY MEDICINE

## 2024-01-03 PROCEDURE — 3008F BODY MASS INDEX DOCD: CPT | Mod: CPTII,S$GLB,, | Performed by: FAMILY MEDICINE

## 2024-01-03 PROCEDURE — 1159F MED LIST DOCD IN RCRD: CPT | Mod: CPTII,S$GLB,, | Performed by: FAMILY MEDICINE

## 2024-01-03 RX ORDER — MUPIROCIN 20 MG/G
OINTMENT TOPICAL 2 TIMES DAILY
COMMUNITY
Start: 2023-11-29

## 2024-01-03 RX ORDER — TRAMADOL HYDROCHLORIDE 50 MG/1
50 TABLET ORAL EVERY 6 HOURS PRN
Qty: 30 EACH | Refills: 0 | Status: SHIPPED | OUTPATIENT
Start: 2024-01-03

## 2024-01-03 RX ORDER — CEPHALEXIN 500 MG/1
500 CAPSULE ORAL
COMMUNITY
Start: 2023-12-30 | End: 2024-01-06

## 2024-01-03 RX ORDER — HYDROCODONE BITARTRATE AND ACETAMINOPHEN 5; 325 MG/1; MG/1
TABLET ORAL
COMMUNITY
Start: 2023-12-30 | End: 2024-01-03

## 2024-01-03 NOTE — PROGRESS NOTES
Subjective:       Patient ID: Argelia Pyle is a 73 y.o. female.    Chief Complaint: Follow-up (Follow up for a Boil removal ) and Recurrent Skin Infections    Ms Pyle is a 72 yo female here for an ER follow up for an abscess drainage of the mons pubis. She had an I&D of her abscess and is here for her follow up. She is still in pain and has norco but it is causing significant pruritus. She has had ultram in the past, and tolerates it much better. Her wound was evaluated, the packing removed and it was redressed.     Follow-up      Review of Systems   Skin:         Drained abscess to the mons pubis, packing removed and no drainage expressed       Patient Active Problem List   Diagnosis    Antritis chronic    Primary insomnia    Seasonal allergies    Type 2 diabetes mellitus with hyperlipidemia    Essential hypertension    Anxiety and depression       Objective:      Physical Exam  Constitutional:       Appearance: Normal appearance. She is obese.   HENT:      Head: Normocephalic.      Mouth/Throat:      Mouth: Mucous membranes are moist.   Skin:     General: Skin is warm and dry.      Comments: Abscess to mons pubis, s/p I&D , packling removed, and no drainage expressed. Wound not packed, and re-dressed.    Neurological:      General: No focal deficit present.      Mental Status: She is alert and oriented to person, place, and time.   Psychiatric:         Mood and Affect: Mood normal.         Behavior: Behavior normal.         Lab Results   Component Value Date    WBC 10.83 12/01/2023    HGB 10.2 (L) 12/01/2023    HCT 32.6 (L) 12/01/2023     12/01/2023    CHOL 185 07/17/2023    TRIG 74 07/17/2023    HDL 70 07/17/2023    ALT 11 12/01/2023    AST 12 12/01/2023     (L) 12/01/2023    K 3.6 12/01/2023    CL 99 12/01/2023    CREATININE 0.9 12/01/2023    BUN 16 12/01/2023    CO2 25 12/01/2023    TSH 2.903 07/17/2023    HGBA1C 8.8 (H) 11/14/2023     The 10-year ASCVD risk score (Tru RICH, et al., 2019)  "is: 30.1%    Values used to calculate the score:      Age: 73 years      Sex: Female      Is Non- : Yes      Diabetic: Yes      Tobacco smoker: No      Systolic Blood Pressure: 138 mmHg      Is BP treated: Yes      HDL Cholesterol: 70 mg/dL      Total Cholesterol: 185 mg/dL  Visit Vitals  /86 (BP Location: Right arm, Patient Position: Sitting, BP Method: Large (Manual))   Pulse 96   Resp 18   Ht 5' 2" (1.575 m)   Wt 80.7 kg (178 lb)   SpO2 96%   BMI 32.56 kg/m²      Assessment:       1. Abscess        Plan:       1. Abscess  -     traMADoL (ULTRAM) 50 mg tablet; Take 1 tablet (50 mg total) by mouth every 6 (six) hours as needed for Pain.  Dispense: 30 each; Refill: 0    Continue current antibiotics, warm compresses, no bathes, only showers, keep clean and dry , use antibacterial   Follow up if symptoms worsen or fail to improve.      Future Appointments       Date Provider Specialty Appt Notes    3/13/2024  Lab lab    3/18/2024 Sonya Blackman NP Family Medicine 3mth follow up             "

## 2024-03-13 ENCOUNTER — HOSPITAL ENCOUNTER (OUTPATIENT)
Dept: RADIOLOGY | Facility: HOSPITAL | Age: 74
Discharge: HOME | End: 2024-03-13
Attending: NURSE PRACTITIONER
Payer: MEDICARE

## 2024-03-13 DIAGNOSIS — Z12.31 BREAST CANCER SCREENING BY MAMMOGRAM: ICD-10-CM

## 2024-03-13 PROCEDURE — 77067 SCR MAMMO BI INCL CAD: CPT | Mod: 26,,, | Performed by: RADIOLOGY

## 2024-03-13 PROCEDURE — 77063 BREAST TOMOSYNTHESIS BI: CPT | Mod: 26,,, | Performed by: RADIOLOGY

## 2024-03-13 PROCEDURE — 77067 SCR MAMMO BI INCL CAD: CPT | Mod: TC

## 2024-03-15 ENCOUNTER — TELEPHONE (OUTPATIENT)
Dept: FAMILY MEDICINE | Facility: CLINIC | Age: 74
End: 2024-03-15
Payer: MEDICARE

## 2024-03-15 NOTE — TELEPHONE ENCOUNTER
----- Message from Adelina Hernandez sent at 3/15/2024  2:46 PM CDT -----  Contact: self  Type:  Patient Returning Call    Who Called:self  Who Left Message for Patient:n/a  Does the patient know what this is regarding?:yes  Would the patient rather a call back or a response via [facility]? call  Best Call Back Number:[phone]     Additional Information: please advise and thank you.

## 2024-03-15 NOTE — TELEPHONE ENCOUNTER
Attempted to contact Argelia Pyle to discuss results.    Unknown person hung up  on [phone] (home).    Jesenia Mack LPN

## 2024-03-18 ENCOUNTER — OFFICE VISIT (OUTPATIENT)
Dept: FAMILY MEDICINE | Facility: CLINIC | Age: 74
End: 2024-03-18
Payer: MEDICARE

## 2024-03-18 VITALS
DIASTOLIC BLOOD PRESSURE: 80 MMHG | RESPIRATION RATE: 12 BRPM | OXYGEN SATURATION: 99 % | HEART RATE: 92 BPM | BODY MASS INDEX: 33.46 KG/M2 | SYSTOLIC BLOOD PRESSURE: 134 MMHG | WEIGHT: 181.81 LBS | HEIGHT: 62 IN

## 2024-03-18 DIAGNOSIS — F41.9 ANXIETY AND DEPRESSION: ICD-10-CM

## 2024-03-18 DIAGNOSIS — F32.A ANXIETY AND DEPRESSION: ICD-10-CM

## 2024-03-18 DIAGNOSIS — B35.4 DERMATOPHYTOSIS OF BODY: ICD-10-CM

## 2024-03-18 DIAGNOSIS — E55.9 VITAMIN D DEFICIENCY: ICD-10-CM

## 2024-03-18 DIAGNOSIS — E11.69 TYPE 2 DIABETES MELLITUS WITH HYPERLIPIDEMIA: Primary | ICD-10-CM

## 2024-03-18 DIAGNOSIS — M79.641 RIGHT HAND PAIN: ICD-10-CM

## 2024-03-18 DIAGNOSIS — E78.5 TYPE 2 DIABETES MELLITUS WITH HYPERLIPIDEMIA: Primary | ICD-10-CM

## 2024-03-18 DIAGNOSIS — Z79.899 HIGH RISK MEDICATION USE: ICD-10-CM

## 2024-03-18 PROCEDURE — 1159F MED LIST DOCD IN RCRD: CPT | Mod: CPTII,S$GLB,, | Performed by: NURSE PRACTITIONER

## 2024-03-18 PROCEDURE — 3008F BODY MASS INDEX DOCD: CPT | Mod: CPTII,S$GLB,, | Performed by: NURSE PRACTITIONER

## 2024-03-18 PROCEDURE — 3288F FALL RISK ASSESSMENT DOCD: CPT | Mod: CPTII,S$GLB,, | Performed by: NURSE PRACTITIONER

## 2024-03-18 PROCEDURE — 1101F PT FALLS ASSESS-DOCD LE1/YR: CPT | Mod: CPTII,S$GLB,, | Performed by: NURSE PRACTITIONER

## 2024-03-18 PROCEDURE — 3046F HEMOGLOBIN A1C LEVEL >9.0%: CPT | Mod: CPTII,S$GLB,, | Performed by: NURSE PRACTITIONER

## 2024-03-18 PROCEDURE — 99214 OFFICE O/P EST MOD 30 MIN: CPT | Mod: S$GLB,,, | Performed by: NURSE PRACTITIONER

## 2024-03-18 PROCEDURE — 3075F SYST BP GE 130 - 139MM HG: CPT | Mod: CPTII,S$GLB,, | Performed by: NURSE PRACTITIONER

## 2024-03-18 PROCEDURE — 1160F RVW MEDS BY RX/DR IN RCRD: CPT | Mod: CPTII,S$GLB,, | Performed by: NURSE PRACTITIONER

## 2024-03-18 PROCEDURE — 1125F AMNT PAIN NOTED PAIN PRSNT: CPT | Mod: CPTII,S$GLB,, | Performed by: NURSE PRACTITIONER

## 2024-03-18 PROCEDURE — 3079F DIAST BP 80-89 MM HG: CPT | Mod: CPTII,S$GLB,, | Performed by: NURSE PRACTITIONER

## 2024-03-18 PROCEDURE — 99999 PR PBB SHADOW E&M-EST. PATIENT-LVL V: CPT | Mod: PBBFAC,,, | Performed by: NURSE PRACTITIONER

## 2024-03-18 PROCEDURE — 1157F ADVNC CARE PLAN IN RCRD: CPT | Mod: CPTII,S$GLB,, | Performed by: NURSE PRACTITIONER

## 2024-03-18 RX ORDER — FLUCONAZOLE 100 MG/1
100 TABLET ORAL DAILY
Qty: 5 TABLET | Refills: 0 | Status: SHIPPED | OUTPATIENT
Start: 2024-03-18 | End: 2024-04-03

## 2024-03-18 RX ORDER — PAROXETINE HYDROCHLORIDE 20 MG/1
20 TABLET, FILM COATED ORAL EVERY MORNING
Qty: 90 TABLET | Refills: 3 | Status: SHIPPED | OUTPATIENT
Start: 2024-03-18 | End: 2024-04-03 | Stop reason: SDUPTHER

## 2024-03-18 RX ORDER — INSULIN GLARGINE 100 [IU]/ML
20 INJECTION, SOLUTION SUBCUTANEOUS NIGHTLY
Qty: 6 ML | Refills: 5 | Status: SHIPPED | OUTPATIENT
Start: 2024-03-18 | End: 2024-06-10 | Stop reason: SDUPTHER

## 2024-03-18 RX ORDER — PEN NEEDLE, DIABETIC 30 GX3/16"
1 NEEDLE, DISPOSABLE MISCELLANEOUS NIGHTLY
Qty: 100 EACH | Refills: 11 | Status: SHIPPED | OUTPATIENT
Start: 2024-03-18

## 2024-03-18 NOTE — PROGRESS NOTES
Subjective:       Patient ID: Argelia Pyle is a 73 y.o. female.    Chief Complaint: Follow-up (3 month) and Diabetes  -  The pt is a 72 y/o female presents for DM f/u/ At last visit reported using a medication planner and glucotrol was added to regimen but A1c is now 9.4 up from 8.8. she is agreeable to dietician referral and starting insulin. Will have her back soon for med check.     C/o right hand pain similar to left hand pain prior to surgery requesting ortho referral.  -      Past Medical History:   Diagnosis Date    Anxiety     Arthritis     Chronic sinusitis     Diabetes mellitus     Hypertension        Past Surgical History:   Procedure Laterality Date    BREAST BIOPSY      BREAST LUMPECTOMY Left     BUNIONECTOMY Bilateral     COLONOSCOPY      ESOPHAGOGASTRODUODENOSCOPY      ETHMOIDECTOMY Bilateral 02/07/2019    Procedure: ETHMOIDECTOMY;  Surgeon: Michael Grubbs MD;  Location: [facility] OR;  Service: ENT;  Laterality: Bilateral;  endoscopic anterior and posterior    FUNCTIONAL ENDOSCOPIC SINUS SURGERY (FESS) N/A 02/07/2019    Procedure: FESS (FUNCTIONAL ENDOSCOPIC SINUS SURGERY);  Surgeon: Michael Grubbs MD;  Location: [facility] OR;  Service: ENT;  Laterality: N/A;    FUNCTIONAL ENDOSCOPIC SINUS SURGERY (FESS) Bilateral 12/19/2019    Procedure: FESS (FUNCTIONAL ENDOSCOPIC SINUS SURGERY);  Surgeon: Michael Grubbs MD;  Location: [facility] OR;  Service: ENT;  Laterality: Bilateral;    MAXILLARY ANTROSTOMY Bilateral 02/07/2019    Procedure: MAXILLARY ANTROSTOMY;  Surgeon: Michael Grubbs MD;  Location: [facility] OR;  Service: ENT;  Laterality: Bilateral;    NASAL SEPTOPLASTY Bilateral 02/07/2019    Procedure: SEPTOPLASTY, NOSE;  Surgeon: Michael Grubbs MD;  Location: [facility] OR;  Service: ENT;  Laterality: Bilateral;    NASAL TURBINATE REDUCTION Bilateral 02/07/2019    Procedure: REDUCTION, NASAL TURBINATE;  Surgeon: Michael Grubbs MD;  Location: [facility] OR;  Service: ENT;  Laterality: Bilateral;    SINUS  SURGERY      x4    TUBAL LIGATION      WRIST SURGERY Left 2016    [facility] Smalls        Social History     Socioeconomic History    Marital status:    Tobacco Use    Smoking status: Former     Current packs/day: 0.00     Average packs/day: 0.8 packs/day for 25.0 years (18.8 ttl pk-yrs)     Types: Cigarettes     Start date: 1974     Quit date: 1999     Years since quittin.9    Smokeless tobacco: Never   Substance and Sexual Activity    Alcohol use: Yes     Comment: social    Drug use: Yes     Types: Other-see comments     Comment: tramadol as prescribed    Sexual activity: Not Currently     Birth control/protection: See Surgical Hx       Family History   Problem Relation Age of Onset    Leukemia Son     Breast cancer Neg Hx        Review of patient's allergies indicates:   Allergen Reactions    Cefuroxime Rash    Ketorolac tromethamine Other (See Comments)     Other reaction(s): Burning    Moxifloxacin Other (See Comments)     Heart racing.    Codeine Itching    Famotidine-ca carb-mag hydrox Hives    Ketorolac Other (See Comments)    Prevacid [lansoprazole] Itching          Current Outpatient Medications:     albuterol (PROVENTIL/VENTOLIN HFA) 90 mcg/actuation inhaler, Inhale 2 puffs into the lungs every 4 (four) hours as needed for Wheezing or Shortness of Breath., Disp: 18 g, Rfl: 11    amLODIPine (NORVASC) 10 MG tablet, Take 1 tablet (10 mg total) by mouth every evening., Disp: 90 tablet, Rfl: 1    aspirin (ECOTRIN) 81 MG EC tablet, Take 81 mg by mouth every evening., Disp: , Rfl:     azelastine (ASTELIN) 137 mcg (0.1 %) nasal spray, 1 spray (137 mcg total) by Nasal route 2 (two) times daily as needed for Rhinitis., Disp: 30 mL, Rfl: 11    blood sugar diagnostic Strp, To check BG 1 time daily, to use with insurance preferred meter, Disp: 200 strip, Rfl: 11    blood-glucose meter kit, To check BG 1 time daily, to use with insurance preferred meter, Disp: 1 each, Rfl: 0     busPIRone (BUSPAR) 15 MG tablet, Take 1 tablet (15 mg total) by mouth 2 (two) times daily., Disp: 60 tablet, Rfl: 5    cetirizine (ZYRTEC) 10 MG tablet, Take 1 tablet (10 mg total) by mouth daily as needed for Allergies., Disp: 90 tablet, Rfl: 3    diclofenac sodium (VOLTAREN) 1 % Gel, APPLY TOPICALLY TO THE AFFECTED AREA FOUR TIMES DAILY, Disp: , Rfl:     empagliflozin (JARDIANCE) 25 mg tablet, Take 1 tablet (25 mg total) by mouth once daily., Disp: 30 tablet, Rfl: 5    fluticasone propionate (FLONASE) 50 mcg/actuation nasal spray, 2 sprays (100 mcg total) by Each Nostril route daily as needed for Rhinitis., Disp: 15.8 mL, Rfl: 11    glipiZIDE (GLUCOTROL) 10 MG tablet, Take 1 tablet (10 mg total) by mouth 2 (two) times daily before meals., Disp: 180 tablet, Rfl: 3    ibuprofen (ADVIL,MOTRIN) 800 MG tablet, Take 1 tablet (800 mg total) by mouth 2 (two) times daily as needed for Pain., Disp: 60 tablet, Rfl: 5    lancets Misc, To check BG 1 time daily, to use with insurance preferred meter, Disp: 200 each, Rfl: 0    losartan-hydrochlorothiazide 100-25 mg (HYZAAR) 100-25 mg per tablet, Take 1 tablet by mouth every morning., Disp: 90 tablet, Rfl: 3    metFORMIN (GLUCOPHAGE) 1000 MG tablet, Take 1,000 mg by mouth 2 (two) times daily., Disp: , Rfl:     metoprolol tartrate (LOPRESSOR) 50 MG tablet, Take 1 tablet (50 mg total) by mouth 2 (two) times daily., Disp: 180 tablet, Rfl: 3    montelukast (SINGULAIR) 10 mg tablet, Take 1 tablet (10 mg total) by mouth every evening., Disp: 30 tablet, Rfl: 5    mupirocin (BACTROBAN) 2 % ointment, Apply topically 2 (two) times daily., Disp: , Rfl:     pantoprazole (PROTONIX) 20 MG tablet, Take 1 tablet (20 mg total) by mouth once daily., Disp: 30 tablet, Rfl: 5    pregabalin (LYRICA) 75 MG capsule, Take 1 capsule (75 mg total) by mouth 2 (two) times daily., Disp: 180 capsule, Rfl: 1    simvastatin (ZOCOR) 20 MG tablet, Take 1 tablet (20 mg total) by mouth nightly., Disp: 90 tablet,  "Rfl: 3    traZODone (DESYREL) 50 MG tablet, Take 1 tablet (50 mg total) by mouth nightly as needed for Insomnia., Disp: 90 tablet, Rfl: 3    fluconazole (DIFLUCAN) 100 MG tablet, Take 1 tablet (100 mg total) by mouth once daily. for 5 days, Disp: 5 tablet, Rfl: 0    insulin (LANTUS SOLOSTAR U-100 INSULIN) glargine 100 units/mL SubQ pen, Inject 20 Units into the skin every evening., Disp: 6 mL, Rfl: 5    paroxetine (PAXIL) 20 MG tablet, Take 1 tablet (20 mg total) by mouth every morning., Disp: 90 tablet, Rfl: 3    pen needle, diabetic (BD GIUSEPPE 2ND GEN PEN NEEDLE) 32 gauge x 5/32" Ndle, 1 Device by Misc.(Non-Drug; Combo Route) route nightly., Disp: 100 each, Rfl: 11    Follow-up    Diabetes      Review of Systems   Constitutional: Negative.    HENT: Negative.     Eyes: Negative.    Respiratory: Negative.     Cardiovascular: Negative.    Gastrointestinal: Negative.    Endocrine: Negative.    Genitourinary: Negative.    Musculoskeletal:         Right thumb pain   Skin:         Dry skin   Allergic/Immunologic: Negative.    Neurological: Negative.    Hematological: Negative.    Psychiatric/Behavioral: Negative.         Objective:      Physical Exam  Vitals and nursing note reviewed. Exam conducted with a chaperone present (elderly female).   Constitutional:       General: She is not in acute distress.     Appearance: Normal appearance. She is well-developed. She is obese. She is not ill-appearing.   HENT:      Head: Normocephalic.   Eyes:      Conjunctiva/sclera: Conjunctivae normal.   Neck:      Thyroid: No thyromegaly.   Cardiovascular:      Rate and Rhythm: Normal rate and regular rhythm.      Heart sounds: Normal heart sounds. No murmur heard.  Pulmonary:      Effort: Pulmonary effort is normal.      Breath sounds: Normal breath sounds. No wheezing or rales.   Musculoskeletal:         General: Normal range of motion.      Cervical back: Normal range of motion.      Right lower leg: No edema.      Left lower leg: No " "edema.      Comments: Right thumb/hand with dec ROM.     Skin:     General: Skin is warm and dry.      Comments: Multiple circular dry patches noted resembling ringworm   Neurological:      Mental Status: She is alert and oriented to person, place, and time. Mental status is at baseline.   Psychiatric:         Mood and Affect: Mood normal.         Behavior: Behavior normal.         Thought Content: Thought content normal.         Judgment: Judgment normal.         Assessment:       1. Type 2 diabetes mellitus with hyperlipidemia    2. Anxiety and depression    3. Dermatophytosis of body    4. Right hand pain    5. Vitamin D deficiency    6. High risk medication use        Plan:     1- start lantus at 10 units nightly  2- diflucan for dermatophytosis   3- refer to ortho for right hand pain  4- RTC 2-4 weeks with NP for medication reconciliation.   5- ref to dietician. Pt interested in CGM  6- RTC 3 mo with fasting labs before visit  -  Type 2 diabetes mellitus with hyperlipidemia  -     Ambulatory referral/consult to Diabetes Education; Future; Expected date: 03/25/2024  -     insulin (LANTUS SOLOSTAR U-100 INSULIN) glargine 100 units/mL SubQ pen; Inject 20 Units into the skin every evening.  Dispense: 6 mL; Refill: 5  -     pen needle, diabetic (BD GIUSEPPE 2ND GEN PEN NEEDLE) 32 gauge x 5/32" Ndle; 1 Device by Misc.(Non-Drug; Combo Route) route nightly.  Dispense: 100 each; Refill: 11  -     Hemoglobin A1C; Future; Expected date: 06/18/2024  -     Lipid Panel; Future; Expected date: 06/18/2024  -     CBC Auto Differential; Future; Expected date: 06/18/2024  -     Comprehensive Metabolic Panel; Future; Expected date: 06/18/2024    Anxiety and depression  -     paroxetine (PAXIL) 20 MG tablet; Take 1 tablet (20 mg total) by mouth every morning.  Dispense: 90 tablet; Refill: 3    Dermatophytosis of body  -     fluconazole (DIFLUCAN) 100 MG tablet; Take 1 tablet (100 mg total) by mouth once daily. for 5 days  Dispense: 5 " tablet; Refill: 0    Right hand pain  -     Ambulatory referral/consult to Orthopedics; Future; Expected date: 03/25/2024    Vitamin D deficiency  -     Vitamin D; Future; Expected date: 06/18/2024    High risk medication use  -     CBC Auto Differential; Future; Expected date: 06/18/2024  -     Comprehensive Metabolic Panel; Future; Expected date: 06/18/2024  -     TSH; Future; Expected date: 06/18/2024        Risks, benefits, and side effects were discussed with the patient. All questions were answered to the fullest satisfaction of the patient, and pt verbalized understanding and agreement to treatment plan. Pt was to call with any new or worsening symptoms, or present to the ER.

## 2024-03-19 ENCOUNTER — TELEPHONE (OUTPATIENT)
Dept: ORTHOPEDICS | Facility: CLINIC | Age: 74
End: 2024-03-19
Payer: MEDICARE

## 2024-03-19 NOTE — TELEPHONE ENCOUNTER
----- Message from Robert Fajardo MA sent at 3/19/2024  8:00 AM CDT -----  Contact: patient  Patient would like to schedule for right hand pain.  Referral in system for: Right hand pain [M79.641] from PCP office.    Call back number is [phone]

## 2024-03-29 DIAGNOSIS — I10 ESSENTIAL HYPERTENSION: ICD-10-CM

## 2024-04-01 ENCOUNTER — OFFICE VISIT (OUTPATIENT)
Dept: ORTHOPEDICS | Facility: CLINIC | Age: 74
End: 2024-04-01
Payer: MEDICARE

## 2024-04-01 VITALS — BODY MASS INDEX: 33.47 KG/M2 | HEART RATE: 92 BPM | OXYGEN SATURATION: 99 % | HEIGHT: 62 IN | WEIGHT: 181.88 LBS

## 2024-04-01 DIAGNOSIS — M79.641 RIGHT HAND PAIN: ICD-10-CM

## 2024-04-01 DIAGNOSIS — M65.331 TRIGGER MIDDLE FINGER OF RIGHT HAND: Primary | ICD-10-CM

## 2024-04-01 PROCEDURE — 20550 NJX 1 TENDON SHEATH/LIGAMENT: CPT | Mod: RT,S$GLB,, | Performed by: ORTHOPAEDIC SURGERY

## 2024-04-01 PROCEDURE — 3046F HEMOGLOBIN A1C LEVEL >9.0%: CPT | Mod: CPTII,S$GLB,, | Performed by: ORTHOPAEDIC SURGERY

## 2024-04-01 PROCEDURE — 1125F AMNT PAIN NOTED PAIN PRSNT: CPT | Mod: CPTII,S$GLB,, | Performed by: ORTHOPAEDIC SURGERY

## 2024-04-01 PROCEDURE — 1157F ADVNC CARE PLAN IN RCRD: CPT | Mod: CPTII,S$GLB,, | Performed by: ORTHOPAEDIC SURGERY

## 2024-04-01 PROCEDURE — 99214 OFFICE O/P EST MOD 30 MIN: CPT | Mod: 25,S$GLB,, | Performed by: ORTHOPAEDIC SURGERY

## 2024-04-01 PROCEDURE — 1160F RVW MEDS BY RX/DR IN RCRD: CPT | Mod: CPTII,S$GLB,, | Performed by: ORTHOPAEDIC SURGERY

## 2024-04-01 PROCEDURE — 99999 PR PBB SHADOW E&M-EST. PATIENT-LVL V: CPT | Mod: PBBFAC,,, | Performed by: ORTHOPAEDIC SURGERY

## 2024-04-01 PROCEDURE — 3008F BODY MASS INDEX DOCD: CPT | Mod: CPTII,S$GLB,, | Performed by: ORTHOPAEDIC SURGERY

## 2024-04-01 PROCEDURE — 1159F MED LIST DOCD IN RCRD: CPT | Mod: CPTII,S$GLB,, | Performed by: ORTHOPAEDIC SURGERY

## 2024-04-01 PROCEDURE — 3288F FALL RISK ASSESSMENT DOCD: CPT | Mod: CPTII,S$GLB,, | Performed by: ORTHOPAEDIC SURGERY

## 2024-04-01 PROCEDURE — 1101F PT FALLS ASSESS-DOCD LE1/YR: CPT | Mod: CPTII,S$GLB,, | Performed by: ORTHOPAEDIC SURGERY

## 2024-04-01 RX ORDER — TRIAMCINOLONE ACETONIDE 40 MG/ML
40 INJECTION, SUSPENSION INTRA-ARTICULAR; INTRAMUSCULAR
Status: DISCONTINUED | OUTPATIENT
Start: 2024-04-01 | End: 2024-04-01 | Stop reason: HOSPADM

## 2024-04-01 RX ORDER — AMLODIPINE BESYLATE 10 MG/1
10 TABLET ORAL NIGHTLY
Qty: 90 TABLET | Refills: 3 | Status: SHIPPED | OUTPATIENT
Start: 2024-04-01 | End: 2024-04-03 | Stop reason: SDUPTHER

## 2024-04-01 RX ADMIN — TRIAMCINOLONE ACETONIDE 40 MG: 40 INJECTION, SUSPENSION INTRA-ARTICULAR; INTRAMUSCULAR at 10:04

## 2024-04-01 NOTE — TELEPHONE ENCOUNTER
Refill Routing Note   Medication(s) are not appropriate for processing by [facility] for the following reason(s):        Non-participating provider    [facility] action(s):  Route               Appointments  past 12m or future 3m with PCP    Date Provider   Last Visit   3/18/2024 Sonya Blackman NP   Next Visit   4/3/2024 Sonya Blackman NP   ED visits in past 90 days: 0        Note composed:11:40 AM 04/01/2024

## 2024-04-01 NOTE — PROCEDURES
Tendon Sheath    Date/Time: 4/1/2024 10:15 AM    Performed by: Jona Flowers MD  Authorized by: Jona Flowers MD    Consent Done?:  Yes (Verbal)  Indications:  Pain  Site marked: the procedure site was marked    Timeout: prior to procedure the correct patient, procedure, and site was verified    Prep: patient was prepped and draped in usual sterile fashion      Local anesthesia used?: Yes    Anesthesia:  Local infiltration  Local anesthetic:  Lidocaine 1% without epinephrine and bupivacaine 0.25% without epinephrine  Anesthetic total (ml):  2    Location:  Long finger  Site:  R long flexor tendon sheath  Ultrasonic guidance for needle placement?: No    Needle size:  25 G  Approach:  Volar  Medications:  40 mg triamcinolone acetonide 40 mg/mL  Patient tolerance:  Patient tolerated the procedure well with no immediate complications

## 2024-04-01 NOTE — PROGRESS NOTES
Subjective:      Patient ID: Argelia Pyle is a 73 y.o. female.    Chief Complaint: Pain of the Right Hand    HPI  73-year-old female long history of multiple complaints of the right hand.  She states she was told she would carpal tunnel syndrome.  She was told that she had arthritis and she is complaining of catching locking and clicking of the long finger.  She has generalized right upper extremity pain.  There has been no trauma.  Denies neck pain.  ROS      Objective:    Ortho Exam     Constitutional:   Patient is alert  and oriented in no acute distress  HEENT:  normocephalic atraumatic; PERRL EOMI  Neck:  Supple without adenopathy  Cardiovascular:  Normal rate and rhythm  Pulmonary:  Normal respiratory effort normal chest wall expansion  Abdominal:  Nonprotuberant nondistended  Musculoskeletal:  Patient has adequate cervical range of motion without tenderness  With a negative Lhermitte's and Spurling sign.  Upon gross inspection of the hands  There is no mass, swelling, she does have some subtle right thenar atrophy.  Strength is 5/5 including APB strength testing  Negative Finkelstein's and CMC grind.  Positive carpal tunnel compression test  Negative Tinel's.  Equivocal Phalen's positive triggering of her right long finger  Neurological:  No focal defect; cranial nerves 2-12 grossly intact  Psychiatric/behavioral:  Mood and behavior normal           My Radiographs Findings:    No new radiographs today.  Assessment:       Encounter Diagnoses   Name Primary?    Right hand pain     Trigger middle finger of right hand Yes         Plan:       I have discussed medical condition treatment options with her at length.  Her pain is multifactorial.  Most symptomatic for her today is her trigger digits.  After a verbal consent and sterile prep I injected her right long finger at the A1 pulley region without complication with a combination of cc of Kenalog several cc of lidocaine I have suggested night splints and  follow up in 6 weeks.  If other symptoms persist may need to consider nerve studies she already appears to have some thenar atrophy.  I will see her back in 6 weeks sooner if any questions or problems        Past Medical History:   Diagnosis Date    Anxiety     Arthritis     Chronic sinusitis     Diabetes mellitus     Hypertension      Past Surgical History:   Procedure Laterality Date    BREAST BIOPSY      BREAST LUMPECTOMY Left     BUNIONECTOMY Bilateral     COLONOSCOPY      ESOPHAGOGASTRODUODENOSCOPY      ETHMOIDECTOMY Bilateral 02/07/2019    Procedure: ETHMOIDECTOMY;  Surgeon: Michael Grubbs MD;  Location: [facility] OR;  Service: ENT;  Laterality: Bilateral;  endoscopic anterior and posterior    FUNCTIONAL ENDOSCOPIC SINUS SURGERY (FESS) N/A 02/07/2019    Procedure: FESS (FUNCTIONAL ENDOSCOPIC SINUS SURGERY);  Surgeon: Michael Grubbs MD;  Location: [facility] OR;  Service: ENT;  Laterality: N/A;    FUNCTIONAL ENDOSCOPIC SINUS SURGERY (FESS) Bilateral 12/19/2019    Procedure: FESS (FUNCTIONAL ENDOSCOPIC SINUS SURGERY);  Surgeon: Michael Grubbs MD;  Location: [facility] OR;  Service: ENT;  Laterality: Bilateral;    MAXILLARY ANTROSTOMY Bilateral 02/07/2019    Procedure: MAXILLARY ANTROSTOMY;  Surgeon: Michael Grubbs MD;  Location: [facility] OR;  Service: ENT;  Laterality: Bilateral;    NASAL SEPTOPLASTY Bilateral 02/07/2019    Procedure: SEPTOPLASTY, NOSE;  Surgeon: Michael Grubbs MD;  Location: [facility] OR;  Service: ENT;  Laterality: Bilateral;    NASAL TURBINATE REDUCTION Bilateral 02/07/2019    Procedure: REDUCTION, NASAL TURBINATE;  Surgeon: Michael Grubbs MD;  Location: [facility] OR;  Service: ENT;  Laterality: Bilateral;    SINUS SURGERY      x4    TUBAL LIGATION      WRIST SURGERY Left 2016    [facility]-Dr[facility] Smalls         Current Outpatient Medications:     albuterol (PROVENTIL/VENTOLIN HFA) 90 mcg/actuation inhaler, Inhale 2 puffs into the lungs every 4 (four) hours as needed for Wheezing or Shortness of  Breath., Disp: 18 g, Rfl: 11    amLODIPine (NORVASC) 10 MG tablet, Take 1 tablet (10 mg total) by mouth every evening., Disp: 90 tablet, Rfl: 1    aspirin (ECOTRIN) 81 MG EC tablet, Take 81 mg by mouth every evening., Disp: , Rfl:     azelastine (ASTELIN) 137 mcg (0.1 %) nasal spray, 1 spray (137 mcg total) by Nasal route 2 (two) times daily as needed for Rhinitis., Disp: 30 mL, Rfl: 11    blood sugar diagnostic Strp, To check BG 1 time daily, to use with insurance preferred meter, Disp: 200 strip, Rfl: 11    blood-glucose meter kit, To check BG 1 time daily, to use with insurance preferred meter, Disp: 1 each, Rfl: 0    busPIRone (BUSPAR) 15 MG tablet, Take 1 tablet (15 mg total) by mouth 2 (two) times daily., Disp: 60 tablet, Rfl: 5    cetirizine (ZYRTEC) 10 MG tablet, Take 1 tablet (10 mg total) by mouth daily as needed for Allergies., Disp: 90 tablet, Rfl: 3    diclofenac sodium (VOLTAREN) 1 % Gel, APPLY TOPICALLY TO THE AFFECTED AREA FOUR TIMES DAILY, Disp: , Rfl:     empagliflozin (JARDIANCE) 25 mg tablet, Take 1 tablet (25 mg total) by mouth once daily., Disp: 30 tablet, Rfl: 5    fluticasone propionate (FLONASE) 50 mcg/actuation nasal spray, 2 sprays (100 mcg total) by Each Nostril route daily as needed for Rhinitis., Disp: 15.8 mL, Rfl: 11    glipiZIDE (GLUCOTROL) 10 MG tablet, Take 1 tablet (10 mg total) by mouth 2 (two) times daily before meals., Disp: 180 tablet, Rfl: 3    ibuprofen (ADVIL,MOTRIN) 800 MG tablet, Take 1 tablet (800 mg total) by mouth 2 (two) times daily as needed for Pain., Disp: 60 tablet, Rfl: 5    insulin (LANTUS SOLOSTAR U-100 INSULIN) glargine 100 units/mL SubQ pen, Inject 20 Units into the skin every evening., Disp: 6 mL, Rfl: 5    lancets Misc, To check BG 1 time daily, to use with insurance preferred meter, Disp: 200 each, Rfl: 0    losartan-hydrochlorothiazide 100-25 mg (HYZAAR) 100-25 mg per tablet, Take 1 tablet by mouth every morning., Disp: 90 tablet, Rfl: 3    metFORMIN  "(GLUCOPHAGE) 1000 MG tablet, Take 1,000 mg by mouth 2 (two) times daily., Disp: , Rfl:     metoprolol tartrate (LOPRESSOR) 50 MG tablet, Take 1 tablet (50 mg total) by mouth 2 (two) times daily., Disp: 180 tablet, Rfl: 3    montelukast (SINGULAIR) 10 mg tablet, Take 1 tablet (10 mg total) by mouth every evening., Disp: 30 tablet, Rfl: 5    mupirocin (BACTROBAN) 2 % ointment, Apply topically 2 (two) times daily., Disp: , Rfl:     pantoprazole (PROTONIX) 20 MG tablet, Take 1 tablet (20 mg total) by mouth once daily., Disp: 30 tablet, Rfl: 5    paroxetine (PAXIL) 20 MG tablet, Take 1 tablet (20 mg total) by mouth every morning., Disp: 90 tablet, Rfl: 3    pen needle, diabetic (BD GIUSEPPE 2ND GEN PEN NEEDLE) 32 gauge x 5/32" Ndle, 1 Device by Misc.(Non-Drug; Combo Route) route nightly., Disp: 100 each, Rfl: 11    pregabalin (LYRICA) 75 MG capsule, Take 1 capsule (75 mg total) by mouth 2 (two) times daily., Disp: 180 capsule, Rfl: 1    simvastatin (ZOCOR) 20 MG tablet, Take 1 tablet (20 mg total) by mouth nightly., Disp: 90 tablet, Rfl: 3    traZODone (DESYREL) 50 MG tablet, Take 1 tablet (50 mg total) by mouth nightly as needed for Insomnia., Disp: 90 tablet, Rfl: 3    Review of patient's allergies indicates:   Allergen Reactions    Cefuroxime Rash    Ketorolac tromethamine Other (See Comments)     Other reaction(s): Burning    Moxifloxacin Other (See Comments)     Heart racing.    Codeine Itching    Famotidine-ca carb-mag hydrox Hives    Ketorolac Other (See Comments)    Prevacid [lansoprazole] Itching       Family History   Problem Relation Age of Onset    Leukemia Son     Breast cancer Neg Hx      Social History     Occupational History    Not on file   Tobacco Use    Smoking status: Former     Current packs/day: 0.00     Average packs/day: 0.8 packs/day for 25.0 years (18.8 ttl pk-yrs)     Types: Cigarettes     Start date: 1974     Quit date: 1999     Years since quittin.9    Smokeless tobacco: Never "   Substance and Sexual Activity    Alcohol use: Yes     Comment: social    Drug use: Yes     Types: Other-see comments     Comment: tramadol as prescribed    Sexual activity: Not Currently     Birth control/protection: See Surgical Hx

## 2024-04-02 ENCOUNTER — CLINICAL SUPPORT (OUTPATIENT)
Dept: DIABETES | Facility: CLINIC | Age: 74
End: 2024-04-02
Payer: MEDICARE

## 2024-04-02 VITALS — BODY MASS INDEX: 33.82 KG/M2 | WEIGHT: 183.81 LBS | HEIGHT: 62 IN

## 2024-04-02 DIAGNOSIS — E78.5 TYPE 2 DIABETES MELLITUS WITH HYPERLIPIDEMIA: ICD-10-CM

## 2024-04-02 DIAGNOSIS — E11.69 TYPE 2 DIABETES MELLITUS WITH HYPERLIPIDEMIA: ICD-10-CM

## 2024-04-02 PROCEDURE — G0108 DIAB MANAGE TRN  PER INDIV: HCPCS | Mod: S$GLB,,, | Performed by: DIETITIAN, REGISTERED

## 2024-04-03 ENCOUNTER — OFFICE VISIT (OUTPATIENT)
Dept: FAMILY MEDICINE | Facility: CLINIC | Age: 74
End: 2024-04-03
Payer: MEDICARE

## 2024-04-03 VITALS
OXYGEN SATURATION: 95 % | BODY MASS INDEX: 33.31 KG/M2 | RESPIRATION RATE: 18 BRPM | SYSTOLIC BLOOD PRESSURE: 132 MMHG | HEART RATE: 67 BPM | DIASTOLIC BLOOD PRESSURE: 82 MMHG | WEIGHT: 181 LBS | HEIGHT: 62 IN

## 2024-04-03 DIAGNOSIS — F51.01 PRIMARY INSOMNIA: ICD-10-CM

## 2024-04-03 DIAGNOSIS — E11.65 TYPE 2 DIABETES MELLITUS WITH HYPERGLYCEMIA, WITH LONG-TERM CURRENT USE OF INSULIN: Primary | ICD-10-CM

## 2024-04-03 DIAGNOSIS — I10 ESSENTIAL HYPERTENSION: ICD-10-CM

## 2024-04-03 DIAGNOSIS — F41.9 ANXIETY AND DEPRESSION: ICD-10-CM

## 2024-04-03 DIAGNOSIS — M25.531 RIGHT WRIST PAIN: ICD-10-CM

## 2024-04-03 DIAGNOSIS — M54.50 LUMBAR PAIN: ICD-10-CM

## 2024-04-03 DIAGNOSIS — F41.1 GAD (GENERALIZED ANXIETY DISORDER): ICD-10-CM

## 2024-04-03 DIAGNOSIS — Z79.4 TYPE 2 DIABETES MELLITUS WITH HYPERGLYCEMIA, WITH LONG-TERM CURRENT USE OF INSULIN: Primary | ICD-10-CM

## 2024-04-03 DIAGNOSIS — F32.A ANXIETY AND DEPRESSION: ICD-10-CM

## 2024-04-03 DIAGNOSIS — Z79.899 POLYPHARMACY: ICD-10-CM

## 2024-04-03 PROCEDURE — 3079F DIAST BP 80-89 MM HG: CPT | Mod: CPTII,S$GLB,, | Performed by: NURSE PRACTITIONER

## 2024-04-03 PROCEDURE — 1160F RVW MEDS BY RX/DR IN RCRD: CPT | Mod: CPTII,S$GLB,, | Performed by: NURSE PRACTITIONER

## 2024-04-03 PROCEDURE — 3046F HEMOGLOBIN A1C LEVEL >9.0%: CPT | Mod: CPTII,S$GLB,, | Performed by: NURSE PRACTITIONER

## 2024-04-03 PROCEDURE — 1159F MED LIST DOCD IN RCRD: CPT | Mod: CPTII,S$GLB,, | Performed by: NURSE PRACTITIONER

## 2024-04-03 PROCEDURE — 99213 OFFICE O/P EST LOW 20 MIN: CPT | Mod: S$GLB,,, | Performed by: NURSE PRACTITIONER

## 2024-04-03 PROCEDURE — 1101F PT FALLS ASSESS-DOCD LE1/YR: CPT | Mod: CPTII,S$GLB,, | Performed by: NURSE PRACTITIONER

## 2024-04-03 PROCEDURE — 3288F FALL RISK ASSESSMENT DOCD: CPT | Mod: CPTII,S$GLB,, | Performed by: NURSE PRACTITIONER

## 2024-04-03 PROCEDURE — 3008F BODY MASS INDEX DOCD: CPT | Mod: CPTII,S$GLB,, | Performed by: NURSE PRACTITIONER

## 2024-04-03 PROCEDURE — 3075F SYST BP GE 130 - 139MM HG: CPT | Mod: CPTII,S$GLB,, | Performed by: NURSE PRACTITIONER

## 2024-04-03 PROCEDURE — 99999 PR PBB SHADOW E&M-EST. PATIENT-LVL IV: CPT | Mod: PBBFAC,,, | Performed by: NURSE PRACTITIONER

## 2024-04-03 PROCEDURE — 1125F AMNT PAIN NOTED PAIN PRSNT: CPT | Mod: CPTII,S$GLB,, | Performed by: NURSE PRACTITIONER

## 2024-04-03 PROCEDURE — 1157F ADVNC CARE PLAN IN RCRD: CPT | Mod: CPTII,S$GLB,, | Performed by: NURSE PRACTITIONER

## 2024-04-03 RX ORDER — DICLOFENAC SODIUM 10 MG/G
GEL TOPICAL
Qty: 150 G | Refills: 5 | Status: SHIPPED | OUTPATIENT
Start: 2024-04-03

## 2024-04-03 RX ORDER — ASPIRIN 81 MG/1
81 TABLET ORAL NIGHTLY
Qty: 90 TABLET | Refills: 3 | Status: SHIPPED | OUTPATIENT
Start: 2024-04-03

## 2024-04-03 RX ORDER — METOPROLOL TARTRATE 50 MG/1
50 TABLET ORAL 2 TIMES DAILY
Qty: 180 TABLET | Refills: 3
Start: 2024-04-03

## 2024-04-03 RX ORDER — TRAZODONE HYDROCHLORIDE 50 MG/1
50 TABLET ORAL NIGHTLY PRN
Qty: 90 TABLET | Refills: 3 | Status: SHIPPED | OUTPATIENT
Start: 2024-04-03 | End: 2024-05-07

## 2024-04-03 RX ORDER — CYCLOBENZAPRINE HCL 5 MG
5 TABLET ORAL 3 TIMES DAILY PRN
COMMUNITY
End: 2024-04-03 | Stop reason: SDUPTHER

## 2024-04-03 RX ORDER — BUSPIRONE HYDROCHLORIDE 15 MG/1
15 TABLET ORAL 2 TIMES DAILY
Qty: 180 TABLET | Refills: 1 | Status: SHIPPED | OUTPATIENT
Start: 2024-04-03

## 2024-04-03 RX ORDER — PAROXETINE HYDROCHLORIDE 20 MG/1
20 TABLET, FILM COATED ORAL NIGHTLY
Qty: 90 TABLET | Refills: 3 | Status: SHIPPED | OUTPATIENT
Start: 2024-04-03 | End: 2025-04-03

## 2024-04-03 RX ORDER — AMLODIPINE BESYLATE 10 MG/1
10 TABLET ORAL NIGHTLY
Qty: 90 TABLET | Refills: 3 | Status: SHIPPED | OUTPATIENT
Start: 2024-04-03

## 2024-04-03 RX ORDER — CYCLOBENZAPRINE HCL 5 MG
5 TABLET ORAL 3 TIMES DAILY PRN
Qty: 30 TABLET | Refills: 5 | Status: SHIPPED | OUTPATIENT
Start: 2024-04-03

## 2024-04-03 NOTE — PROGRESS NOTES
Subjective:       Patient ID: Argelia Pyle is a 73 y.o. female.    Chief Complaint: Follow-up  -  The pt is a 72 y/o female that presents for follow up. DM A1c 9.4 up from 8.8% referred to dietician yet has not been seen yet. Not reassured pt is taking meds as prescribed. Is taking lantus 20 units qhs checking her glucose 2-3 times a day. Medi-planner discussed with family to assist with filling. We have discussed her using a CGM for better controll and device memory for accuracy of readings to improve A1c. Have considered rapid insulin with meals yet due to memory not sure if compliance is the issue.   -    Past Medical History:   Diagnosis Date    Anxiety     Arthritis     Chronic sinusitis     Diabetes mellitus     Hypertension        Past Surgical History:   Procedure Laterality Date    BREAST BIOPSY      BREAST LUMPECTOMY Left     BUNIONECTOMY Bilateral     COLONOSCOPY      ESOPHAGOGASTRODUODENOSCOPY      ETHMOIDECTOMY Bilateral 02/07/2019    Procedure: ETHMOIDECTOMY;  Surgeon: Michael Grubbs MD;  Location: [facility] OR;  Service: ENT;  Laterality: Bilateral;  endoscopic anterior and posterior    FUNCTIONAL ENDOSCOPIC SINUS SURGERY (FESS) N/A 02/07/2019    Procedure: FESS (FUNCTIONAL ENDOSCOPIC SINUS SURGERY);  Surgeon: Michael Grubbs MD;  Location: [facility] OR;  Service: ENT;  Laterality: N/A;    FUNCTIONAL ENDOSCOPIC SINUS SURGERY (FESS) Bilateral 12/19/2019    Procedure: FESS (FUNCTIONAL ENDOSCOPIC SINUS SURGERY);  Surgeon: Michael Grubbs MD;  Location: [facility] OR;  Service: ENT;  Laterality: Bilateral;    MAXILLARY ANTROSTOMY Bilateral 02/07/2019    Procedure: MAXILLARY ANTROSTOMY;  Surgeon: Michael Grubsb MD;  Location: [facility] OR;  Service: ENT;  Laterality: Bilateral;    NASAL SEPTOPLASTY Bilateral 02/07/2019    Procedure: SEPTOPLASTY, NOSE;  Surgeon: Michael Grubbs MD;  Location: [facility] OR;  Service: ENT;  Laterality: Bilateral;    NASAL TURBINATE REDUCTION Bilateral 02/07/2019    Procedure:  REDUCTION, NASAL TURBINATE;  Surgeon: Michael Grubbs MD;  Location: [facility] OR;  Service: ENT;  Laterality: Bilateral;    SINUS SURGERY      x4    TUBAL LIGATION      WRIST SURGERY Left 2016    [facility]Dr[facility] Sm[facility]        Social History     Socioeconomic History    Marital status:    Tobacco Use    Smoking status: Former     Current packs/day: 0.00     Average packs/day: 0.8 packs/day for 25.0 years (18.8 ttl pk-yrs)     Types: Cigarettes     Start date: 1974     Quit date: 1999     Years since quittin.0    Smokeless tobacco: Never   Substance and Sexual Activity    Alcohol use: Yes     Comment: social    Drug use: Yes     Types: Other-see comments     Comment: tramadol as prescribed    Sexual activity: Not Currently     Birth control/protection: See Surgical Hx       Family History   Problem Relation Name Age of Onset    Leukemia Son      Breast cancer Neg Hx         Review of patient's allergies indicates:   Allergen Reactions    Cefuroxime Rash    Ketorolac tromethamine Other (See Comments)     Other reaction(s): Burning    Moxifloxacin Other (See Comments)     Heart racing.    Codeine Itching    Famotidine-ca carb-mag hydrox Hives    Ketorolac Other (See Comments)    Prevacid [lansoprazole] Itching          Current Outpatient Medications:     albuterol (PROVENTIL/VENTOLIN HFA) 90 mcg/actuation inhaler, Inhale 2 puffs into the lungs every 4 (four) hours as needed for Wheezing or Shortness of Breath., Disp: 18 g, Rfl: 11    ibuprofen (ADVIL,MOTRIN) 800 MG tablet, Take 1 tablet (800 mg total) by mouth 2 (two) times daily as needed for Pain., Disp: 60 tablet, Rfl: 5    insulin (LANTUS SOLOSTAR U-100 INSULIN) glargine 100 units/mL SubQ pen, Inject 20 Units into the skin every evening., Disp: 6 mL, Rfl: 5    amLODIPine (NORVASC) 10 MG tablet, Take 1 tablet (10 mg total) by mouth every evening., Disp: 90 tablet, Rfl: 3    aspirin (ECOTRIN) 81 MG EC tablet, Take 1 tablet (81 mg total) by  mouth every evening., Disp: 90 tablet, Rfl: 3    azelastine (ASTELIN) 137 mcg (0.1 %) nasal spray, 1 spray (137 mcg total) by Nasal route 2 (two) times daily as needed for Rhinitis., Disp: 30 mL, Rfl: 11    blood sugar diagnostic Strp, To check BG 1 time daily, to use with insurance preferred meter, Disp: 200 strip, Rfl: 11    blood-glucose meter kit, To check BG 1 time daily, to use with insurance preferred meter, Disp: 1 each, Rfl: 0    busPIRone (BUSPAR) 15 MG tablet, Take 1 tablet (15 mg total) by mouth 2 (two) times daily., Disp: 180 tablet, Rfl: 1    cetirizine (ZYRTEC) 10 MG tablet, Take 1 tablet (10 mg total) by mouth daily as needed for Allergies., Disp: 90 tablet, Rfl: 3    cyclobenzaprine (FLEXERIL) 5 MG tablet, Take 1 tablet (5 mg total) by mouth 3 (three) times daily as needed for Muscle spasms., Disp: 30 tablet, Rfl: 5    diclofenac sodium (VOLTAREN) 1 % Gel, APPLY TOPICALLY TO THE AFFECTED AREA FOUR TIMES DAILY, Disp: 150 g, Rfl: 5    empagliflozin (JARDIANCE) 25 mg tablet, Take 1 tablet (25 mg total) by mouth once daily., Disp: 30 tablet, Rfl: 5    fluticasone propionate (FLONASE) 50 mcg/actuation nasal spray, 2 sprays (100 mcg total) by Each Nostril route daily as needed for Rhinitis., Disp: 15.8 mL, Rfl: 11    glipiZIDE (GLUCOTROL) 10 MG tablet, Take 1 tablet (10 mg total) by mouth 2 (two) times daily before meals., Disp: 180 tablet, Rfl: 3    lancets Misc, To check BG 1 time daily, to use with insurance preferred meter, Disp: 200 each, Rfl: 0    losartan-hydrochlorothiazide 100-25 mg (HYZAAR) 100-25 mg per tablet, Take 1 tablet by mouth every morning., Disp: 90 tablet, Rfl: 3    metFORMIN (GLUCOPHAGE) 1000 MG tablet, Take 1,000 mg by mouth 2 (two) times daily., Disp: , Rfl:     metoprolol tartrate (LOPRESSOR) 50 MG tablet, Take 1 tablet (50 mg total) by mouth 2 (two) times daily., Disp: 180 tablet, Rfl: 3    paroxetine (PAXIL) 20 MG tablet, Take 1 tablet (20 mg total) by mouth every evening.,  "Disp: 90 tablet, Rfl: 3    pen needle, diabetic (BD GIUSEPPE 2ND GEN PEN NEEDLE) 32 gauge x 5/32" Ndle, 1 Device by Misc.(Non-Drug; Combo Route) route nightly., Disp: 100 each, Rfl: 11    pregabalin (LYRICA) 75 MG capsule, Take 1 capsule (75 mg total) by mouth 2 (two) times daily., Disp: 180 capsule, Rfl: 1    simvastatin (ZOCOR) 20 MG tablet, Take 1 tablet (20 mg total) by mouth nightly., Disp: 90 tablet, Rfl: 3    traZODone (DESYREL) 50 MG tablet, Take 1 tablet (50 mg total) by mouth nightly as needed for Insomnia., Disp: 90 tablet, Rfl: 3    HPI  Review of Systems   Constitutional: Negative.    HENT: Negative.     Eyes: Negative.    Respiratory: Negative.     Cardiovascular: Negative.    Gastrointestinal: Negative.    Endocrine: Negative.    Genitourinary: Negative.    Musculoskeletal: Negative.    Skin: Negative.    Allergic/Immunologic: Negative.    Neurological: Negative.    Hematological: Negative.    Psychiatric/Behavioral: Negative.         Objective:      Physical Exam  Vitals and nursing note reviewed.   Constitutional:       General: She is not in acute distress.     Appearance: Normal appearance. She is well-developed. She is obese. She is not ill-appearing.   HENT:      Head: Normocephalic.   Eyes:      Conjunctiva/sclera: Conjunctivae normal.   Neck:      Thyroid: No thyromegaly.   Cardiovascular:      Rate and Rhythm: Normal rate and regular rhythm.      Heart sounds: Normal heart sounds. No murmur heard.  Pulmonary:      Effort: Pulmonary effort is normal.      Breath sounds: Normal breath sounds. No wheezing or rales.   Musculoskeletal:         General: Normal range of motion.      Cervical back: Normal range of motion.      Right lower leg: No edema.      Left lower leg: No edema.   Skin:     General: Skin is warm and dry.   Neurological:      Mental Status: She is alert and oriented to person, place, and time. Mental status is at baseline.   Psychiatric:         Mood and Affect: Mood normal.         " Behavior: Behavior normal.         Thought Content: Thought content normal.         Judgment: Judgment normal.         Assessment:       1. Type 2 diabetes mellitus with hyperglycemia, with long-term current use of insulin    2. Primary insomnia    3. Anxiety and depression    4. Polypharmacy    5. Right wrist pain    6. Lumbar pain    7. MIKO (generalized anxiety disorder)    8. Essential hypertension        Plan:     1- fasting labs Mid June then follow up with NP  2- Meds refilled  3- see dietician for edu/CGM eligibility (4/25/24 apt)  -     Type 2 diabetes mellitus with hyperglycemia, with long-term current use of insulin    Primary insomnia  -     traZODone (DESYREL) 50 MG tablet; Take 1 tablet (50 mg total) by mouth nightly as needed for Insomnia.  Dispense: 90 tablet; Refill: 3    Anxiety and depression  -     paroxetine (PAXIL) 20 MG tablet; Take 1 tablet (20 mg total) by mouth every evening.  Dispense: 90 tablet; Refill: 3    Polypharmacy    Right wrist pain  -     diclofenac sodium (VOLTAREN) 1 % Gel; APPLY TOPICALLY TO THE AFFECTED AREA FOUR TIMES DAILY  Dispense: 150 g; Refill: 5    Lumbar pain  -     cyclobenzaprine (FLEXERIL) 5 MG tablet; Take 1 tablet (5 mg total) by mouth 3 (three) times daily as needed for Muscle spasms.  Dispense: 30 tablet; Refill: 5    MIKO (generalized anxiety disorder)  -     busPIRone (BUSPAR) 15 MG tablet; Take 1 tablet (15 mg total) by mouth 2 (two) times daily.  Dispense: 180 tablet; Refill: 1    Essential hypertension  -     amLODIPine (NORVASC) 10 MG tablet; Take 1 tablet (10 mg total) by mouth every evening.  Dispense: 90 tablet; Refill: 3  -     metoprolol tartrate (LOPRESSOR) 50 MG tablet; Take 1 tablet (50 mg total) by mouth 2 (two) times daily.  Dispense: 180 tablet; Refill: 3    Other orders  -     aspirin (ECOTRIN) 81 MG EC tablet; Take 1 tablet (81 mg total) by mouth every evening.  Dispense: 90 tablet; Refill: 3        Risks, benefits, and side effects were  discussed with the patient. All questions were answered to the fullest satisfaction of the patient, and pt verbalized understanding and agreement to treatment plan. Pt was to call with any new or worsening symptoms, or present to the ER.

## 2024-04-03 NOTE — PROGRESS NOTES
"Diabetes Care Specialist Progress Note  Author: Arlen Pedraza RD  Date: 4/2/2024    Program Intake  Reason for Diabetes Program Visit:: Initial Diabetes Assessment  Current diabetes risk level:: moderate  In the last 12 months, have you:: used emergency room services  Was the ER or hospital admission related to diabetes?: No  Permission to speak with others about care:: yes (her friend/"adopted mother" Mrs Samuel is here with patient today)  Continuous Glucose Monitoring  Patient has CGM: No    Lab Results   Component Value Date    HGBA1C 9.4 (H) 03/13/2024       Clinical    Weight: 83.4 kg (183 lb 12.8 oz)   Height: 5' 2" (157.5 cm)   Body mass index is 33.62 kg/m².         Problem Review  Reviewed Problem List with Patient: yes  Active comorbidities affecting diabetes self-care.: yes  Comorbidities: Hypertension  Reviewed health maintenance: yes    Clinical Assessment  Current Diabetes Treatment: Oral Medication, Insulin (Jardiance 25 mg daily, glipizide 10 mg twice daily before meals, Metformin 1,000 mg twice daily, patient just started on Lantus 20 units mid-day)  Have you ever experienced hypoglycemia (low blood sugar)?: yes  In the last month, how often have you experienced low blood sugar?: other (see comments) (patient reported getting low at night when she would take her insulin so she switched it to mid-day and has been better with these lows)  Are you able to tell when your blood sugar is low?: Yes  What symptoms do you experience?: Shaky, Other (confused)  Have you ever been hospitalized because your blood sugar was too low?: no  How do you treat hypoglycemia (low blood sugar)?: 1/2 can soda/fruit juice  Have you ever experienced hyperglycemia (high blood sugar)?: yes  In the last month, how often have you experienced high blood sugar?: once every other week  Are you able to tell when your blood sugar is high?: Yes  What are your symptoms?: blurry vision  Have you ever been hospitalized because your blood " sugar was high?: no    Medication Information  How do you obtain your medications?: Family picks up  How many days a week do you miss your medications?: Never  Do you use a pill box or medication chart to help you manage your medications?: Pill box  Do you sometimes have difficulty refilling your medications?: No  Medication adherence impacting ability to self-manage diabetes?: No    Labs  Do you have regular lab work to monitor your medications?: Yes  Type of Regular Lab Work: A1c, Cholesterol, Microalbumin, CBC, BMP  Where do you get your labs drawn?: [facility]  Lab Compliance Barriers: No    Nutritional Status  Diet: Regular  Meal Plan 24 Hour Recall: Breakfast, Lunch, Dinner, Snack  Meal Plan 24 Hour Recall - Breakfast: 1 slice of raisin bread (dry), coffee with plain creamer and sugar sub, cup of milk  Meal Plan 24 Hour Recall - Lunch: 1/2 cinnamon roll, cup of milk  Meal Plan 24 Hour Recall - Dinner: chicken, jambalaya, slice of raisin bread, unsweet tea  Meal Plan 24 Hour Recall - Snack: chocolate covered mouna crackers  Change in appetite?: No  Dentation:: Wears Dentures  Recent Changes in Weight: No Recent Weight Change  Current nutritional status an area of need that is impacting patient's ability to self-manage diabetes?: No    Additional Social History    Support  Does anyone support you with your diabetes care?: yes  Who supports you?: friend, self  Who takes you to your medical appointments?: self, friend  Does the current support meet the patient's needs?: Yes  Is Support an area impacting ability to self-manage diabetes?: No    Access to Mass Media & Technology  Does the patient have access to any of the following devices or technologies?: Smart phone, Internet Access  Media or technology needs impacting ability to self-manage diabetes?: No    Cognitive/Behavioral Health  Alert and Oriented: Yes  Difficulty Thinking: No  Requires Prompting: No  Requires assistance for routine expression?:  No  Cognitive or behavioral barriers impacting ability to self-manage diabetes?: No    Culture/Hinduism  Culture or Pentecostal beliefs that may impact ability to access healthcare: Yes    Communication  Language preference: English  Hearing Problems: No  Vision Problems: Yes  Vision problem type:: Decreased Vision  Vision Assistance: Glasses  Communication needs impacting ability to self-manage diabetes?: No    Health Literacy  Preferred Learning Method: Face to Face  How often do you need to have someone help you read instructions, pamphlets, or written material from your doctor or pharmacy?: Rarely  Health literacy needs impacting ability to self-manage diabetes?: No      Diabetes Self-Management Skills Assessment    Diabetes Disease Process/Treatment Options  Patient/caregiver able to state what happens when someone has diabetes.: yes  Patient/caregiver knows what type of diabetes they have.: yes  Diabetes Type : Type II  Patient/caregiver able to identify at least three signs and symptoms of diabetes.: yes  Identified signs and symptoms:: blurred vision  Patient able to identify at least three risk factors for diabetes.: yes  Identified risk factors:: ethnicity, family history  Diabetes Disease Process/Treatment Options: Skills Assessment Completed: Yes  Assessment indicates:: Adequate understanding  Area of need?: No    Nutrition/Healthy Eating  Challenges to healthy eating:: other (see comments) (does eat some sweets somewhat often)  Method of carbohydrate measurement:: no method  Patient can identify foods that impact blood sugar.: yes  Patient-identified foods:: fruit/fruit juice, sweets, soda, starches (bread, pasta, rice, cereal), starchy vegetables (corn, peas, beans), milk  Nutrition/Healthy Eating Skills Assessment Completed:: Yes  Assessment indicates:: Adequate understanding  Area of need?: Yes    Physical Activity/Exercise  Patient's daily activity level:: moderately active  Patient formally exercises  outside of work.: yes  Exercise Type: dancing, walking, other (see comments) (runs up and down stairs)  Intensity: Moderate  Frequency: four or more times a week  Duration: 30 min  Patient can identify forms of physical activity.: yes  Stated forms of physical activity:: chasing after children, moving to burn calories, housework  Patient can identify reasons why exercise/physical activity is important in diabetes management.: yes  Identified reasons:: helps insulin work better, lowers blood glucose, blood pressure, and cholesterol  Physical Activity/Exercise Skills Assessment Completed: : Yes  Assessment indicates:: Adequate understanding  Area of need?: No    Medications  Patient is able to describe current diabetes management routine.: yes  Diabetes management routine:: insulin, oral medications  Patient is able to identify current diabetes medications, dosages, and appropriate timing of medications.: yes  Patient understands the purpose of the medications taken for diabetes.: yes  Patient reports problems or concerns with current medication regimen.: yes  Medication regimen problems/concerns:: concerned about side effects (concerned with getting lows at times since starting insulin)  Medication Skills Assessment Completed:: Yes  Assessment indicates:: Adequate understanding  Area of need?: No    Home Blood Glucose Monitoring  Patient states that blood sugar is checked at home daily.: no  Reasons for not monitoring:: meter broken  Home Blood Glucose Monitoring Skills Assessment Completed: : Yes  Assessment indicates:: Adequate understanding  Area of need?: Yes    Acute Complications  Patient is able to identify types of acute complications: Yes  Patient Identified:: Hypoglycemia, Hyperglycemia  Patient is able to state the basic meaning of hypoglycemia?: Yes  Able to state the blood sugar range for hypoglycemia?: yes  Patient stated range:: 95 is low for her  Patient can identify general symptoms of hypoglycemia:  yes  Patient identified:: shakiness, other (see comments) (confusion)  Able to state proper treatment of hypoglycemia?: yes  Patient identified:: 1/2 can soda/fruit juice  Patient is able to state the basic meaning of hyperglycemia?: Yes  Able to state the blood sugar range for hyperglycemia?: yes  Patient stated range:: over 240  Patient able to state proper treatment of hyperglycemia?: yes  Patient identified:: take medication as recommended, increase water intake  Acute Complications Skills Assessment Completed: : Yes  Assessment indicates:: Adequate understanding  Area of need?: Yes    Chronic Complications  Patient can identify major chronic complications of diabetes.: yes  Stated chronic complications:: heart disease/heart attack, retinopathy, kidney disease  Patient can identify ways to prevent or delay diabetes complications.: yes  Stated ways to prevent complications:: controlling blood sugar, healthy eating and regular activity  Patient is aware that having diabetes increases risk of heart disease?: Yes  Patient is aware that heart disease is the leading cause of death and disability in people with diabetes?: Yes  Patient able to state risk factors for heart disease?: Yes  Patient stated risk factors for heart disease:: Having diabetes  Patient is taking statin?: Yes  Chronic Complications Skills Assessment Completed: : Yes  Assessment indicates:: Adequate understanding  Area of need?: No    Psychosocial/Coping  Patient can identify ways of coping with chronic disease.: yes  Patient-stated ways of coping with chronic disease:: spiritual/Pentecostalism practices, support from loved ones  Psychosocial/Coping Skills Assessment Completed: : Yes  Assessment indicates:: Adequate understanding  Area of need?: No      Assessment Summary and Plan    Based on today's diabetes care assessment, the following areas of need were identified:          4/2/2024    12:01 AM   Social   Support No   Access to Mass Media/Tech No    Cognitive/Behavioral Health No   Culture/Shinto Yes   Communication No   Health Literacy No            4/2/2024    12:01 AM   Clinical   Medication Adherence No   Lab Compliance No   Nutritional Status No            4/2/2024    12:01 AM   Diabetes Self-Management Skills   Diabetes Disease Process/Treatment Options No   Nutrition/Healthy Eating Yes- Care plan added   Physical Activity/Exercise No   Medication No   Home Blood Glucose Monitoring Yes- Care plan added   Acute Complications Yes- Reviewed blood glucose goals, prevention, detection, signs and symptoms, and treatment of hypoglycemia and hyperglycemia, and when to contact the clinic.   Chronic Complications No   Psychosocial/Coping No          Today's interventions were provided through individual discussion, instruction, and written materials were provided.      Patient verbalized understanding of instruction and written materials.  Pt was able to return back demonstration of instructions today. Patient understood key points, needs reinforcement and further instruction.     Diabetes Self-Management Care Plan:    Today's Diabetes Self-Management Care Plan was developed with Argelia's input. Argelia has agreed to work toward the following goal(s) to improve his/her overall diabetes control.      Care Plan: Diabetes Management   Updates made since 3/3/2024 12:00 AM        Problem: Healthy Eating         Goal: Eat 3 meals daily 2-3 servings of carb per meal. Add protein/healthy fat source to carb snacks. Reduce overall sugar intake.    Start Date: 4/2/2024   Expected End Date: 4/16/2024   Priority: High   Barriers: No Barriers Identified   Note:    4/2/2024- Patient is agreeable to eating mouna crackers rather than chocolate for her evening snack. Discussed importance of protein when eating carbs for better blood sugar stablization.        Task: Reviewed the sources and role of Carbohydrate, Protein, and Fat and how each nutrient impacts blood sugar.  Completed 4/2/2024          Task: Recommended replacing beverages containing high sugar content with noncaloric/sugar free options and/or water. Completed 4/2/2024        Task: Review the importance of balancing carbohydrates with each meal using portion control techniques to count servings of carbohydrate and label reading to identify serving size and amount of total carbs per serving. Completed 4/2/2024        Task: Provided Sample plate method and reviewed the use of the plate to estimate amounts of carbohydrate per meal. Completed 4/2/2024        Problem: Blood Glucose Self-Monitoring         Goal: Patient agrees to check using a continous glucose monitor.    Start Date: 4/2/2024   Expected End Date: 4/16/2024   Priority: Medium   Note:    4/20/2024- Patient inquired about getting a continuous glucose monitor now that she is on insulin and I feel patient would benefit from one. Will work on paperwork for this to seem to DME company. I also provided patient with a new meter since hers broke along with a coupon card for more testing strips.        Task: Provided patient with a meter today. Completed 4/2/2024            Follow Up Plan     Follow up in 2 weeks (on 4/16/2024) for CGM training and continued DSME.     Today's care plan and follow up schedule was discussed with patient.  Argelia verbalized understanding of the care plan, goals, and agrees to follow up plan.        The patient was encouraged to communicate with his/her health care provider/physician and care team regarding his/her condition(s) and treatment.  I provided the patient with my contact information today and encouraged to contact me via phone or [facility] Patient Portal as needed.     Length of Visit   Total Time: 60 Minutes

## 2024-04-17 ENCOUNTER — PATIENT MESSAGE (OUTPATIENT)
Dept: ADMINISTRATIVE | Facility: HOSPITAL | Age: 74
End: 2024-04-17
Payer: MEDICARE

## 2024-04-20 ENCOUNTER — TELEPHONE (OUTPATIENT)
Dept: FAMILY MEDICINE | Facility: CLINIC | Age: 74
End: 2024-04-20
Payer: MEDICARE

## 2024-04-20 NOTE — TELEPHONE ENCOUNTER
Pt needs fasting labs in mid June then see NP same week (bring all meds) Please notify her via phone. Thanks, Blanca

## 2024-04-22 ENCOUNTER — TELEPHONE (OUTPATIENT)
Dept: FAMILY MEDICINE | Facility: CLINIC | Age: 74
End: 2024-04-22
Payer: MEDICARE

## 2024-04-22 NOTE — TELEPHONE ENCOUNTER
Please call pt with dietician appt reminder for 4/25 and I recommend someone come with her. Ask her to write dose appt, set alarm whatever is needed and to bring smart phone with her. Thanks, Blanca

## 2024-04-25 ENCOUNTER — CLINICAL SUPPORT (OUTPATIENT)
Dept: DIABETES | Facility: CLINIC | Age: 74
End: 2024-04-25
Payer: MEDICARE

## 2024-04-25 VITALS — BODY MASS INDEX: 33.77 KG/M2 | WEIGHT: 183.5 LBS | HEIGHT: 62 IN

## 2024-04-25 DIAGNOSIS — E78.5 TYPE 2 DIABETES MELLITUS WITH HYPERLIPIDEMIA: Primary | ICD-10-CM

## 2024-04-25 DIAGNOSIS — E11.69 TYPE 2 DIABETES MELLITUS WITH HYPERLIPIDEMIA: Primary | ICD-10-CM

## 2024-04-25 PROCEDURE — G0108 DIAB MANAGE TRN  PER INDIV: HCPCS | Mod: S$GLB,,, | Performed by: DIETITIAN, REGISTERED

## 2024-04-25 NOTE — TELEPHONE ENCOUNTER
Spoke with pt via phone. Scheduled June appt with pcp as well as labs. Pt asked for sooner visit to discuss anxiety. Scheduled per request. All understanding.

## 2024-04-25 NOTE — PROGRESS NOTES
"Diabetes Care Specialist Follow-up Note  Author: Arlen Pedraza RD  Date: 4/25/2024    Program Intake  Reason for Diabetes Program Visit:: Intervention  Type of Intervention:: Individual  Individual: Education  Education: Self-Management Skill Review, Nutrition and Meal Planning, Pattern Management    Lab Results   Component Value Date    HGBA1C 9.4 (H) 03/13/2024       Clinical    Weight: 83.2 kg (183 lb 8 oz)   Height: 5' 2" (157.5 cm)   Body mass index is 33.56 kg/m².  No significant weight change since last visit on 4/2/2024           Clinical Assessment  Current Diabetes Treatment: Oral Medication, Insulin (Jardiance 25 mg daily, glipizide 10 mg twice daily before meals, Metformin 1,000 mg twice daily, patient just started on Lantus 20 units HS but holding off on this for now until we get her CGM supplies in)         Nutritional Status  Meal Plan 24 Hour Recall: Breakfast, Lunch, Dinner, Snack  Meal Plan 24 Hour Recall - Breakfast: 1 slice of raisin bread (dry), coffee with plain creamer and sugar sub, cup of milk  Meal Plan 24 Hour Recall - Lunch: some taco meat and veggies, few fries (patient ate in the cafeteria before appt today)  Meal Plan 24 Hour Recall - Dinner: 5.5 mini corn dogs w/ketchup, mustard and bacon  Meal Plan 24 Hour Recall - Snack: creamy instant oatmeal with a little butter and milk    Physical activity/Exercise:   Active lifestyle walking often and playing/dancing with grandchildren, walks up and down stairs as well.     SMBG: Accu-Chek Guide Me  Post prandial, 147           Today's interventions were provided through individual discussion, instruction, and written materials were provided.    Patient verbalized understanding of instruction and written materials.  Pt was able to return back demonstration of instructions today. Patient understood key points, needs reinforcement and further instruction.     Diabetes Self-Management Care Plan Review and Evaluation of Progress:    During today's " follow-up Argelia's Diabetes Self-Management Care Plan progress was reviewed and progress was evaluated including his/her input. Argelia has agreed to continue his/her journey to improve/maintain overall diabetes control by continuing to set health goals. See care plan progress below.      Care Plan: Diabetes Management   Updates made since 3/26/2024 12:00 AM        Problem: Healthy Eating         Goal: Eat 3 meals daily 2-3 servings of carb per meal. Add protein/healthy fat source to carb snacks. Reduce overall sugar intake.    Start Date: 4/2/2024   Expected End Date: 5/9/2024   This Visit's Progress: On track   Priority: High   Barriers: No Barriers Identified   Note:    4/25/2024- Patient reports not buying the chocolate covered grahams anymore and just getting the plain one and eating a few for a snack. States she has eaten eggs on a few occasions since we last met to help get more protein in her diet. Encouraged regular meals and snacks and not to go too long between them due to risk of low blood sugar on Glipizide and long acting insulin in the future.            Problem: Blood Glucose Self-Monitoring         Goal: Patient agrees to check blood sugar using a continous glucose monitor.    Start Date: 4/2/2024   Expected End Date: 5/9/2024   This Visit's Progress: No change   Priority: Medium   Barriers: No Barriers Identified   Note:    4/20/2024- Patient has not yet received her CGM supplies yet. There was some lingering paperwork that I helped her with today by calling the DME company and getting the form emailed to me. Has been testing some in the meantime, reported 147 after breakfast this morning. Recalls a blood sugar of 57 when she awoke in the middle of the night, also a 107 in which she felt shaky as well. I reviewed how to properly treat a low blood sugar and provided patient with a handout to help her remember. Encouraged patient to get some glucose tablets, gel or gummies as they are shelf stable  and easy to store/transport.        Follow Up Plan     Follow up in 2 weeks (on 5/9/2024) for CMG start.    Today's care plan and follow up schedule was discussed with patient.  Argelia verbalized understanding of the care plan, goals, and agrees to follow up plan.        The patient was encouraged to communicate with his/her health care provider/physician and care team regarding his/her condition(s) and treatment.  I provided the patient with my contact information today and encouraged to contact me via phone or [facility] Patient Portal as needed.     Length of Visit   Total Time: 60 Minutes

## 2024-04-30 ENCOUNTER — OFFICE VISIT (OUTPATIENT)
Dept: OTOLARYNGOLOGY | Facility: CLINIC | Age: 74
End: 2024-04-30
Payer: MEDICARE

## 2024-04-30 VITALS — WEIGHT: 181.69 LBS | BODY MASS INDEX: 33.43 KG/M2 | HEIGHT: 62 IN

## 2024-04-30 DIAGNOSIS — J06.9 UPPER RESPIRATORY TRACT INFECTION, UNSPECIFIED TYPE: Primary | ICD-10-CM

## 2024-04-30 PROCEDURE — 3046F HEMOGLOBIN A1C LEVEL >9.0%: CPT | Mod: CPTII,S$GLB,, | Performed by: OTOLARYNGOLOGY

## 2024-04-30 PROCEDURE — 3288F FALL RISK ASSESSMENT DOCD: CPT | Mod: CPTII,S$GLB,, | Performed by: OTOLARYNGOLOGY

## 2024-04-30 PROCEDURE — 1125F AMNT PAIN NOTED PAIN PRSNT: CPT | Mod: CPTII,S$GLB,, | Performed by: OTOLARYNGOLOGY

## 2024-04-30 PROCEDURE — 99213 OFFICE O/P EST LOW 20 MIN: CPT | Mod: S$GLB,,, | Performed by: OTOLARYNGOLOGY

## 2024-04-30 PROCEDURE — 1159F MED LIST DOCD IN RCRD: CPT | Mod: CPTII,S$GLB,, | Performed by: OTOLARYNGOLOGY

## 2024-04-30 PROCEDURE — 1157F ADVNC CARE PLAN IN RCRD: CPT | Mod: CPTII,S$GLB,, | Performed by: OTOLARYNGOLOGY

## 2024-04-30 PROCEDURE — 3008F BODY MASS INDEX DOCD: CPT | Mod: CPTII,S$GLB,, | Performed by: OTOLARYNGOLOGY

## 2024-04-30 PROCEDURE — 1160F RVW MEDS BY RX/DR IN RCRD: CPT | Mod: CPTII,S$GLB,, | Performed by: OTOLARYNGOLOGY

## 2024-04-30 PROCEDURE — 99999 PR PBB SHADOW E&M-EST. PATIENT-LVL III: CPT | Mod: PBBFAC,,, | Performed by: OTOLARYNGOLOGY

## 2024-04-30 PROCEDURE — 1101F PT FALLS ASSESS-DOCD LE1/YR: CPT | Mod: CPTII,S$GLB,, | Performed by: OTOLARYNGOLOGY

## 2024-04-30 RX ORDER — DEXAMETHASONE 2 MG/1
TABLET ORAL
Qty: 5 TABLET | Refills: 2 | Status: SHIPPED | OUTPATIENT
Start: 2024-04-30

## 2024-04-30 RX ORDER — SULFAMETHOXAZOLE AND TRIMETHOPRIM 800; 160 MG/1; MG/1
1 TABLET ORAL 2 TIMES DAILY
Qty: 14 TABLET | Refills: 0 | Status: SHIPPED | OUTPATIENT
Start: 2024-04-30 | End: 2024-05-07

## 2024-04-30 NOTE — PROGRESS NOTES
Subjective:       Patient ID: Argelia Pyel is a 73 y.o. female.    Chief Complaint: Sinus Problem (Pt c/o facial pain/pressure, ear pain, right nostril drainage, and dizziness )      This 73-year-old patient developed what she thinks has a sinus infection just over the weekend she has a round some grandkids for example and they been coughing a lot and that has just been hard for her to even sleep because of her nasal congestion and paranasal sinus pain in particular.          Objective:      ENT Physical Exam    So her nose is actually quite open she has got a bunch of room I do not see any pus she has good anatomy     Her tympanic membranes and ear canals look normal     Her oropharynx is normal             Assessment:       1. Upper respiratory tract infection, unspecified type         Plan:          Paranasal sinus pain as her primary complaint and her exam is not perfectly consistent with a bacterial infection but I am going to treat that because of her degree of symptoms and she is going to let me know if she does not get better pretty quickly

## 2024-05-14 ENCOUNTER — TELEPHONE (OUTPATIENT)
Dept: FAMILY MEDICINE | Facility: CLINIC | Age: 74
End: 2024-05-14
Payer: MEDICARE

## 2024-05-15 NOTE — TELEPHONE ENCOUNTER
Per  pt has not received any nerve pills. Prescribed mirtazapine (remeron) 15 mg thus enc to take and follow up in person if needed. Thanks, Blanca

## 2024-05-15 NOTE — TELEPHONE ENCOUNTER
----- Message from Haroon Resendiz MA sent at 5/13/2024  2:05 PM CDT -----  Regarding: FW: advice  Contact: patient  Please Advise      Thank you,  Haroon Resendiz MA  ----- Message -----  From: Coretta Resendiz  Sent: 5/13/2024   1:06 PM CDT  To: [facility] Staff  Subject: advice                                           Type: Needs Medical Advice  Who Called:  patient  Symptoms (please be specific):    How long has patient had these symptoms:    Pharmacy name and phone #:    [id number] - [place], [place] - [address]  [address]  [place] [address]  Phone: [phone] Fax: [phone]    Best Call Back Number: [phone] (home)     Additional Information: Melatonin is not helping patient states she needs to be put back on her nerve pills.  Please call patient to advise.  Thanks!

## 2024-05-20 NOTE — TELEPHONE ENCOUNTER
Spoke with pt. Pt feels new RX is working well as she has been able to get rest.     Encouraged pt to contact office if she has further questions or concerns.   Pt voiced understanding.

## 2024-05-20 NOTE — TELEPHONE ENCOUNTER
Msg sent to staff 5/14/24. Note appears not addressed as it is open w/o documentation. Please call pt. Thanks, Blanca

## 2024-05-22 RX ORDER — METFORMIN HYDROCHLORIDE 1000 MG/1
1000 TABLET ORAL 2 TIMES DAILY
Qty: 60 TABLET | Refills: 0 | OUTPATIENT
Start: 2024-05-22

## 2024-05-22 NOTE — TELEPHONE ENCOUNTER
----- Message from Esme Kamara sent at 5/22/2024 10:41 AM CDT -----  Regarding: Referral Request  Contact: pt  Type:  Patient Requesting Referral    Who Called:pt    Does the patient already have the specialty appointment scheduled?:no    If yes, what is the date of that appointment?:no    Referral to What Specialty:ortho    Reason for Referral: hand and knee pain    Does the patient want the referral with a specific physician?:no    [facility]    Patient Requesting a Response?:yes    Would the patient rather a call back or a response via [facility]? Call back    Best Call Back Number:[phone]    Additional Information: referral for knee and hand.  Please advise.  Thank you.

## 2024-05-22 NOTE — TELEPHONE ENCOUNTER
----- Message from Esme Kamara sent at 5/22/2024 10:37 AM CDT -----  Regarding: Refill request  Contact: pt  Type:  RX Refill Request    Who Called: pt    Refill or New Rx: refill    RX Name and Strength:metFORMIN (GLUCOPHAGE) 1000 MG tablet    How is the patient currently taking it? (ex. 1XDay):2/day    Is this a 30 day or 90 day RX:30    Preferred Pharmacy with phone number:  [id number] - [place], [place] - [address]  [address] [place] [address]  Phone: [phone] Fax: [phone]    Local or Mail Order:local  Ordering Provider:Ronald    Would the patient rather a call back or a response via [facility]? Call back    Best Call Back Number:[phone]    Additional Information: refill request.  Please advise.  Thank you.

## 2024-05-23 NOTE — TELEPHONE ENCOUNTER
"Blanca, pt requesting refill on Metformin   Pt states, " I have lantus, but I am not taking it. I have been taking Metformin for years"   Metformin pended   "

## 2024-05-23 NOTE — TELEPHONE ENCOUNTER
----- Message from Coretta Astrid sent at 5/23/2024  9:22 AM CDT -----  Regarding: refill  Contact: patient  Type:  RX Refill Request    Who Called:  patient  Refill or New Rx:  refill  RX Name and Strength:  metFORMIN (GLUCOPHAGE) 1000 MG tablet  How is the patient currently taking it? (ex. 1XDay):  as directed  Is this a 30 day or 90 day RX:  90  Preferred Pharmacy with phone number:   [id number] - [place], [place] - [address]  [address] [place] [address]  Phone: [phone] Fax: [phone]      Local or Mail Order:  local  Ordering Provider:  Dr. Ronald Diaz Call Back Number:  [phone]    Additional Information:  Patient is out of medication.  Please call patient to advise.  Thanks!

## 2024-05-25 RX ORDER — METFORMIN HYDROCHLORIDE 1000 MG/1
1000 TABLET ORAL 2 TIMES DAILY
Qty: 60 TABLET | Refills: 11 | Status: SHIPPED | OUTPATIENT
Start: 2024-05-25

## 2024-05-30 ENCOUNTER — OFFICE VISIT (OUTPATIENT)
Dept: ORTHOPEDICS | Facility: CLINIC | Age: 74
End: 2024-05-30
Payer: MEDICARE

## 2024-05-30 VITALS
HEIGHT: 62 IN | RESPIRATION RATE: 18 BRPM | BODY MASS INDEX: 33.68 KG/M2 | HEART RATE: 76 BPM | OXYGEN SATURATION: 100 % | WEIGHT: 183 LBS

## 2024-05-30 DIAGNOSIS — M79.641 RIGHT HAND PAIN: Primary | ICD-10-CM

## 2024-05-30 PROCEDURE — 1160F RVW MEDS BY RX/DR IN RCRD: CPT | Mod: CPTII,S$GLB,, | Performed by: ORTHOPAEDIC SURGERY

## 2024-05-30 PROCEDURE — 3008F BODY MASS INDEX DOCD: CPT | Mod: CPTII,S$GLB,, | Performed by: ORTHOPAEDIC SURGERY

## 2024-05-30 PROCEDURE — 99999 PR PBB SHADOW E&M-EST. PATIENT-LVL III: CPT | Mod: PBBFAC,,, | Performed by: ORTHOPAEDIC SURGERY

## 2024-05-30 PROCEDURE — 99214 OFFICE O/P EST MOD 30 MIN: CPT | Mod: S$GLB,,, | Performed by: ORTHOPAEDIC SURGERY

## 2024-05-30 PROCEDURE — 1101F PT FALLS ASSESS-DOCD LE1/YR: CPT | Mod: CPTII,S$GLB,, | Performed by: ORTHOPAEDIC SURGERY

## 2024-05-30 PROCEDURE — 1125F AMNT PAIN NOTED PAIN PRSNT: CPT | Mod: CPTII,S$GLB,, | Performed by: ORTHOPAEDIC SURGERY

## 2024-05-30 PROCEDURE — 1159F MED LIST DOCD IN RCRD: CPT | Mod: CPTII,S$GLB,, | Performed by: ORTHOPAEDIC SURGERY

## 2024-05-30 PROCEDURE — 3288F FALL RISK ASSESSMENT DOCD: CPT | Mod: CPTII,S$GLB,, | Performed by: ORTHOPAEDIC SURGERY

## 2024-05-30 PROCEDURE — 1157F ADVNC CARE PLAN IN RCRD: CPT | Mod: CPTII,S$GLB,, | Performed by: ORTHOPAEDIC SURGERY

## 2024-05-30 PROCEDURE — 3046F HEMOGLOBIN A1C LEVEL >9.0%: CPT | Mod: CPTII,S$GLB,, | Performed by: ORTHOPAEDIC SURGERY

## 2024-05-30 NOTE — PROGRESS NOTES
Subjective:      Patient ID: Argelia Pyle is a 73 y.o. female.    Chief Complaint: Pain of the Right Hand    HPI   The patient is in for follow up with the ongoing right hand wrist pain ongoing numbness and tingling the finger that we injected his much improved  ROS      Objective:    Ortho Exam      Constitutional:   Patient is alert  and oriented in no acute distress  HEENT:  normocephalic atraumatic; PERRL EOMI  Neck:  Supple without adenopathy  Cardiovascular:  Normal rate and rhythm  Pulmonary:  Normal respiratory effort normal chest wall expansion  Abdominal:  Nonprotuberant nondistended  Musculoskeletal:  Patient has adequate cervical range of motion without tenderness  With a negative Lhermitte's and Spurling sign.  Upon gross inspection of the hands  There is no mass, swelling, or atrophy.  Strength is 5/5 including APB strength testing  Negative CMC grind.   Positive  radial styloid tenderness and mildly positive Finkelstein's  Positive carpal tunnel compression test  Negative Tinel's.  Equivocal Phalen's  Neurological:  No focal defect; cranial nerves 2-12 grossly intact  Psychiatric/behavioral:  Mood and behavior normal    Mammo Digital Screening Bilat w/ Lucius  Narrative: Result:  Mammo Digital Screening Bilat w/ Lucius    History:  Patient is 73 y.o. and is seen for a screening mammogram.    Films Compared:  Compared to: 05/31/2022 Mammo Digital Screening Bilat w/ Lucius and   04/27/2021 Mammo Digital Screening Bilat w/ Lucius     Findings:  This procedure was performed using tomosynthesis.   Computer-aided detection was utilized in the interpretation of this   examination.    The breasts are heterogeneously dense, which may obscure small masses.   There is no evidence of suspicious masses, microcalcifications or   architectural distortion. Biopsy marker noted on left. Numerous bilateral   benign calcifications are unchanged.  Impression:    No mammographic evidence of malignancy.    BI-RADS Category 2:  Benign.    Recommendation:  Routine screening mammogram in 1 year is recommended.    Your estimated lifetime risk of breast cancer (to age 85) based on   Tyrer-Cuzick risk assessment model is 4.31 %.  According to the American   Cancer Society, patients with a lifetime breast cancer risk of 20% or   higher might benefit from supplemental screening tests, such as screening   breast MRI.       My Radiographs Findings:     No new radiographs  Assessment:       Encounter Diagnosis   Name Primary?    Right hand pain Yes         Plan:        I have discussed medical condition and treatment options with her at length.  We have given her some rehab exercises for radial styloid tendinitis/de Quervain I will get nerve studies as she likely has a component of carpal tunnel syndrome.  Follow up afterwards for more definitive disposition sooner if any questions or problems.        Past Medical History:   Diagnosis Date    Anxiety     Arthritis     Chronic sinusitis     Diabetes mellitus     Hypertension      Past Surgical History:   Procedure Laterality Date    BREAST BIOPSY      BREAST LUMPECTOMY Left     BUNIONECTOMY Bilateral     COLONOSCOPY      ESOPHAGOGASTRODUODENOSCOPY      ETHMOIDECTOMY Bilateral 02/07/2019    Procedure: ETHMOIDECTOMY;  Surgeon: Michael Grubbs MD;  Location: [facility] OR;  Service: ENT;  Laterality: Bilateral;  endoscopic anterior and posterior    FUNCTIONAL ENDOSCOPIC SINUS SURGERY (FESS) N/A 02/07/2019    Procedure: FESS (FUNCTIONAL ENDOSCOPIC SINUS SURGERY);  Surgeon: Michael Grubbs MD;  Location: [facility] OR;  Service: ENT;  Laterality: N/A;    FUNCTIONAL ENDOSCOPIC SINUS SURGERY (FESS) Bilateral 12/19/2019    Procedure: FESS (FUNCTIONAL ENDOSCOPIC SINUS SURGERY);  Surgeon: Michael Grubbs MD;  Location: [facility] OR;  Service: ENT;  Laterality: Bilateral;    MAXILLARY ANTROSTOMY Bilateral 02/07/2019    Procedure: MAXILLARY ANTROSTOMY;  Surgeon: Michael Grubbs MD;  Location: [facility] OR;  Service: ENT;   Laterality: Bilateral;    NASAL SEPTOPLASTY Bilateral 02/07/2019    Procedure: SEPTOPLASTY, NOSE;  Surgeon: Michael Grubbs MD;  Location: [facility] OR;  Service: ENT;  Laterality: Bilateral;    NASAL TURBINATE REDUCTION Bilateral 02/07/2019    Procedure: REDUCTION, NASAL TURBINATE;  Surgeon: Michael Grubbs MD;  Location: [facility] OR;  Service: ENT;  Laterality: Bilateral;    SINUS SURGERY      x4    TUBAL LIGATION      [facility]         Current Outpatient Medications:     albuterol (PROVENTIL/VENTOLIN HFA) 90 mcg/actuation inhaler, Inhale 2 puffs into the lungs every 4 (four) hours as needed for Wheezing or Shortness of Breath., Disp: 18 g, Rfl: 11    amLODIPine (NORVASC) 10 MG tablet, Take 1 tablet (10 mg total) by mouth every evening., Disp: 90 tablet, Rfl: 3    aspirin (ECOTRIN) 81 MG EC tablet, Take 1 tablet (81 mg total) by mouth every evening., Disp: 90 tablet, Rfl: 3    azelastine (ASTELIN) 137 mcg (0.1 %) nasal spray, 1 spray (137 mcg total) by Nasal route 2 (two) times daily as needed for Rhinitis., Disp: 30 mL, Rfl: 11    blood sugar diagnostic Strp, To check BG 1 time daily, to use with insurance preferred meter, Disp: 200 strip, Rfl: 11    blood-glucose meter kit, To check BG 1 time daily, to use with insurance preferred meter, Disp: 1 each, Rfl: 0    busPIRone (BUSPAR) 15 MG tablet, Take 1 tablet (15 mg total) by mouth 2 (two) times daily., Disp: 180 tablet, Rfl: 1    cetirizine (ZYRTEC) 10 MG tablet, Take 1 tablet (10 mg total) by mouth daily as needed for Allergies., Disp: 90 tablet, Rfl: 3    cyclobenzaprine (FLEXERIL) 5 MG tablet, Take 1 tablet (5 mg total) by mouth 3 (three) times daily as needed for Muscle spasms., Disp: 30 tablet, Rfl: 5    dexAMETHasone (DECADRON) 2 MG tablet, Take one every morning for five days, Disp: 5 tablet, Rfl: 2    diclofenac sodium (VOLTAREN) 1 % Gel, APPLY TOPICALLY TO THE AFFECTED AREA FOUR TIMES DAILY, Disp: 150 g, Rfl: 5     "empagliflozin (JARDIANCE) 25 mg tablet, Take 1 tablet (25 mg total) by mouth once daily., Disp: 30 tablet, Rfl: 5    fluticasone propionate (FLONASE) 50 mcg/actuation nasal spray, 2 sprays (100 mcg total) by Each Nostril route daily as needed for Rhinitis., Disp: 15.8 mL, Rfl: 11    glipiZIDE (GLUCOTROL) 10 MG tablet, Take 1 tablet (10 mg total) by mouth 2 (two) times daily before meals., Disp: 180 tablet, Rfl: 3    ibuprofen (ADVIL,MOTRIN) 800 MG tablet, Take 1 tablet (800 mg total) by mouth 2 (two) times daily as needed for Pain., Disp: 60 tablet, Rfl: 5    insulin (LANTUS SOLOSTAR U-100 INSULIN) glargine 100 units/mL SubQ pen, Inject 20 Units into the skin every evening., Disp: 6 mL, Rfl: 5    lancets Misc, To check BG 1 time daily, to use with insurance preferred meter, Disp: 200 each, Rfl: 0    losartan-hydrochlorothiazide 100-25 mg (HYZAAR) 100-25 mg per tablet, Take 1 tablet by mouth every morning., Disp: 90 tablet, Rfl: 3    metFORMIN (GLUCOPHAGE) 1000 MG tablet, Take 1 tablet (1,000 mg total) by mouth 2 (two) times daily., Disp: 60 tablet, Rfl: 11    metoprolol tartrate (LOPRESSOR) 50 MG tablet, Take 1 tablet (50 mg total) by mouth 2 (two) times daily., Disp: 180 tablet, Rfl: 3    mirtazapine (REMERON) 15 MG tablet, Take 1 tablet (15 mg total) by mouth every evening., Disp: 30 tablet, Rfl: 11    paroxetine (PAXIL) 20 MG tablet, Take 1 tablet (20 mg total) by mouth every evening., Disp: 90 tablet, Rfl: 3    pen needle, diabetic (BD GIUSEPPE 2ND GEN PEN NEEDLE) 32 gauge x 5/32" Ndle, 1 Device by Misc.(Non-Drug; Combo Route) route nightly., Disp: 100 each, Rfl: 11    pregabalin (LYRICA) 75 MG capsule, Take 1 capsule (75 mg total) by mouth 2 (two) times daily., Disp: 180 capsule, Rfl: 1    simvastatin (ZOCOR) 20 MG tablet, Take 1 tablet (20 mg total) by mouth nightly., Disp: 90 tablet, Rfl: 3    Review of patient's allergies indicates:   Allergen Reactions    Cefuroxime Rash    Ketorolac tromethamine Other (See " Comments)     Other reaction(s): Burning    Moxifloxacin Other (See Comments)     Heart racing.    Codeine Itching    Famotidine-ca carb-mag hydrox Hives    Ketorolac Other (See Comments)    Prevacid [lansoprazole] Itching       Family History   Problem Relation Name Age of Onset    Leukemia Son      Breast cancer Neg Hx       Social History     Occupational History    Not on file   Tobacco Use    Smoking status: Former     Current packs/day: 0.00     Average packs/day: 0.8 packs/day for 25.0 years (18.8 ttl pk-yrs)     Types: Cigarettes     Start date: 1974     Quit date: 1999     Years since quittin.1    Smokeless tobacco: Never   Substance and Sexual Activity    Alcohol use: Yes     Comment: social    Drug use: Yes     Types: Other-see comments     Comment: tramadol as prescribed    Sexual activity: Not Currently     Birth control/protection: See Surgical Hx

## 2024-06-06 ENCOUNTER — NUTRITION (OUTPATIENT)
Dept: DIABETES | Facility: CLINIC | Age: 74
End: 2024-06-06
Payer: MEDICARE

## 2024-06-06 VITALS — WEIGHT: 185.81 LBS | BODY MASS INDEX: 34.19 KG/M2 | HEIGHT: 62 IN

## 2024-06-06 DIAGNOSIS — E78.5 TYPE 2 DIABETES MELLITUS WITH HYPERLIPIDEMIA: Primary | ICD-10-CM

## 2024-06-06 DIAGNOSIS — E11.69 TYPE 2 DIABETES MELLITUS WITH HYPERLIPIDEMIA: Primary | ICD-10-CM

## 2024-06-06 PROCEDURE — G0108 DIAB MANAGE TRN  PER INDIV: HCPCS | Mod: S$GLB,,, | Performed by: DIETITIAN, REGISTERED

## 2024-06-06 NOTE — PROGRESS NOTES
"Diabetes Care Specialist Follow-up Note  Author: Arlen Pedraza RD  Date: 6/6/2024    Program Intake  Reason for Diabetes Program Visit:: Intervention  Type of Intervention:: Individual  Individual: Device Training  Device Training: Personal CGM    Lab Results   Component Value Date    HGBA1C 9.4 (H) 03/13/2024         Clinical    Weight: 84.3 kg (185 lb 12.8 oz)   Height: 5' 2" (157.5 cm)   Body mass index is 33.98 kg/m².  Wt gain of almost 2.5 lbs since last visit on 4/25/2024              Clinical Assessment  Current Diabetes Treatment: Oral Medication, Insulin (Jardiance 25 mg daily, glipizide 10 mg twice daily before meals, Metformin 1,000 mg twice daily, Lantus 20 units HS has been holding off on this for now until we get her CGM supplies in, will start tonight)              Nutritional Status  Diet: Regular  Meal Plan 24 Hour Recall: Breakfast, Dinner, Snack  Meal Plan 24 Hour Recall - Breakfast: a little chocolate cake (no icing), coffee w/sugar sub and sugar free creamer, water  Meal Plan 24 Hour Recall - Dinner: 2 sloppy joes  Meal Plan 24 Hour Recall - Snack: a little chocolate cake (no icing)  Change in appetite?: Yes (increased here lately)    Physical activity/Exercise:   Active lifestyle walking often and playing/dancing with grandchildren, walks up and down stairs as well.     SMBG:   Has not been testing, patient ran out of testing strips. Pended order for more testing strips that can be used in new Freestyle Nicolasa 3 reader (Freestyle Precision Quoc Blood Glucose Test Strips)         Today's interventions were provided through individual discussion, instruction, and written materials were provided.    Patient verbalized understanding of instruction and written materials.  Pt was able to return back demonstration of instructions today. Patient understood key points, needs reinforcement and further instruction.     Diabetes Self-Management Care Plan Review and Evaluation of Progress:    During today's " follow-up Argelia's Diabetes Self-Management Care Plan progress was reviewed and progress was evaluated including his/her input. Argelia has agreed to continue his/her journey to improve/maintain overall diabetes control by continuing to set health goals. See care plan progress below.      Care Plan: Diabetes Management   Updates made since 5/7/2024 12:00 AM        Problem: Healthy Eating         Goal: Eat 3 meals daily 2-3 servings of carb per meal. Add protein/healthy fat source to carb snacks. Reduce overall sugar intake.    Start Date: 4/2/2024   Expected End Date: 7/9/2024   Recent Progress: On track   Priority: High   Barriers: No Barriers Identified   Note:    6/6/2024- Patient eating a little better with improved appetite and intake of meals, however encouraged patient to have at least a snack between breakfast and lunch to prevent any lows with starting insulin and so she is not overly hungry at dinner.       Problem: Blood Glucose Self-Monitoring         Goal: Patient agrees to check blood sugar using a continous glucose monitor.    Start Date: 4/2/2024   Expected End Date: 7/9/2024   Recent Progress: No change   Priority: Medium   Barriers: No Barriers Identified   Note:    6/6/2024-    DIABETES EDUCATOR NOTE  PLACEMENT OF FREESTYLE ABILIO 3 PERSONAL CONTINOUS GLUCOSE MONITORING SYSTEM (CGMS)     REFERRING PROVIDER: Sonya Blackman     Patient is here in clinic today for placement of personal continuous glucose monitoring system (CGMS).   Patient has Freestyle Abilio 3 sensor and reader. Patient will use reader to receive continuous glucose readings.  Pt verbalizes understanding to change sensor every 14 days. Patient is also aware that each time a new sensor is placed there is a 1 hour warm up period.  Scanning only needs to be done when new sensor is put on; otherwise no scanning necessary. Calibration is not necessary.     A detailed explanation of Freestyle Abilio 3 Continuous Glucose Monitoring  "System was provided. Reviewed the  "Getting Started Guide" with patient and patient has a written copy for home use.  Patient was allowed to practice and time allowed to ask questions. Pt will notify PCP if glucose levels are above 300 mg/dL consistently or if episode of glucose less than 70 mg/dL presents.  Pt verbalizes understanding.        High alert set at 250 mg/dL  Low alert set at 70 mg/dL   Signal loss: on     An appropriate site was selected and prepared. Patient inserted sensor into left upper arm. Sensor will be changed every 14 days. All questions answered.  Pt encouraged to contact myself with any further questions or concerns.     Supplies from: [employer] [facility] [place] [employer]    Note blood sugar after warm up period was 154 and stable.          Follow Up Plan     Follow up in 1 month (on 7/9/2024) for download Nicolasa 3 reader.    Today's care plan and follow up schedule was discussed with patient.  Luchoenda verbalized understanding of the care plan, goals, and agrees to follow up plan.        The patient was encouraged to communicate with his/her health care provider/physician and care team regarding his/her condition(s) and treatment.  I provided the patient with my contact information today and encouraged to contact me via phone or [facility] Patient Portal as needed.     Length of Visit   Total Time: 75 Minutes     "

## 2024-06-10 ENCOUNTER — LAB VISIT (OUTPATIENT)
Dept: LAB | Facility: HOSPITAL | Age: 74
End: 2024-06-10
Attending: NURSE PRACTITIONER
Payer: MEDICARE

## 2024-06-10 DIAGNOSIS — M79.2 NERVE PAIN: ICD-10-CM

## 2024-06-10 DIAGNOSIS — Z79.899 HIGH RISK MEDICATION USE: ICD-10-CM

## 2024-06-10 DIAGNOSIS — E55.9 VITAMIN D DEFICIENCY: ICD-10-CM

## 2024-06-10 DIAGNOSIS — E11.69 TYPE 2 DIABETES MELLITUS WITH HYPERLIPIDEMIA: ICD-10-CM

## 2024-06-10 DIAGNOSIS — E78.5 TYPE 2 DIABETES MELLITUS WITH HYPERLIPIDEMIA: ICD-10-CM

## 2024-06-10 LAB
25(OH)D3+25(OH)D2 SERPL-MCNC: 36 NG/ML (ref 30–96)
ALBUMIN SERPL BCP-MCNC: 3.8 G/DL (ref 3.5–5.2)
ALP SERPL-CCNC: 100 U/L (ref 55–135)
ALT SERPL W/O P-5'-P-CCNC: 11 U/L (ref 10–44)
ANION GAP SERPL CALC-SCNC: 13 MMOL/L (ref 8–16)
AST SERPL-CCNC: 18 U/L (ref 10–40)
BASOPHILS # BLD AUTO: 0.04 K/UL (ref 0–0.2)
BASOPHILS NFR BLD: 0.5 % (ref 0–1.9)
BILIRUB SERPL-MCNC: 0.3 MG/DL (ref 0.1–1)
BUN SERPL-MCNC: 12 MG/DL (ref 8–23)
CALCIUM SERPL-MCNC: 9.8 MG/DL (ref 8.7–10.5)
CHLORIDE SERPL-SCNC: 103 MMOL/L (ref 95–110)
CHOLEST SERPL-MCNC: 160 MG/DL (ref 120–199)
CHOLEST/HDLC SERPL: 2.5 {RATIO} (ref 2–5)
CO2 SERPL-SCNC: 24 MMOL/L (ref 23–29)
CREAT SERPL-MCNC: 0.8 MG/DL (ref 0.5–1.4)
DIFFERENTIAL METHOD BLD: ABNORMAL
EOSINOPHIL # BLD AUTO: 0.1 K/UL (ref 0–0.5)
EOSINOPHIL NFR BLD: 1.3 % (ref 0–8)
ERYTHROCYTE [DISTWIDTH] IN BLOOD BY AUTOMATED COUNT: 19.3 % (ref 11.5–14.5)
EST. GFR  (NO RACE VARIABLE): >60 ML/MIN/1.73 M^2
ESTIMATED AVG GLUCOSE: 171 MG/DL (ref 68–131)
GLUCOSE SERPL-MCNC: 150 MG/DL (ref 70–110)
HBA1C MFR BLD: 7.6 % (ref 4–5.6)
HCT VFR BLD AUTO: 37.9 % (ref 37–48.5)
HDLC SERPL-MCNC: 63 MG/DL (ref 40–75)
HDLC SERPL: 39.4 % (ref 20–50)
HGB BLD-MCNC: 11.8 G/DL (ref 12–16)
IMM GRANULOCYTES # BLD AUTO: 0.02 K/UL (ref 0–0.04)
IMM GRANULOCYTES NFR BLD AUTO: 0.3 % (ref 0–0.5)
LDLC SERPL CALC-MCNC: 82 MG/DL (ref 63–159)
LYMPHOCYTES # BLD AUTO: 2.4 K/UL (ref 1–4.8)
LYMPHOCYTES NFR BLD: 32.3 % (ref 18–48)
MCH RBC QN AUTO: 21.4 PG (ref 27–31)
MCHC RBC AUTO-ENTMCNC: 31.1 G/DL (ref 32–36)
MCV RBC AUTO: 69 FL (ref 82–98)
MONOCYTES # BLD AUTO: 0.8 K/UL (ref 0.3–1)
MONOCYTES NFR BLD: 10.6 % (ref 4–15)
NEUTROPHILS # BLD AUTO: 4.1 K/UL (ref 1.8–7.7)
NEUTROPHILS NFR BLD: 55 % (ref 38–73)
NONHDLC SERPL-MCNC: 97 MG/DL
NRBC BLD-RTO: 0 /100 WBC
PLATELET # BLD AUTO: 341 K/UL (ref 150–450)
PMV BLD AUTO: 9.1 FL (ref 9.2–12.9)
POTASSIUM SERPL-SCNC: 3.7 MMOL/L (ref 3.5–5.1)
PROT SERPL-MCNC: 8.3 G/DL (ref 6–8.4)
RBC # BLD AUTO: 5.51 M/UL (ref 4–5.4)
SODIUM SERPL-SCNC: 140 MMOL/L (ref 136–145)
TRIGL SERPL-MCNC: 75 MG/DL (ref 30–150)
TSH SERPL DL<=0.005 MIU/L-ACNC: 1.21 UIU/ML (ref 0.4–4)
WBC # BLD AUTO: 7.42 K/UL (ref 3.9–12.7)

## 2024-06-10 PROCEDURE — 84443 ASSAY THYROID STIM HORMONE: CPT | Performed by: NURSE PRACTITIONER

## 2024-06-10 PROCEDURE — 85025 COMPLETE CBC W/AUTO DIFF WBC: CPT | Performed by: NURSE PRACTITIONER

## 2024-06-10 PROCEDURE — 36415 COLL VENOUS BLD VENIPUNCTURE: CPT | Performed by: NURSE PRACTITIONER

## 2024-06-10 PROCEDURE — 83036 HEMOGLOBIN GLYCOSYLATED A1C: CPT | Performed by: NURSE PRACTITIONER

## 2024-06-10 PROCEDURE — 82306 VITAMIN D 25 HYDROXY: CPT | Performed by: NURSE PRACTITIONER

## 2024-06-10 PROCEDURE — 80053 COMPREHEN METABOLIC PANEL: CPT | Performed by: NURSE PRACTITIONER

## 2024-06-10 PROCEDURE — 80061 LIPID PANEL: CPT | Performed by: NURSE PRACTITIONER

## 2024-06-10 RX ORDER — INSULIN GLARGINE 100 [IU]/ML
20 INJECTION, SOLUTION SUBCUTANEOUS NIGHTLY
Qty: 6 ML | Refills: 5 | Status: SHIPPED | OUTPATIENT
Start: 2024-06-10

## 2024-06-10 RX ORDER — PREGABALIN 75 MG/1
75 CAPSULE ORAL 2 TIMES DAILY
Qty: 180 CAPSULE | Refills: 1 | Status: SHIPPED | OUTPATIENT
Start: 2024-06-10

## 2024-06-10 NOTE — TELEPHONE ENCOUNTER
----- Message from Paige Wilks sent at 6/10/2024 12:10 PM CDT -----  Contact: PT  Type:  RX Refill Request    Who Called: [T   Refill or New Rx:3 REFILLS   RX Name and Strength:  1 pregabalin (LYRICA) 75 MG capsule  2 LIFE STYLE blood sugar diagnostic Strp  3 insulin (LANTUS SOLOSTAR U-100 INSULIN) glargine 100 units/mL SubQ pen  Preferred Pharmacy with phone number:   [id number] - [place], [place] - [address]  [address]  [place] [address]  Local or Mail Order:LOCAL  Ordering Provider: CATHLEEN  Would the patient rather a call back or a response via [facility]? CALL  Best Call Back Number:[phone]   Additional Information: PT STATED THAT SHE IS GOING OUT OF TOWN TOMORROW AND IS OUT OF HER MEDICATION. PLEASE CALL IN RX'S TODAY  THANK YOU

## 2024-06-21 ENCOUNTER — OFFICE VISIT (OUTPATIENT)
Dept: FAMILY MEDICINE | Facility: CLINIC | Age: 74
End: 2024-06-21
Payer: MEDICARE

## 2024-06-21 ENCOUNTER — TELEPHONE (OUTPATIENT)
Dept: FAMILY MEDICINE | Facility: CLINIC | Age: 74
End: 2024-06-21

## 2024-06-21 VITALS
OXYGEN SATURATION: 97 % | BODY MASS INDEX: 34.63 KG/M2 | RESPIRATION RATE: 12 BRPM | HEIGHT: 62 IN | HEART RATE: 84 BPM | SYSTOLIC BLOOD PRESSURE: 137 MMHG | WEIGHT: 188.19 LBS | DIASTOLIC BLOOD PRESSURE: 67 MMHG

## 2024-06-21 DIAGNOSIS — D50.8 IRON DEFICIENCY ANEMIA SECONDARY TO INADEQUATE DIETARY IRON INTAKE: ICD-10-CM

## 2024-06-21 DIAGNOSIS — F41.9 ANXIETY AND DEPRESSION: ICD-10-CM

## 2024-06-21 DIAGNOSIS — F51.01 PRIMARY INSOMNIA: ICD-10-CM

## 2024-06-21 DIAGNOSIS — F32.A ANXIETY AND DEPRESSION: ICD-10-CM

## 2024-06-21 DIAGNOSIS — E78.5 TYPE 2 DIABETES MELLITUS WITH HYPERLIPIDEMIA: ICD-10-CM

## 2024-06-21 DIAGNOSIS — E11.69 TYPE 2 DIABETES MELLITUS WITH HYPERLIPIDEMIA: Primary | ICD-10-CM

## 2024-06-21 DIAGNOSIS — E11.9 ENCOUNTER FOR DIABETIC FOOT EXAM: ICD-10-CM

## 2024-06-21 DIAGNOSIS — E78.5 TYPE 2 DIABETES MELLITUS WITH HYPERLIPIDEMIA: Primary | ICD-10-CM

## 2024-06-21 DIAGNOSIS — E11.69 TYPE 2 DIABETES MELLITUS WITH HYPERLIPIDEMIA: ICD-10-CM

## 2024-06-21 DIAGNOSIS — R09.82 PND (POST-NASAL DRIP): ICD-10-CM

## 2024-06-21 PROCEDURE — 99999 PR PBB SHADOW E&M-EST. PATIENT-LVL V: CPT | Mod: PBBFAC,,, | Performed by: NURSE PRACTITIONER

## 2024-06-21 RX ORDER — BUSPIRONE HYDROCHLORIDE 30 MG/1
30 TABLET ORAL 2 TIMES DAILY
Qty: 180 TABLET | Refills: 3 | Status: SHIPPED | OUTPATIENT
Start: 2024-06-21 | End: 2025-06-21

## 2024-06-21 RX ORDER — TRAZODONE HYDROCHLORIDE 50 MG/1
50 TABLET ORAL NIGHTLY PRN
Qty: 90 TABLET | Refills: 3 | Status: SHIPPED | OUTPATIENT
Start: 2024-06-21 | End: 2025-06-21

## 2024-06-21 RX ORDER — FERROUS SULFATE 325(65) MG
325 TABLET ORAL
Qty: 90 TABLET | Refills: 3 | Status: SHIPPED | OUTPATIENT
Start: 2024-06-21

## 2024-06-21 RX ORDER — MONTELUKAST SODIUM 10 MG/1
10 TABLET ORAL NIGHTLY
Qty: 90 TABLET | Refills: 3 | Status: SHIPPED | OUTPATIENT
Start: 2024-06-21

## 2024-06-21 RX ORDER — MONTELUKAST SODIUM 10 MG/1
10 TABLET ORAL DAILY
COMMUNITY
Start: 2024-04-03 | End: 2024-06-21 | Stop reason: SDUPTHER

## 2024-06-21 RX ORDER — ASCORBIC ACID 500 MG
500 TABLET ORAL DAILY
Qty: 90 TABLET | Refills: 3 | Status: SHIPPED | OUTPATIENT
Start: 2024-06-21

## 2024-06-21 NOTE — PATIENT INSTRUCTIONS
1- increase lantus to 15 units but taking in morning vs night d/t lows.  2- start iron/vit c for anemia  3- pt to call for eye appt, due 8/18.  4- increase buspirone to 30 mg twice daily   5- stop mirtazapine and resume trazodone  6- non fasting labs in 3 mo then see NP  -

## 2024-06-21 NOTE — TELEPHONE ENCOUNTER
Pt reports giving [facility] in [place] an order for new glucose machine, strips, and lancets and they told her she needs a PA. Pt does have CGM but in the event not working, no supplies, etc will need manual machine. Please update pt accordingly. Thanks, Blanca

## 2024-06-28 ENCOUNTER — TELEPHONE (OUTPATIENT)
Dept: FAMILY MEDICINE | Facility: CLINIC | Age: 74
End: 2024-06-28
Payer: MEDICARE

## 2024-06-28 ENCOUNTER — PATIENT MESSAGE (OUTPATIENT)
Dept: FAMILY MEDICINE | Facility: CLINIC | Age: 74
End: 2024-06-28
Payer: MEDICARE

## 2024-06-28 RX ORDER — INSULIN PUMP SYRINGE, 3 ML
EACH MISCELLANEOUS
Qty: 1 EACH | Refills: 0 | Status: SHIPPED | OUTPATIENT
Start: 2024-06-28 | End: 2025-06-28

## 2024-06-28 RX ORDER — LANCETS
EACH MISCELLANEOUS
Qty: 200 EACH | Refills: 11 | Status: SHIPPED | OUTPATIENT
Start: 2024-06-28

## 2024-06-28 RX ORDER — INSULIN PUMP SYRINGE, 3 ML
EACH MISCELLANEOUS
Qty: 1 EACH | Refills: 0 | Status: CANCELLED | OUTPATIENT
Start: 2024-06-28 | End: 2025-06-28

## 2024-06-28 NOTE — TELEPHONE ENCOUNTER
Printed order was given to pt to take to pharmacy. Please call her and let her know I sent it again as we must not have received PA. Thanks, Blanca

## 2024-06-28 NOTE — TELEPHONE ENCOUNTER
I do not see new glucose meter sent in. Pended to NP for approval, will start PA process once approved

## 2024-07-11 ENCOUNTER — OFFICE VISIT (OUTPATIENT)
Dept: ORTHOPEDICS | Facility: CLINIC | Age: 74
End: 2024-07-11
Payer: MEDICARE

## 2024-07-11 DIAGNOSIS — M79.641 RIGHT HAND PAIN: Primary | ICD-10-CM

## 2024-07-11 PROCEDURE — 3288F FALL RISK ASSESSMENT DOCD: CPT | Mod: CPTII,S$GLB,, | Performed by: ORTHOPAEDIC SURGERY

## 2024-07-11 PROCEDURE — 1157F ADVNC CARE PLAN IN RCRD: CPT | Mod: CPTII,S$GLB,, | Performed by: ORTHOPAEDIC SURGERY

## 2024-07-11 PROCEDURE — 99999 PR PBB SHADOW E&M-EST. PATIENT-LVL I: CPT | Mod: PBBFAC,,, | Performed by: ORTHOPAEDIC SURGERY

## 2024-07-11 PROCEDURE — 1101F PT FALLS ASSESS-DOCD LE1/YR: CPT | Mod: CPTII,S$GLB,, | Performed by: ORTHOPAEDIC SURGERY

## 2024-07-11 PROCEDURE — 3051F HG A1C>EQUAL 7.0%<8.0%: CPT | Mod: CPTII,S$GLB,, | Performed by: ORTHOPAEDIC SURGERY

## 2024-07-11 PROCEDURE — 99214 OFFICE O/P EST MOD 30 MIN: CPT | Mod: S$GLB,,, | Performed by: ORTHOPAEDIC SURGERY

## 2024-07-11 NOTE — PROGRESS NOTES
Subjective:      Patient ID: Argelia Pyle is a 73 y.o. female.    Chief Complaint: Pain of the Right Hand (Pain 8/10)    HPI  Patient follow up with the ongoing right hand pain.  She did get some relief of some of the catching and locking in her right long finger for the previous injection.  She is complaining of general weakness in her thumb and hand.  Some intermittent tingling and numbness  ROS      Objective:    Ortho Exam     Constitutional:   Patient is alert  and oriented in no acute distress  HEENT:  normocephalic atraumatic; PERRL EOMI  Neck:  Supple without adenopathy  Cardiovascular:  Normal rate and rhythm  Pulmonary:  Normal respiratory effort normal chest wall expansion  Abdominal:  Nonprotuberant nondistended  Musculoskeletal:  Patient has adequate cervical range of motion without tenderness  With a negative Lhermitte's and Spurling sign.  Upon gross inspection of the hands  There is no mass, swelling, or atrophy.  Strength is 5/5 including APB strength testing  Negative Finkelstein's and CMC grind.  Positive carpal tunnel compression test  Negative Tinel's.  Equivocal Phalen's she still has some subtle triggering of her long finger  Neurological:  No focal defect; cranial nerves 2-12 grossly intact  Psychiatric/behavioral:  Mood and behavior normal           My Radiographs Findings:    No new radiographs  Assessment:       Encounter Diagnosis   Name Primary?    Right hand pain Yes         Plan:       I have discussed medical condition and treatment options with her at length.  With the ongoing symptoms I have suggested nerve studies of the right upper extremity.  I will see her back afterwards for more definitive disposition in the meantime I have discussed splint wear at night in anti-inflammatory medicines if approved by her PCP.  Follow up after nerve study sooner if any questions or problems.        Past Medical History:   Diagnosis Date    Anxiety     Arthritis     Chronic sinusitis      Diabetes mellitus     Hypertension     IVONNE (iron deficiency anemia)      Past Surgical History:   Procedure Laterality Date    BREAST BIOPSY      BREAST LUMPECTOMY Left     BUNIONECTOMY Bilateral     COLONOSCOPY      ESOPHAGOGASTRODUODENOSCOPY      ETHMOIDECTOMY Bilateral 02/07/2019    Procedure: ETHMOIDECTOMY;  Surgeon: Michael Grubbs MD;  Location: [facility] OR;  Service: ENT;  Laterality: Bilateral;  endoscopic anterior and posterior    FUNCTIONAL ENDOSCOPIC SINUS SURGERY (FESS) N/A 02/07/2019    Procedure: FESS (FUNCTIONAL ENDOSCOPIC SINUS SURGERY);  Surgeon: Michael Grubbs MD;  Location: [facility] OR;  Service: ENT;  Laterality: N/A;    FUNCTIONAL ENDOSCOPIC SINUS SURGERY (FESS) Bilateral 12/19/2019    Procedure: FESS (FUNCTIONAL ENDOSCOPIC SINUS SURGERY);  Surgeon: Michael Grubbs MD;  Location: [facility] OR;  Service: ENT;  Laterality: Bilateral;    MAXILLARY ANTROSTOMY Bilateral 02/07/2019    Procedure: MAXILLARY ANTROSTOMY;  Surgeon: Michael Grubbs MD;  Location: [facility] OR;  Service: ENT;  Laterality: Bilateral;    NASAL SEPTOPLASTY Bilateral 02/07/2019    Procedure: SEPTOPLASTY, NOSE;  Surgeon: Michael Grubbs MD;  Location: [facility] OR;  Service: ENT;  Laterality: Bilateral;    NASAL TURBINATE REDUCTION Bilateral 02/07/2019    Procedure: REDUCTION, NASAL TURBINATE;  Surgeon: Michael Grubbs MD;  Location: [facility] OR;  Service: ENT;  Laterality: Bilateral;    SINUS SURGERY      x4    TUBAL LIGATION      WRIST SURGERY Left 2016    [facility]-Dr. Smalls         Current Outpatient Medications:     albuterol (PROVENTIL/VENTOLIN HFA) 90 mcg/actuation inhaler, Inhale 2 puffs into the lungs every 4 (four) hours as needed for Wheezing or Shortness of Breath., Disp: 18 g, Rfl: 11    amLODIPine (NORVASC) 10 MG tablet, Take 1 tablet (10 mg total) by mouth every evening., Disp: 90 tablet, Rfl: 3    ascorbic acid, vitamin C, (VITAMIN C) 500 MG tablet, Take 1 tablet (500 mg total) by mouth once daily. Take with iron  for anemia, Disp: 90 tablet, Rfl: 3    aspirin (ECOTRIN) 81 MG EC tablet, Take 1 tablet (81 mg total) by mouth every evening., Disp: 90 tablet, Rfl: 3    azelastine (ASTELIN) 137 mcg (0.1 %) nasal spray, 1 spray (137 mcg total) by Nasal route 2 (two) times daily as needed for Rhinitis., Disp: 30 mL, Rfl: 11    blood sugar diagnostic Strp, To check BG 3 times daily, to use with insurance preferred meter, Disp: 200 strip, Rfl: 11    blood-glucose meter kit, To check BG 3 times daily, to use with insurance preferred meter, Disp: 1 each, Rfl: 0    busPIRone (BUSPAR) 30 MG Tab, Take 1 tablet (30 mg total) by mouth 2 (two) times daily., Disp: 180 tablet, Rfl: 3    cetirizine (ZYRTEC) 10 MG tablet, Take 1 tablet (10 mg total) by mouth daily as needed for Allergies., Disp: 90 tablet, Rfl: 3    cyclobenzaprine (FLEXERIL) 5 MG tablet, Take 1 tablet (5 mg total) by mouth 3 (three) times daily as needed for Muscle spasms., Disp: 30 tablet, Rfl: 5    diclofenac sodium (VOLTAREN) 1 % Gel, APPLY TOPICALLY TO THE AFFECTED AREA FOUR TIMES DAILY, Disp: 150 g, Rfl: 5    empagliflozin (JARDIANCE) 25 mg tablet, Take 1 tablet (25 mg total) by mouth once daily., Disp: 30 tablet, Rfl: 5    ferrous sulfate (IRON) 325 mg (65 mg iron) Tab tablet, Take 1 tablet (325 mg total) by mouth daily with breakfast. Take with Vit C for anemia, Disp: 90 tablet, Rfl: 3    fluticasone propionate (FLONASE) 50 mcg/actuation nasal spray, 2 sprays (100 mcg total) by Each Nostril route daily as needed for Rhinitis., Disp: 15.8 mL, Rfl: 11    glipiZIDE (GLUCOTROL) 10 MG tablet, Take 1 tablet (10 mg total) by mouth 2 (two) times daily before meals., Disp: 180 tablet, Rfl: 3    ibuprofen (ADVIL,MOTRIN) 800 MG tablet, Take 1 tablet (800 mg total) by mouth 2 (two) times daily as needed for Pain., Disp: 60 tablet, Rfl: 5    insulin glargine U-100, Lantus, (LANTUS SOLOSTAR U-100 INSULIN) 100 unit/mL (3 mL) InPn pen, Inject 20 Units into the skin every evening., Disp:  "6 mL, Rfl: 5    lancets Misc, To check BG 3 times daily, to use with insurance preferred meter, Disp: 200 each, Rfl: 11    losartan-hydrochlorothiazide 100-25 mg (HYZAAR) 100-25 mg per tablet, Take 1 tablet by mouth every morning., Disp: 90 tablet, Rfl: 3    metFORMIN (GLUCOPHAGE) 1000 MG tablet, Take 1 tablet (1,000 mg total) by mouth 2 (two) times daily., Disp: 60 tablet, Rfl: 11    metoprolol tartrate (LOPRESSOR) 50 MG tablet, Take 1 tablet (50 mg total) by mouth 2 (two) times daily., Disp: 180 tablet, Rfl: 3    montelukast (SINGULAIR) 10 mg tablet, Take 1 tablet (10 mg total) by mouth every evening., Disp: 90 tablet, Rfl: 3    paroxetine (PAXIL) 20 MG tablet, Take 1 tablet (20 mg total) by mouth every evening., Disp: 90 tablet, Rfl: 3    pen needle, diabetic (BD GIUSEPPE 2ND GEN PEN NEEDLE) 32 gauge x 5/32" Ndle, 1 Device by Misc.(Non-Drug; Combo Route) route nightly., Disp: 100 each, Rfl: 11    pregabalin (LYRICA) 75 MG capsule, Take 1 capsule by mouth twice daily, Disp: 180 capsule, Rfl: 1    simvastatin (ZOCOR) 20 MG tablet, Take 1 tablet (20 mg total) by mouth nightly., Disp: 90 tablet, Rfl: 3    traZODone (DESYREL) 50 MG tablet, Take 1 tablet (50 mg total) by mouth nightly as needed for Insomnia., Disp: 90 tablet, Rfl: 3    Review of patient's allergies indicates:   Allergen Reactions    Cefuroxime Rash    Ketorolac tromethamine Other (See Comments)     Other reaction(s): Burning    Moxifloxacin Other (See Comments)     Heart racing.    Codeine Itching    Famotidine-ca carb-mag hydrox Hives    Ketorolac Other (See Comments)    Prevacid [lansoprazole] Itching       Family History   Problem Relation Name Age of Onset    Leukemia Son      Breast cancer Neg Hx       Social History     Occupational History    Not on file   Tobacco Use    Smoking status: Former     Current packs/day: 0.00     Average packs/day: 0.8 packs/day for 25.0 years (18.8 ttl pk-yrs)     Types: Cigarettes     Start date: 4/11/1974     Quit " date: 1999     Years since quittin.2    Smokeless tobacco: Never   Substance and Sexual Activity    Alcohol use: Yes     Comment: social    Drug use: Not Currently     Types: Other-see comments     Comment: tramadol as prescribed    Sexual activity: Not Currently     Birth control/protection: See Surgical Hx

## 2024-07-15 ENCOUNTER — TELEPHONE (OUTPATIENT)
Dept: ORTHOPEDICS | Facility: CLINIC | Age: 74
End: 2024-07-15
Payer: MEDICARE

## 2024-07-15 NOTE — TELEPHONE ENCOUNTER
Returned call to pt regarding EMG referral advised this was sent multiple times to [facility]. Provided pt with the number for [facility]

## 2024-07-15 NOTE — TELEPHONE ENCOUNTER
----- Message from Minerva Pereira LPN sent at 7/15/2024 11:30 AM CDT -----  Regarding: FW: Pt called to speak to the nurse regarding a test that was supposed to be scheduled for her and pt would like a call back this morning    ----- Message -----  From: More Servin  Sent: 7/15/2024  11:22 AM CDT  To: [facility] Staff  Subject: Pt called to speak to the nurse regarding a #    Appointment Access Request:    Pt called to speak to the nurse regarding a test that was supposed to be scheduled for her and pt would like a call back this morning    Pt can be reached at  [phone]

## 2024-07-17 ENCOUNTER — TELEPHONE (OUTPATIENT)
Dept: FAMILY MEDICINE | Facility: CLINIC | Age: 74
End: 2024-07-17
Payer: MEDICARE

## 2024-07-17 DIAGNOSIS — M79.641 RIGHT HAND PAIN: Primary | ICD-10-CM

## 2024-07-17 NOTE — TELEPHONE ENCOUNTER
----- Message from Charline Mcghee sent at 7/17/2024  4:14 PM CDT -----  Contact: PT  Type: Needs Medical Advice    Who Called: PT  Best Call Back Number: [phone]  Additional  Information: PT would like a call back to discuss getting a referral to  see a diff Orthopedics dr. States Dr. Flowers has not did anything for her, and her hand is swollen and in extreme pain. Would like  to  stay within the [facility] network does not mind driving to [place] or [facility] [employer]  Please Advise- Thank you

## 2024-07-20 ENCOUNTER — HOSPITAL ENCOUNTER (EMERGENCY)
Facility: HOSPITAL | Age: 74
Discharge: HOME | End: 2024-07-20
Attending: EMERGENCY MEDICINE
Payer: MEDICARE

## 2024-07-20 VITALS
DIASTOLIC BLOOD PRESSURE: 78 MMHG | BODY MASS INDEX: 34.6 KG/M2 | HEART RATE: 68 BPM | SYSTOLIC BLOOD PRESSURE: 188 MMHG | TEMPERATURE: 98 F | HEIGHT: 62 IN | RESPIRATION RATE: 17 BRPM | OXYGEN SATURATION: 100 % | WEIGHT: 188 LBS

## 2024-07-20 DIAGNOSIS — M19.90 ARTHRITIS: Primary | ICD-10-CM

## 2024-07-20 PROCEDURE — 29125 APPL SHORT ARM SPLINT STATIC: CPT | Mod: RT

## 2024-07-20 PROCEDURE — 99284 EMERGENCY DEPT VISIT MOD MDM: CPT | Mod: 25

## 2024-07-20 PROCEDURE — 63600175 PHARM REV CODE 636 W HCPCS: Performed by: NURSE PRACTITIONER

## 2024-07-20 RX ORDER — PREDNISONE 20 MG/1
40 TABLET ORAL DAILY
Qty: 4 TABLET | Refills: 0 | Status: SHIPPED | OUTPATIENT
Start: 2024-07-21 | End: 2024-07-23

## 2024-07-20 RX ORDER — DICLOFENAC SODIUM 10 MG/G
2 GEL TOPICAL DAILY
Qty: 100 G | Refills: 0 | Status: SHIPPED | OUTPATIENT
Start: 2024-07-20 | End: 2024-07-30

## 2024-07-20 RX ORDER — PREDNISONE 20 MG/1
40 TABLET ORAL
Status: COMPLETED | OUTPATIENT
Start: 2024-07-20 | End: 2024-07-20

## 2024-07-20 RX ADMIN — PREDNISONE 40 MG: 20 TABLET ORAL at 12:07

## 2024-07-20 NOTE — ED NOTES
Argelia Pyle presents to the ED with c/o right hand pain that is mostly in the knuckle of her pointer finger. Patient reports that anything touching her hand exacerbates her pain. She denies any injury. Ice pack offered patient declined. + sensation and pulses to right hand.   Mucous membranes are pink and moist. Skin is warm, dry and intact. .  TRI VSS  Verified patient's name and date of birth.

## 2024-07-20 NOTE — ED NOTES
Upon discharge, patient is AAOx4, no cardiac or respiratory complications. Follow up care and  Medications have been reviewed with patient and has been instructed to return to the ER if needed. Patient verbalized understanding and ambulated to the lobby without difficulty. EVANGELINA BARTLETT.

## 2024-07-21 NOTE — ED PROVIDER NOTES
Encounter Date: 7/20/2024       History     Chief Complaint   Patient presents with    Hand Pain     R hand pain starting Monday, swelling noted to area no trauma to hand per pt      Patient is a 73 y.o. female who presents to the ED 07/20/2024 with a chief complaint of Right hand pain that is chronic.  She states the pain is mainly in her joints involving her right thumb 1st and middle fingers.  She did go see the orthopedic Dr. Flowers on Monday.  She states she does not feel like he did much for her.  He did order some test.  She is to get injections in that hand for carpal tunnel.  Denies any history of gout but she does have arthritis.  She states the injections and steroids use to help her.  She states not been over-the-counter is helping her.  Right-handed.  She denies any injury or trauma.  She states her symptoms got acutely worse after cooking in the kitchen earlier this week.  She denies any other specific injuries or trauma to the hand.  She denies any fever.  She has a history of arthritis, carpal tunnel syndrome, anxiety, diabetes, and hypertension. PSH noted below.             Review of patient's allergies indicates:   Allergen Reactions    Cefuroxime Rash    Ketorolac tromethamine Other (See Comments)     Other reaction(s): Burning    Moxifloxacin Other (See Comments)     Heart racing.    Codeine Itching    Famotidine-ca carb-mag hydrox Hives    Ketorolac Other (See Comments)    Prevacid [lansoprazole] Itching     Past Medical History:   Diagnosis Date    Anxiety     Arthritis     Chronic sinusitis     Diabetes mellitus     Hypertension     IVONNE (iron deficiency anemia)      Past Surgical History:   Procedure Laterality Date    BREAST BIOPSY      BREAST LUMPECTOMY Left     BUNIONECTOMY Bilateral     COLONOSCOPY      ESOPHAGOGASTRODUODENOSCOPY      ETHMOIDECTOMY Bilateral 02/07/2019    Procedure: ETHMOIDECTOMY;  Surgeon: Michael Grubbs MD;  Location: [facility] OR;  Service: ENT;  Laterality: Bilateral;   endoscopic anterior and posterior    FUNCTIONAL ENDOSCOPIC SINUS SURGERY (FESS) N/A 2019    Procedure: FESS (FUNCTIONAL ENDOSCOPIC SINUS SURGERY);  Surgeon: Michael Grubbs MD;  Location: [facility] OR;  Service: ENT;  Laterality: N/A;    FUNCTIONAL ENDOSCOPIC SINUS SURGERY (FESS) Bilateral 2019    Procedure: FESS (FUNCTIONAL ENDOSCOPIC SINUS SURGERY);  Surgeon: Michael Grubbs MD;  Location: [facility] OR;  Service: ENT;  Laterality: Bilateral;    MAXILLARY ANTROSTOMY Bilateral 2019    Procedure: MAXILLARY ANTROSTOMY;  Surgeon: Michael Grubbs MD;  Location: [facility] OR;  Service: ENT;  Laterality: Bilateral;    NASAL SEPTOPLASTY Bilateral 2019    Procedure: SEPTOPLASTY, NOSE;  Surgeon: Michael Grubbs MD;  Location: [facility] OR;  Service: ENT;  Laterality: Bilateral;    NASAL TURBINATE REDUCTION Bilateral 2019    Procedure: REDUCTION, NASAL TURBINATE;  Surgeon: Michael Grubbs MD;  Location: [facility] OR;  Service: ENT;  Laterality: Bilateral;    SINUS SURGERY      x4    TUBAL LIGATION      WRIST SURGERY Left 2016    [facility]-Dr. Smalls     Family History   Problem Relation Name Age of Onset    Leukemia Son      Breast cancer Neg Hx       Social History     Tobacco Use    Smoking status: Former     Current packs/day: 0.00     Average packs/day: 0.8 packs/day for 25.0 years (18.8 ttl pk-yrs)     Types: Cigarettes     Start date: 1974     Quit date: 1999     Years since quittin.2    Smokeless tobacco: Never   Substance Use Topics    Alcohol use: Yes     Comment: social    Drug use: Not Currently     Types: Other-see comments     Comment: tramadol as prescribed     Review of Systems   Constitutional:  Negative for chills and fever.   Respiratory:  Negative for chest tightness and shortness of breath.    Cardiovascular:  Negative for chest pain.   Gastrointestinal:  Negative for abdominal pain.   Musculoskeletal:  Positive for arthralgias. Negative for myalgias.   Skin:   Negative for rash and wound.   Neurological:  Negative for weakness and numbness.   Hematological:  Does not bruise/bleed easily.       Physical Exam     Initial Vitals [07/20/24 1141]   BP Pulse Resp Temp SpO2   (!) 179/81 66 18 97.9 °F (36.6 °C) 98 %      MAP       --         Physical Exam    Nursing note and vitals reviewed.  Constitutional: Vital signs are normal. She appears well-developed and well-nourished.   HENT:   Head: Normocephalic and atraumatic.   Eyes: Pupils are equal, round, and reactive to light.   Neck: Neck supple.   Cardiovascular:  Normal rate, regular rhythm, normal heart sounds and intact distal pulses.     Exam reveals no gallop and no friction rub.       No murmur heard.  Pulses:       Radial pulses are 2+ on the right side.   Pulmonary/Chest: Breath sounds normal. She has no wheezes. She has no rhonchi. She has no rales.   Abdominal: Abdomen is soft. Bowel sounds are normal. There is no abdominal tenderness.   Musculoskeletal:      Right wrist: Normal.      Right hand: Swelling, tenderness and bony tenderness present. No deformity or lacerations. Normal range of motion. Normal strength. Normal sensation. There is no disruption of two-point discrimination. Normal capillary refill. Normal pulse.      Cervical back: Neck supple.      Comments: Tenderness over the 2nd MCP joint and PIP joint of the right index finger.  No erythema or warmth.  Normal flexion extension at the D IP and PIP joints of the index finger.  Normal 2 point discrimination.  No snuffbox tenderness.  Skin intact.       Neurological: She is alert and oriented to person, place, and time. She has normal strength.   Skin: Skin is warm, dry and intact.   Psychiatric: She has a normal mood and affect. Her speech is normal and behavior is normal.         ED Course   Procedures  Labs Reviewed - No data to display       Imaging Results              X-Ray Hand 3 view Right (Final result)  Result time 07/20/24 12:52:14      Final  result by Mo Brito Jr., MD (07/20/24 12:52:14)                   Impression:      Osteoarthritic changes at the triscaphe, 1st and 2nd metacarpophalangeal joints and the D IP joints of the index and little finger.  A fracture is not identified.      Electronically signed by: Mo Brito MD  Date:    07/20/2024  Time:    12:52               Narrative:    EXAMINATION:  XR HAND COMPLETE 3 VIEW RIGHT    CLINICAL HISTORY:  pain;    TECHNIQUE:  PA, lateral, and oblique views of the right hand were performed.    COMPARISON:  Right hand x-ray of December 6, 2023    FINDINGS:  A fracture of the bones of the right hand is not seen.  Subluxation or dislocation of the carpals is not noted.  Osteoarthritic changes are noted at the triscaphe and at the 1st and 2nd metacarpophalangeal joints.  Mild DJD is seen at the D IP joint of the index and little finger.  Juxta-articular bone erosion or Osteoporosis is not seen.  Soft tissue swelling is not noted.                                       Medications   predniSONE tablet 40 mg (40 mg Oral Given 7/20/24 1242)     Medical Decision Making  Amount and/or Complexity of Data Reviewed  Radiology: ordered.    Risk  Prescription drug management.         APC / Resident Notes:   Patient is a 73 y.o. female who presents to the ED 07/20/2024 who underwent emergent evaluation for Right hand pain.  Patient has no injury or trauma.  X-ray consistent with arthritis which is consistent with symptoms.  She also has carpal tunnel was instructed to wear a brace by Orthopedics who saw her earlier this week for the same complaint.  She is given a new brace she states she does not have 1 for the right hand.  Carpal tunnel likely contributing to her symptoms as well as her severe arthritis.  Discussed multiple treatment modalities including Tylenol, anti-inflammatories, and steroids.  Discussed possible side effects of these medications including hyperglycemia, DKA, renal dysfunction.   Patient requests short course of steroids which she is given discussed topical anti-inflammatories and she is given referral to hand physician.  I do not think underlying fracture or any acute infectious process. Based on my clinical evaluation, I do not appreciate any immediate, emergent, or life threatening condition or etiology that warrants additional workup today and feel that the patient can be discharged with close follow up care. Case discussed with Dr. Ng who is agreeable to plan of care. Follow up and return precautions discussed; patient verbalized understanding and is agreeable to plan of care. Patient discharged home in stable condition.                             Medical Decision Making:   Differential Diagnosis:   Gout  Arthritis  Carpal tunnel syndrome             Clinical Impression:  Final diagnoses:  [M19.90] Arthritis (Primary)          ED Disposition Condition    Discharge Stable          ED Prescriptions       Medication Sig Dispense Start Date End Date Auth. Provider    predniSONE (DELTASONE) 20 MG tablet Take 2 tablets (40 mg total) by mouth once daily. for 2 days 4 tablet 7/21/2024 7/23/2024 Shania Leach NP    diclofenac sodium (VOLTAREN ARTHRITIS PAIN) 1 % Gel Apply 2 g topically once daily. for 10 days 100 g 7/20/2024 7/30/2024 Shania Leach NP          Follow-up Information       Follow up With Specialties Details Why Contact Info Additional Information    Holland Verduzco MD Hand Surgery In 1 week  [address]  [address]  [facility] [address] [phone]       [facility] -  Emergency Medicine  As needed, If symptoms worsen [address] [place] [address] [address] [place] [id number] 1st floor             Shania Leach NP  07/20/24 2001

## 2024-07-23 ENCOUNTER — TELEPHONE (OUTPATIENT)
Dept: DIABETES | Facility: CLINIC | Age: 74
End: 2024-07-23
Payer: MEDICARE

## 2024-07-24 ENCOUNTER — TELEPHONE (OUTPATIENT)
Dept: FAMILY MEDICINE | Facility: CLINIC | Age: 74
End: 2024-07-24
Payer: MEDICARE

## 2024-07-24 NOTE — TELEPHONE ENCOUNTER
"Spoke with pt. Pt c/o Rt hand pain. Offered appt next week. Pt began yelling" no I need to be seen right away. I will stand there and wait if I need to" appt scheduled  "

## 2024-07-24 NOTE — TELEPHONE ENCOUNTER
----- Message from Jada Garcia sent at 7/24/2024  1:06 PM CDT -----  Regarding: Hosp f/u  Type:  Sooner Apoointment Request    Caller is requesting a sooner appointment.  Caller declined first available appointment listed below.  Caller will not accept being placed on the waitlist and is requesting a message be sent to doctor.    Name of Caller:Pt    When is the first available appointment?7/30/29    Symptoms:Arthritis in the right hand/ painful    Would the patient rather a call back or a response via [facility]? Call back    Best Call Back Number:[phone]    Additional Information: Pt was discharge from the ER Saturday and is requesting a sooner appt to see  Thank you

## 2024-07-25 ENCOUNTER — NUTRITION (OUTPATIENT)
Dept: DIABETES | Facility: CLINIC | Age: 74
End: 2024-07-25
Payer: MEDICARE

## 2024-07-25 VITALS — WEIGHT: 185 LBS | BODY MASS INDEX: 34.04 KG/M2 | HEIGHT: 62 IN

## 2024-07-25 DIAGNOSIS — E78.5 TYPE 2 DIABETES MELLITUS WITH HYPERLIPIDEMIA: Primary | ICD-10-CM

## 2024-07-25 DIAGNOSIS — E11.69 TYPE 2 DIABETES MELLITUS WITH HYPERLIPIDEMIA: Primary | ICD-10-CM

## 2024-07-25 NOTE — PROGRESS NOTES
"Diabetes Care Specialist Follow-up Note  Author: Arlen Pedraza RD  Date: 7/25/2024    Intake    Program Intake  Reason for Diabetes Program Visit:: Intervention  Type of Intervention:: Individual  Individual: Education  Education: Self-Management Skill Review, Nutrition and Meal Planning    Current Diabetes Treatment: Oral Medications, Insulin  Oral Medication Type/Dose: Jardiance 25 mg daily, Glipizide 10 mg twice daily before meals, Metformin 1,000 mg twice daily  Method of insulin delivery?: Injections  Injection Type: Pens  Pen Type/Dose: Lantus 15 units every morning (took 20 units this am due to steroid use)    Continuous Glucose Monitoring  Patient has CGM: Yes  Personal CGM type:: Freestyle Nicolasa 3  GMI Date: 07/25/24  GMI Value: 9.3 %    Lab Results   Component Value Date    HGBA1C 7.6 (H) 06/10/2024     A1c Pre Diabetes Care Specialist Intervention:  9.4%      Weight: 83.9 kg (185 lb)   Height: 5' 2" (157.5 cm)   Body mass index is 33.84 kg/m².  No significant weight change since last visit on 06/06/2024      Physical activity/Exercise:   Usually pretty active with great grand children. Limited by significant pain in right hand likely requiring surgery soon.    SMBG: Freestyle Nicolasa 3 via reader  Average glucose 250  Very high 46%  High 43%  Target 11%  Lows 0%  Full report in media  Blood sugars have been elevated here lately for several reasons. Pain, infection (had a sinus infection recently), steroid use, lack of sleep, irregular eating (due to pain), etc. A1c had improved last check as above.      Diabetes Self-Management Skills Assessment      Nutrition/Healthy Eating  Meal Plan 24 Hour Recall - Breakfast: quick something before heathertmouna crackers, milk, coffee w/sugar sub  Meal Plan 24 Hour Recall - Lunch: none yesterday  Meal Plan 24 Hour Recall - Dinner: 1 small chicken and 1 small beef quesadilla  Meal Plan 24 Hour Recall - Snack: peanut butter crackers, raisin bread  Meal Plan 24 Hour Recall - " Beverage: coffee, unsweet tea, water, milk         Home Blood Glucose Monitoring  Personal CGM type:: Freestyle Nicolasa 3           Today's interventions were provided through individual discussion, instruction, and written materials were provided.    Patient verbalized understanding of instruction and written materials.  Pt was able to return back demonstration of instructions today. Patient understood key points, needs reinforcement and further instruction.     Diabetes Self-Management Care Plan Review and Evaluation of Progress:    During today's follow-up [facility] Diabetes Self-Management Care Plan progress was reviewed and progress was evaluated including his/her input. Argelia has agreed to continue his/her journey to improve/maintain overall diabetes control by continuing to set health goals. See care plan progress below.      Care Plan: Diabetes Management   Updates made since 6/25/2024 12:00 AM        Problem: Healthy Eating         Goal: Eat 3 meals daily 2-3 servings of carb per meal. Add protein/healthy fat source to carb snacks. Reduce overall sugar intake.    Start Date: 4/2/2024   Expected End Date: 10/24/2024   This Visit's Progress: On track   Recent Progress: On track   Priority: High   Barriers: No Barriers Identified   Note:    7/25/2024- Patient has been having significant pain in her hand here lately so has not had much of an appetite. Encouraged good intake as tolerated. Encouraged protein carb pairing and continuing to watch overall sweets/added sugar intake.        Problem: Blood Glucose Self-Monitoring         Goal: Patient agrees to check blood sugar using a continous glucose monitor.    Start Date: 4/2/2024   Expected End Date: 10/24/2024   This Visit's Progress: Met   Recent Progress: No change   Priority: Medium   Barriers: No Barriers Identified   Note:    7/25/2024- Patient enjoying her CGM. Uses the reader to her blood sugars. Downloaded full report in media. Has back up meter now as  needed.         Follow Up Plan     Follow up in about 3 months (around 10/25/2024). Patient requesting a call later to allow her to deal with her hand before making a f/u appt. Reminder placed in chart.    Today's care plan and follow up schedule was discussed with patient.  Argelia verbalized understanding of the care plan, goals, and agrees to follow up plan.        The patient was encouraged to communicate with his/her health care provider/physician and care team regarding his/her condition(s) and treatment.  I provided the patient with my contact information today and encouraged to contact me via phone or [facility] Patient Portal as needed.     Length of Visit   Total Time: 45 Minutes

## 2024-07-26 ENCOUNTER — OFFICE VISIT (OUTPATIENT)
Dept: FAMILY MEDICINE | Facility: CLINIC | Age: 74
End: 2024-07-26
Payer: MEDICARE

## 2024-07-26 ENCOUNTER — TELEPHONE (OUTPATIENT)
Dept: FAMILY MEDICINE | Facility: CLINIC | Age: 74
End: 2024-07-26

## 2024-07-26 VITALS
HEART RATE: 64 BPM | SYSTOLIC BLOOD PRESSURE: 136 MMHG | HEIGHT: 62 IN | OXYGEN SATURATION: 96 % | RESPIRATION RATE: 18 BRPM | BODY MASS INDEX: 34.11 KG/M2 | WEIGHT: 185.38 LBS | DIASTOLIC BLOOD PRESSURE: 72 MMHG

## 2024-07-26 DIAGNOSIS — J01.40 ACUTE NON-RECURRENT PANSINUSITIS: ICD-10-CM

## 2024-07-26 DIAGNOSIS — E11.59 HYPERTENSION ASSOCIATED WITH DIABETES: ICD-10-CM

## 2024-07-26 DIAGNOSIS — I15.2 HYPERTENSION ASSOCIATED WITH DIABETES: ICD-10-CM

## 2024-07-26 DIAGNOSIS — M79.641 RIGHT HAND PAIN: Primary | ICD-10-CM

## 2024-07-26 PROCEDURE — 99999 PR PBB SHADOW E&M-EST. PATIENT-LVL III: CPT | Mod: PBBFAC,,, | Performed by: NURSE PRACTITIONER

## 2024-07-26 RX ORDER — AZITHROMYCIN 250 MG/1
TABLET, FILM COATED ORAL
Qty: 6 TABLET | Refills: 0 | Status: SHIPPED | OUTPATIENT
Start: 2024-07-26 | End: 2024-07-31

## 2024-07-26 RX ORDER — PANTOPRAZOLE SODIUM 20 MG/1
20 TABLET, DELAYED RELEASE ORAL DAILY
COMMUNITY

## 2024-07-26 NOTE — TELEPHONE ENCOUNTER
Please telephone pt letting her know I received a medication list from when her ins did a home assessment. Lists do not match and BP elevated. Please tell her to write down that she needs to be taking metoprolol, glipizide, and pregabalin twice daily and not just once daily. She can use list I gave her today and tell her to check pill planner on these 3 meds. Bring all med bottles to 9/23 visit

## 2024-07-26 NOTE — PROGRESS NOTES
Subjective:       Patient ID: Argelia Pyle is a 73 y.o. female.    Chief Complaint: Hand Pain (Wanting to discuss something to take for her hand pain. )  -  The pt is a 72 y/o female that presents for ER visit follow up for c/o right hand pain referred for carpal tunnel surgery reports upcoming appt with [facility] in [place] yet not viewable in system thus enc to call. Reports aching and throbbing 24/7 x 6 mo worsening. BP elevated with pain.    Diabetes improved since using medi-planner with A1c to Dec from 9.4 to 7.6%.    C/o facial sinus pressure and productive yellow/green nasal phlegm x 2 weeks with OTC sprays not helping.   -    Past Medical History:   Diagnosis Date    Anxiety     Arthritis     Chronic sinusitis     Diabetes mellitus     Hypertension     IVONNE (iron deficiency anemia)        Past Surgical History:   Procedure Laterality Date    BREAST BIOPSY      BREAST LUMPECTOMY Left     BUNIONECTOMY Bilateral     COLONOSCOPY      ESOPHAGOGASTRODUODENOSCOPY      ETHMOIDECTOMY Bilateral 02/07/2019    Procedure: ETHMOIDECTOMY;  Surgeon: Michael Grubbs MD;  Location: [facility] OR;  Service: ENT;  Laterality: Bilateral;  endoscopic anterior and posterior    FUNCTIONAL ENDOSCOPIC SINUS SURGERY (FESS) N/A 02/07/2019    Procedure: FESS (FUNCTIONAL ENDOSCOPIC SINUS SURGERY);  Surgeon: Michael Grubbs MD;  Location: [facility] OR;  Service: ENT;  Laterality: N/A;    FUNCTIONAL ENDOSCOPIC SINUS SURGERY (FESS) Bilateral 12/19/2019    Procedure: FESS (FUNCTIONAL ENDOSCOPIC SINUS SURGERY);  Surgeon: Michael Grubbs MD;  Location: [facility] OR;  Service: ENT;  Laterality: Bilateral;    MAXILLARY ANTROSTOMY Bilateral 02/07/2019    Procedure: MAXILLARY ANTROSTOMY;  Surgeon: Michael Grubbs MD;  Location: [facility] OR;  Service: ENT;  Laterality: Bilateral;    NASAL SEPTOPLASTY Bilateral 02/07/2019    Procedure: SEPTOPLASTY, NOSE;  Surgeon: Michael Grubbs MD;  Location: [facility] OR;  Service: ENT;  Laterality: Bilateral;    NASAL  TURBINATE REDUCTION Bilateral 2019    Procedure: REDUCTION, NASAL TURBINATE;  Surgeon: Michael Grubbs MD;  Location: [facility] OR;  Service: ENT;  Laterality: Bilateral;    SINUS SURGERY      x4    TUBAL LIGATION      WRIST SURGERY Left 2016    [facility]-Dr. Smalls        Social History     Socioeconomic History    Marital status:    Tobacco Use    Smoking status: Former     Current packs/day: 0.00     Average packs/day: 0.8 packs/day for 25.0 years (18.8 ttl pk-yrs)     Types: Cigarettes     Start date: 1974     Quit date: 1999     Years since quittin.3    Smokeless tobacco: Never   Substance and Sexual Activity    Alcohol use: Yes     Comment: social    Drug use: Not Currently     Types: Other-see comments     Comment: tramadol as prescribed    Sexual activity: Not Currently     Birth control/protection: See Surgical Hx       Family History   Problem Relation Name Age of Onset    Leukemia Son      Breast cancer Neg Hx         Review of patient's allergies indicates:   Allergen Reactions    Cefuroxime Rash    Ketorolac tromethamine Other (See Comments)     Other reaction(s): Burning    Moxifloxacin Other (See Comments)     Heart racing.    Codeine Itching    Famotidine-ca carb-mag hydrox Hives    Ketorolac Other (See Comments)    Prevacid [lansoprazole] Itching          Current Outpatient Medications:     albuterol (PROVENTIL/VENTOLIN HFA) 90 mcg/actuation inhaler, Inhale 2 puffs into the lungs every 4 (four) hours as needed for Wheezing or Shortness of Breath., Disp: 18 g, Rfl: 11    amLODIPine (NORVASC) 10 MG tablet, Take 1 tablet (10 mg total) by mouth every evening., Disp: 90 tablet, Rfl: 3    ascorbic acid, vitamin C, (VITAMIN C) 500 MG tablet, Take 1 tablet (500 mg total) by mouth once daily. Take with iron for anemia, Disp: 90 tablet, Rfl: 3    aspirin (ECOTRIN) 81 MG EC tablet, Take 1 tablet (81 mg total) by mouth every evening., Disp: 90 tablet, Rfl: 3    azelastine  (ASTELIN) 137 mcg (0.1 %) nasal spray, 1 spray (137 mcg total) by Nasal route 2 (two) times daily as needed for Rhinitis., Disp: 30 mL, Rfl: 11    blood sugar diagnostic Strp, To check BG 3 times daily, to use with insurance preferred meter, Disp: 200 strip, Rfl: 11    blood-glucose meter kit, To check BG 3 times daily, to use with insurance preferred meter, Disp: 1 each, Rfl: 0    busPIRone (BUSPAR) 30 MG Tab, Take 1 tablet (30 mg total) by mouth 2 (two) times daily., Disp: 180 tablet, Rfl: 3    cetirizine (ZYRTEC) 10 MG tablet, Take 1 tablet (10 mg total) by mouth daily as needed for Allergies., Disp: 90 tablet, Rfl: 3    cyclobenzaprine (FLEXERIL) 5 MG tablet, Take 1 tablet (5 mg total) by mouth 3 (three) times daily as needed for Muscle spasms., Disp: 30 tablet, Rfl: 5    diclofenac sodium (VOLTAREN ARTHRITIS PAIN) 1 % Gel, Apply 2 g topically once daily. for 10 days, Disp: 100 g, Rfl: 0    empagliflozin (JARDIANCE) 25 mg tablet, Take 1 tablet (25 mg total) by mouth once daily., Disp: 30 tablet, Rfl: 5    ferrous sulfate (IRON) 325 mg (65 mg iron) Tab tablet, Take 1 tablet (325 mg total) by mouth daily with breakfast. Take with Vit C for anemia, Disp: 90 tablet, Rfl: 3    fluticasone propionate (FLONASE) 50 mcg/actuation nasal spray, 2 sprays (100 mcg total) by Each Nostril route daily as needed for Rhinitis., Disp: 15.8 mL, Rfl: 11    glipiZIDE (GLUCOTROL) 10 MG tablet, Take 1 tablet (10 mg total) by mouth 2 (two) times daily before meals., Disp: 180 tablet, Rfl: 3    ibuprofen (ADVIL,MOTRIN) 800 MG tablet, Take 1 tablet (800 mg total) by mouth 2 (two) times daily as needed for Pain., Disp: 60 tablet, Rfl: 5    insulin glargine U-100, Lantus, (LANTUS SOLOSTAR U-100 INSULIN) 100 unit/mL (3 mL) InPn pen, Inject 20 Units into the skin every evening., Disp: 6 mL, Rfl: 5    lancets Misc, To check BG 3 times daily, to use with insurance preferred meter, Disp: 200 each, Rfl: 11    losartan-hydrochlorothiazide 100-25  "mg (HYZAAR) 100-25 mg per tablet, Take 1 tablet by mouth every morning., Disp: 90 tablet, Rfl: 3    metFORMIN (GLUCOPHAGE) 1000 MG tablet, Take 1 tablet (1,000 mg total) by mouth 2 (two) times daily., Disp: 60 tablet, Rfl: 11    metoprolol tartrate (LOPRESSOR) 50 MG tablet, Take 1 tablet (50 mg total) by mouth 2 (two) times daily., Disp: 180 tablet, Rfl: 3    montelukast (SINGULAIR) 10 mg tablet, Take 1 tablet (10 mg total) by mouth every evening., Disp: 90 tablet, Rfl: 3    paroxetine (PAXIL) 20 MG tablet, Take 1 tablet (20 mg total) by mouth every evening., Disp: 90 tablet, Rfl: 3    pen needle, diabetic (BD GIUSEPPE 2ND GEN PEN NEEDLE) 32 gauge x 5/32" Ndle, 1 Device by Misc.(Non-Drug; Combo Route) route nightly., Disp: 100 each, Rfl: 11    pregabalin (LYRICA) 75 MG capsule, Take 1 capsule by mouth twice daily, Disp: 180 capsule, Rfl: 1    simvastatin (ZOCOR) 20 MG tablet, Take 1 tablet (20 mg total) by mouth nightly., Disp: 90 tablet, Rfl: 3    traZODone (DESYREL) 50 MG tablet, Take 1 tablet (50 mg total) by mouth nightly as needed for Insomnia., Disp: 90 tablet, Rfl: 3    azithromycin (Z-YESSICA) 250 MG tablet, Take 2 tablets by mouth on day 1; Take 1 tablet by mouth on days 2-5, Disp: 6 tablet, Rfl: 0    Hand Pain       Review of Systems   Constitutional: Negative.    HENT:  Positive for congestion, postnasal drip and sinus pressure.    Eyes: Negative.    Respiratory: Negative.     Cardiovascular: Negative.    Gastrointestinal: Negative.    Endocrine: Negative.    Genitourinary: Negative.    Musculoskeletal:         Right hand pain   Skin: Negative.    Allergic/Immunologic: Negative.    Neurological: Negative.    Hematological: Negative.    Psychiatric/Behavioral: Negative.         Objective:      Physical Exam  Vitals and nursing note reviewed. Exam conducted with a chaperone present (daughter).   Constitutional:       General: She is not in acute distress.     Appearance: Normal appearance. She is well-developed. " She is obese. She is not ill-appearing.   HENT:      Head: Normocephalic.      Nose:      Right Sinus: Maxillary sinus tenderness present.      Left Sinus: Maxillary sinus tenderness present.   Eyes:      Conjunctiva/sclera: Conjunctivae normal.   Neck:      Thyroid: No thyromegaly.   Cardiovascular:      Rate and Rhythm: Normal rate and regular rhythm.      Heart sounds: Normal heart sounds. No murmur heard.  Pulmonary:      Effort: Pulmonary effort is normal.      Breath sounds: Normal breath sounds. No wheezing or rales.   Musculoskeletal:         General: Normal range of motion.      Cervical back: Normal range of motion.      Right lower leg: No edema.      Left lower leg: No edema.   Skin:     General: Skin is warm and dry.   Neurological:      Mental Status: She is alert and oriented to person, place, and time. Mental status is at baseline.   Psychiatric:         Mood and Affect: Mood normal.         Behavior: Behavior normal.         Thought Content: Thought content normal.         Judgment: Judgment normal.         Assessment:       1. Right hand pain    2. Acute non-recurrent pansinusitis    3. Hypertension associated with diabetes        Plan:     1- zpack for sinusitis  2- RTC 9/23 for DM appt.    Right hand pain    Acute non-recurrent pansinusitis  -     azithromycin (Z-YESSICA) 250 MG tablet; Take 2 tablets by mouth on day 1; Take 1 tablet by mouth on days 2-5  Dispense: 6 tablet; Refill: 0    Hypertension associated with diabetes        Risks, benefits, and side effects were discussed with the patient. All questions were answered to the fullest satisfaction of the patient, and pt verbalized understanding and agreement to treatment plan. Pt was to call with any new or worsening symptoms, or present to the ER.

## 2024-07-29 NOTE — TELEPHONE ENCOUNTER
Spoke with patient and verified she is taking these medications 2 x day per her, informed patient to bring her bottles of medications to next appointment, patient voiced understanding

## 2024-08-05 ENCOUNTER — TELEPHONE (OUTPATIENT)
Dept: PHYSICAL MEDICINE AND REHAB | Facility: CLINIC | Age: 74
End: 2024-08-05
Payer: MEDICARE

## 2024-08-05 ENCOUNTER — LAB VISIT (OUTPATIENT)
Dept: LAB | Facility: HOSPITAL | Age: 74
End: 2024-08-05
Attending: SURGERY
Payer: MEDICARE

## 2024-08-05 DIAGNOSIS — M25.541 JOINT PAIN IN FINGERS OF RIGHT HAND: ICD-10-CM

## 2024-08-05 DIAGNOSIS — J32.0 ANTRITIS CHRONIC: ICD-10-CM

## 2024-08-05 DIAGNOSIS — M25.441 FINGER JOINT SWELLING, RIGHT: ICD-10-CM

## 2024-08-05 LAB
CCP AB SER IA-ACNC: 2.1 U/ML
CRP SERPL-MCNC: 1.6 MG/L (ref 0–8.2)
ERYTHROCYTE [SEDIMENTATION RATE] IN BLOOD BY WESTERGREN METHOD: 31 MM/HR (ref 0–20)
RHEUMATOID FACT SERPL-ACNC: <13 IU/ML (ref 0–15)
URATE SERPL-MCNC: 3.6 MG/DL (ref 2.4–5.7)

## 2024-08-05 PROCEDURE — 86200 CCP ANTIBODY: CPT | Performed by: SURGERY

## 2024-08-05 PROCEDURE — 36415 COLL VENOUS BLD VENIPUNCTURE: CPT | Performed by: SURGERY

## 2024-08-05 PROCEDURE — 86140 C-REACTIVE PROTEIN: CPT | Performed by: SURGERY

## 2024-08-05 PROCEDURE — 86431 RHEUMATOID FACTOR QUANT: CPT | Performed by: SURGERY

## 2024-08-05 PROCEDURE — 86038 ANTINUCLEAR ANTIBODIES: CPT | Performed by: SURGERY

## 2024-08-05 PROCEDURE — 85651 RBC SED RATE NONAUTOMATED: CPT | Performed by: SURGERY

## 2024-08-05 PROCEDURE — 86039 ANTINUCLEAR ANTIBODIES (ANA): CPT | Performed by: SURGERY

## 2024-08-05 PROCEDURE — 86225 DNA ANTIBODY NATIVE: CPT | Performed by: SURGERY

## 2024-08-05 PROCEDURE — 84550 ASSAY OF BLOOD/URIC ACID: CPT | Performed by: SURGERY

## 2024-08-05 PROCEDURE — 86235 NUCLEAR ANTIGEN ANTIBODY: CPT | Mod: 59 | Performed by: SURGERY

## 2024-08-06 LAB
ANA PATTERN 1: NORMAL
ANA SER QL IF: POSITIVE
ANA TITR SER IF: NORMAL {TITER}

## 2024-08-07 ENCOUNTER — TELEPHONE (OUTPATIENT)
Dept: PHYSICAL MEDICINE AND REHAB | Facility: CLINIC | Age: 74
End: 2024-08-07
Payer: MEDICARE

## 2024-08-07 LAB
ANTI SM ANTIBODY: 0.12 RATIO (ref 0–0.99)
ANTI SM/RNP ANTIBODY: 0.14 RATIO (ref 0–0.99)
ANTI-SM INTERPRETATION: NEGATIVE
ANTI-SM/RNP INTERPRETATION: NEGATIVE
ANTI-SSA ANTIBODY: 0.33 RATIO (ref 0–0.99)
ANTI-SSA INTERPRETATION: NEGATIVE
ANTI-SSB ANTIBODY: 0.11 RATIO (ref 0–0.99)
ANTI-SSB INTERPRETATION: NEGATIVE
DSDNA AB SER-ACNC: NORMAL [IU]/ML

## 2024-08-22 DIAGNOSIS — F41.9 ANXIETY AND DEPRESSION: ICD-10-CM

## 2024-08-22 DIAGNOSIS — F41.1 GAD (GENERALIZED ANXIETY DISORDER): ICD-10-CM

## 2024-08-22 DIAGNOSIS — E78.5 TYPE 2 DIABETES MELLITUS WITH HYPERLIPIDEMIA: ICD-10-CM

## 2024-08-22 DIAGNOSIS — F32.A ANXIETY AND DEPRESSION: ICD-10-CM

## 2024-08-22 DIAGNOSIS — E11.69 TYPE 2 DIABETES MELLITUS WITH HYPERLIPIDEMIA: ICD-10-CM

## 2024-08-26 DIAGNOSIS — F41.9 ANXIETY AND DEPRESSION: ICD-10-CM

## 2024-08-26 DIAGNOSIS — F32.A ANXIETY AND DEPRESSION: ICD-10-CM

## 2024-08-26 RX ORDER — BUSPIRONE HYDROCHLORIDE 30 MG/1
30 TABLET ORAL 2 TIMES DAILY
Qty: 180 TABLET | Refills: 3 | Status: SHIPPED | OUTPATIENT
Start: 2024-08-26 | End: 2025-08-26

## 2024-08-26 RX ORDER — BUSPIRONE HYDROCHLORIDE 15 MG/1
15 TABLET ORAL 2 TIMES DAILY
Qty: 180 TABLET | Refills: 0 | OUTPATIENT
Start: 2024-08-26

## 2024-08-26 RX ORDER — EMPAGLIFLOZIN 25 MG/1
25 TABLET, FILM COATED ORAL
Qty: 30 TABLET | Refills: 11 | Status: SHIPPED | OUTPATIENT
Start: 2024-08-26

## 2024-09-17 DIAGNOSIS — F41.1 GAD (GENERALIZED ANXIETY DISORDER): ICD-10-CM

## 2024-09-17 RX ORDER — BUSPIRONE HYDROCHLORIDE 15 MG/1
15 TABLET ORAL 2 TIMES DAILY
Qty: 180 TABLET | Refills: 0 | OUTPATIENT
Start: 2024-09-17

## 2024-09-17 NOTE — TELEPHONE ENCOUNTER
----- Message from Andree Briceno sent at 9/17/2024 10:02 AM CDT -----  Type: Needs Medical Advice  Who Called:  pt  Symptoms (please be specific):    How long has patient had these symptoms:    Pharmacy name and phone #:    Best Call Back Number: [phone]    Additional Information: pt is asking for a call back because she needs a paper signed regarding her surgery   Please call to advise

## 2024-09-17 NOTE — TELEPHONE ENCOUNTER
Pt scheduled for procedure 9/26/24 clearance needed.  Pt scheduled to see NP 9/23/24, once appt complete NP will complete proper paperwork. Pt voiced understanding   Fair

## 2024-09-20 ENCOUNTER — LAB VISIT (OUTPATIENT)
Dept: LAB | Facility: HOSPITAL | Age: 74
End: 2024-09-20
Attending: NURSE PRACTITIONER
Payer: MEDICARE

## 2024-09-20 DIAGNOSIS — E11.69 TYPE 2 DIABETES MELLITUS WITH HYPERLIPIDEMIA: ICD-10-CM

## 2024-09-20 DIAGNOSIS — D50.8 IRON DEFICIENCY ANEMIA SECONDARY TO INADEQUATE DIETARY IRON INTAKE: ICD-10-CM

## 2024-09-20 DIAGNOSIS — E78.5 TYPE 2 DIABETES MELLITUS WITH HYPERLIPIDEMIA: ICD-10-CM

## 2024-09-20 LAB
ANION GAP SERPL CALC-SCNC: 13 MMOL/L (ref 8–16)
BUN SERPL-MCNC: 15 MG/DL (ref 8–23)
CALCIUM SERPL-MCNC: 9.9 MG/DL (ref 8.7–10.5)
CHLORIDE SERPL-SCNC: 101 MMOL/L (ref 95–110)
CO2 SERPL-SCNC: 25 MMOL/L (ref 23–29)
CREAT SERPL-MCNC: 1 MG/DL (ref 0.5–1.4)
EST. GFR  (NO RACE VARIABLE): 59.9 ML/MIN/1.73 M^2
ESTIMATED AVG GLUCOSE: 286 MG/DL (ref 68–131)
FERRITIN SERPL-MCNC: 10 NG/ML (ref 20–300)
GLUCOSE SERPL-MCNC: 186 MG/DL (ref 70–110)
HBA1C MFR BLD: 11.6 % (ref 4–5.6)
IRON SERPL-MCNC: 37 UG/DL (ref 30–160)
POTASSIUM SERPL-SCNC: 3.7 MMOL/L (ref 3.5–5.1)
SATURATED IRON: 6 % (ref 20–50)
SODIUM SERPL-SCNC: 139 MMOL/L (ref 136–145)
TOTAL IRON BINDING CAPACITY: 593 UG/DL (ref 250–450)
TRANSFERRIN SERPL-MCNC: 401 MG/DL (ref 200–375)

## 2024-09-20 PROCEDURE — 80048 BASIC METABOLIC PNL TOTAL CA: CPT | Performed by: NURSE PRACTITIONER

## 2024-09-20 PROCEDURE — 82728 ASSAY OF FERRITIN: CPT | Performed by: NURSE PRACTITIONER

## 2024-09-20 PROCEDURE — 83540 ASSAY OF IRON: CPT | Performed by: NURSE PRACTITIONER

## 2024-09-20 PROCEDURE — 36415 COLL VENOUS BLD VENIPUNCTURE: CPT | Performed by: NURSE PRACTITIONER

## 2024-09-20 PROCEDURE — 83036 HEMOGLOBIN GLYCOSYLATED A1C: CPT | Performed by: NURSE PRACTITIONER

## 2024-09-23 ENCOUNTER — PATIENT OUTREACH (OUTPATIENT)
Dept: ADMINISTRATIVE | Facility: HOSPITAL | Age: 74
End: 2024-09-23
Payer: MEDICARE

## 2024-09-23 ENCOUNTER — TELEPHONE (OUTPATIENT)
Dept: FAMILY MEDICINE | Facility: CLINIC | Age: 74
End: 2024-09-23
Payer: MEDICARE

## 2024-09-23 ENCOUNTER — OFFICE VISIT (OUTPATIENT)
Dept: FAMILY MEDICINE | Facility: CLINIC | Age: 74
End: 2024-09-23
Payer: MEDICARE

## 2024-09-23 VITALS
SYSTOLIC BLOOD PRESSURE: 130 MMHG | HEART RATE: 70 BPM | DIASTOLIC BLOOD PRESSURE: 68 MMHG | WEIGHT: 181 LBS | OXYGEN SATURATION: 96 % | HEIGHT: 62 IN | BODY MASS INDEX: 33.31 KG/M2

## 2024-09-23 DIAGNOSIS — E11.69 TYPE 2 DIABETES MELLITUS WITH HYPERLIPIDEMIA: ICD-10-CM

## 2024-09-23 DIAGNOSIS — F41.9 ANXIETY AND DEPRESSION: ICD-10-CM

## 2024-09-23 DIAGNOSIS — Z23 NEED FOR VACCINATION: ICD-10-CM

## 2024-09-23 DIAGNOSIS — Z01.818 PRE-OP EXAM: Primary | ICD-10-CM

## 2024-09-23 DIAGNOSIS — I15.2 HYPERTENSION ASSOCIATED WITH DIABETES: ICD-10-CM

## 2024-09-23 DIAGNOSIS — I10 ESSENTIAL HYPERTENSION: ICD-10-CM

## 2024-09-23 DIAGNOSIS — D50.8 IRON DEFICIENCY ANEMIA SECONDARY TO INADEQUATE DIETARY IRON INTAKE: ICD-10-CM

## 2024-09-23 DIAGNOSIS — F32.A ANXIETY AND DEPRESSION: ICD-10-CM

## 2024-09-23 DIAGNOSIS — E11.59 HYPERTENSION ASSOCIATED WITH DIABETES: ICD-10-CM

## 2024-09-23 DIAGNOSIS — M79.2 NERVE PAIN: ICD-10-CM

## 2024-09-23 DIAGNOSIS — E78.5 TYPE 2 DIABETES MELLITUS WITH HYPERLIPIDEMIA: ICD-10-CM

## 2024-09-23 PROCEDURE — 3075F SYST BP GE 130 - 139MM HG: CPT | Mod: CPTII,S$GLB,, | Performed by: NURSE PRACTITIONER

## 2024-09-23 PROCEDURE — 1160F RVW MEDS BY RX/DR IN RCRD: CPT | Mod: CPTII,S$GLB,, | Performed by: NURSE PRACTITIONER

## 2024-09-23 PROCEDURE — 3078F DIAST BP <80 MM HG: CPT | Mod: CPTII,S$GLB,, | Performed by: NURSE PRACTITIONER

## 2024-09-23 PROCEDURE — 3008F BODY MASS INDEX DOCD: CPT | Mod: CPTII,S$GLB,, | Performed by: NURSE PRACTITIONER

## 2024-09-23 PROCEDURE — 1125F AMNT PAIN NOTED PAIN PRSNT: CPT | Mod: CPTII,S$GLB,, | Performed by: NURSE PRACTITIONER

## 2024-09-23 PROCEDURE — 99215 OFFICE O/P EST HI 40 MIN: CPT | Mod: S$GLB,,, | Performed by: NURSE PRACTITIONER

## 2024-09-23 PROCEDURE — 1157F ADVNC CARE PLAN IN RCRD: CPT | Mod: CPTII,S$GLB,, | Performed by: NURSE PRACTITIONER

## 2024-09-23 PROCEDURE — 3046F HEMOGLOBIN A1C LEVEL >9.0%: CPT | Mod: CPTII,S$GLB,, | Performed by: NURSE PRACTITIONER

## 2024-09-23 PROCEDURE — 90653 IIV ADJUVANT VACCINE IM: CPT | Mod: S$GLB,,, | Performed by: NURSE PRACTITIONER

## 2024-09-23 PROCEDURE — 99999 PR PBB SHADOW E&M-EST. PATIENT-LVL V: CPT | Mod: PBBFAC,,, | Performed by: NURSE PRACTITIONER

## 2024-09-23 PROCEDURE — G0008 ADMIN INFLUENZA VIRUS VAC: HCPCS | Mod: S$GLB,,, | Performed by: NURSE PRACTITIONER

## 2024-09-23 PROCEDURE — 1159F MED LIST DOCD IN RCRD: CPT | Mod: CPTII,S$GLB,, | Performed by: NURSE PRACTITIONER

## 2024-09-23 RX ORDER — EPINEPHRINE 0.3 MG/.3ML
0.3 INJECTION SUBCUTANEOUS ONCE AS NEEDED
OUTPATIENT
Start: 2024-09-23

## 2024-09-23 RX ORDER — SODIUM CHLORIDE 9 MG/ML
INJECTION, SOLUTION INTRAVENOUS CONTINUOUS
OUTPATIENT
Start: 2024-09-23

## 2024-09-23 RX ORDER — DIPHENHYDRAMINE HYDROCHLORIDE 50 MG/ML
50 INJECTION INTRAMUSCULAR; INTRAVENOUS ONCE AS NEEDED
OUTPATIENT
Start: 2024-09-23

## 2024-09-23 NOTE — PROGRESS NOTES
Subjective:       Patient ID: Argelia Pyle is a 74 y.o. female.    Chief Complaint: surgery clearance  -  The pt is a 75 y/o female that presents for pre op clearance for right carpal tunnel release and cubital tunnel release scheduled for 9/26/24 with Dr. Gale at [facility] in [place].     9/20/24 labs discussed with A1c 11.6 up from 7.6 3 mo ago. Pt reports running out of Nicolasa 3 sensors and did not contact anyone, did received a text but did not know what to do with it and did not use hand held glucometer. Reports taking all meds except did not give herself insulin for 2 weeks which she was confronted and told, NP anticipates much longer than just 2 weeks.    Pt's daughter Ana is present. Discussed pt with known memory loss. Has a granddaughter that intermittently lives with her. NP has encouraged family to do a medi planner for pt in the past due to the amount of meds she takes and memory loss. Pt willing today to do medi pack system at [facility]. Discussed insulin and jardiance will have to be purchased at [facility].      Long discussion with the pt and daughter that pt is very high risk for post surgical complications d/t uncontrolled DM and NP feels it has been longer than 2 weeks w/o insulin for A1c to be so high. Daughter was begging for surgery d/t pt c/o pain all the time and nothing is helping. Explained, NP will let surgeon know but MD may cancel sx himself. Explained if she cannot take medication correctly or be compliant, how will she do post operatively. Daughter reassured NP, the family as a whole would come together to help pt post operatively. RCRI 6%.     [facility] [facility] Dr. Smalls, appt 9/25/24 in [place]. IVONNE discussed with pt no able to tolerate oral iron but agreeable to infusion.   -  Past Medical History:   Diagnosis Date    Anxiety     Arthritis     Chronic sinusitis     Diabetes mellitus     Hypertension     IVONNE (iron deficiency anemia)        Past Surgical History:    Procedure Laterality Date    BREAST BIOPSY      BREAST LUMPECTOMY Left     BUNIONECTOMY Bilateral     COLONOSCOPY      ESOPHAGOGASTRODUODENOSCOPY      ETHMOIDECTOMY Bilateral 2019    Procedure: ETHMOIDECTOMY;  Surgeon: Michael Grubbs MD;  Location: [facility] OR;  Service: ENT;  Laterality: Bilateral;  endoscopic anterior and posterior    FUNCTIONAL ENDOSCOPIC SINUS SURGERY (FESS) N/A 2019    Procedure: FESS (FUNCTIONAL ENDOSCOPIC SINUS SURGERY);  Surgeon: Michael Grubbs MD;  Location: [facility] OR;  Service: ENT;  Laterality: N/A;    FUNCTIONAL ENDOSCOPIC SINUS SURGERY (FESS) Bilateral 2019    Procedure: FESS (FUNCTIONAL ENDOSCOPIC SINUS SURGERY);  Surgeon: Michael Grubbs MD;  Location: [facility] OR;  Service: ENT;  Laterality: Bilateral;    MAXILLARY ANTROSTOMY Bilateral 2019    Procedure: MAXILLARY ANTROSTOMY;  Surgeon: Michael Grubbs MD;  Location: [facility] OR;  Service: ENT;  Laterality: Bilateral;    NASAL SEPTOPLASTY Bilateral 2019    Procedure: SEPTOPLASTY, NOSE;  Surgeon: Michael Grubbs MD;  Location: [facility] OR;  Service: ENT;  Laterality: Bilateral;    NASAL TURBINATE REDUCTION Bilateral 2019    Procedure: REDUCTION, NASAL TURBINATE;  Surgeon: Michael Grubbs MD;  Location: [facility] OR;  Service: ENT;  Laterality: Bilateral;    SINUS SURGERY      x4    TUBAL LIGATION      WRIST SURGERY Left 2016    [facility]-Dr. Smalls        Social History     Socioeconomic History    Marital status:    Tobacco Use    Smoking status: Former     Current packs/day: 0.00     Average packs/day: 0.8 packs/day for 25.0 years (18.8 ttl pk-yrs)     Types: Cigarettes     Start date: 1974     Quit date: 1999     Years since quittin.4    Smokeless tobacco: Never   Substance and Sexual Activity    Alcohol use: Yes     Comment: social    Drug use: Not Currently     Types: Other-see comments     Comment: tramadol as prescribed    Sexual activity: Not Currently     Birth  control/protection: See Surgical Hx       Family History   Problem Relation Name Age of Onset    Leukemia Son      Breast cancer Neg Hx         Review of patient's allergies indicates:   Allergen Reactions    Cefuroxime Rash    Ketorolac tromethamine Other (See Comments)     Other reaction(s): Burning    Moxifloxacin Other (See Comments)     Heart racing.    Codeine Itching    Famotidine-ca carb-mag hydrox Hives    Ketorolac Other (See Comments)    Prevacid [lansoprazole] Itching          Current Outpatient Medications:     albuterol (PROVENTIL/VENTOLIN HFA) 90 mcg/actuation inhaler, Inhale 2 puffs into the lungs every 4 (four) hours as needed for Wheezing or Shortness of Breath., Disp: 18 g, Rfl: 11    azelastine (ASTELIN) 137 mcg (0.1 %) nasal spray, 1 spray (137 mcg total) by Nasal route 2 (two) times daily as needed for Rhinitis., Disp: 30 mL, Rfl: 11    blood sugar diagnostic Strp, To check BG 3 times daily, to use with insurance preferred meter, Disp: 200 strip, Rfl: 11    blood-glucose meter kit, To check BG 3 times daily, to use with insurance preferred meter, Disp: 1 each, Rfl: 0    cetirizine (ZYRTEC) 10 MG tablet, Take 1 tablet (10 mg total) by mouth daily as needed for Allergies., Disp: 90 tablet, Rfl: 3    cyclobenzaprine (FLEXERIL) 5 MG tablet, Take 1 tablet (5 mg total) by mouth 3 (three) times daily as needed for Muscle spasms., Disp: 30 tablet, Rfl: 5    diclofenac sodium (VOLTAREN ARTHRITIS PAIN) 1 % Gel, Apply 2 g topically once daily. for 10 days, Disp: 100 g, Rfl: 0    fluticasone propionate (FLONASE) 50 mcg/actuation nasal spray, 2 sprays (100 mcg total) by Each Nostril route daily as needed for Rhinitis., Disp: 15.8 mL, Rfl: 11    ibuprofen (ADVIL,MOTRIN) 800 MG tablet, Take 1 tablet (800 mg total) by mouth 2 (two) times daily as needed for Pain., Disp: 60 tablet, Rfl: 5    insulin glargine U-100, Lantus, (LANTUS SOLOSTAR U-100 INSULIN) 100 unit/mL (3 mL) InPn pen, Inject 20 Units into the  "skin every evening., Disp: 6 mL, Rfl: 5    lancets Misc, To check BG 3 times daily, to use with insurance preferred meter, Disp: 200 each, Rfl: 11    pen needle, diabetic (BD GIUSEPPE 2ND GEN PEN NEEDLE) 32 gauge x 5/32" Ndle, 1 Device by Misc.(Non-Drug; Combo Route) route nightly., Disp: 100 each, Rfl: 11    traZODone (DESYREL) 50 MG tablet, Take 1 tablet (50 mg total) by mouth nightly as needed for Insomnia., Disp: 90 tablet, Rfl: 3    amLODIPine (NORVASC) 10 MG tablet, Take 1 tablet (10 mg total) by mouth every evening., Disp: 90 tablet, Rfl: 3    aspirin (ECOTRIN) 81 MG EC tablet, Take 1 tablet (81 mg total) by mouth every evening., Disp: 90 tablet, Rfl: 3    busPIRone (BUSPAR) 30 MG Tab, Take 1 tablet (30 mg total) by mouth 2 (two) times daily., Disp: 180 tablet, Rfl: 3    empagliflozin (JARDIANCE) 25 mg tablet, Take 1 tablet (25 mg total) by mouth every morning., Disp: 30 tablet, Rfl: 11    glipiZIDE (GLUCOTROL) 10 MG tablet, Take 1 tablet (10 mg total) by mouth 2 (two) times daily before meals., Disp: 180 tablet, Rfl: 3    losartan-hydrochlorothiazide 100-25 mg (HYZAAR) 100-25 mg per tablet, Take 1 tablet by mouth every morning., Disp: 90 tablet, Rfl: 3    metFORMIN (GLUCOPHAGE) 1000 MG tablet, Take 1 tablet (1,000 mg total) by mouth 2 (two) times daily., Disp: 60 tablet, Rfl: 11    metoprolol tartrate (LOPRESSOR) 50 MG tablet, Take 1 tablet (50 mg total) by mouth 2 (two) times daily., Disp: , Rfl:     pantoprazole (PROTONIX) 20 MG tablet, Take 1 tablet (20 mg total) by mouth every morning., Disp: 90 tablet, Rfl: 3    paroxetine (PAXIL) 20 MG tablet, Take 1 tablet (20 mg total) by mouth every evening., Disp: 90 tablet, Rfl: 3    pregabalin (LYRICA) 75 MG capsule, Take 1 capsule (75 mg total) by mouth 2 (two) times daily., Disp: 180 capsule, Rfl: 1    simvastatin (ZOCOR) 20 MG tablet, Take 1 tablet (20 mg total) by mouth nightly., Disp: 90 tablet, Rfl: 3    HPI  Review of Systems   Constitutional: Negative.  "   HENT: Negative.     Eyes: Negative.    Respiratory: Negative.     Cardiovascular: Negative.    Gastrointestinal: Negative.    Endocrine: Negative.    Genitourinary: Negative.    Musculoskeletal:         Right hand pain   Skin: Negative.    Allergic/Immunologic: Negative.    Neurological: Negative.    Hematological: Negative.    Psychiatric/Behavioral: Negative.         Objective:      Physical Exam  Vitals and nursing note reviewed. Exam conducted with a chaperone present (daughter Ana).   Constitutional:       General: She is not in acute distress.     Appearance: Normal appearance. She is well-developed. She is obese. She is not ill-appearing.   HENT:      Head: Normocephalic.   Eyes:      Conjunctiva/sclera: Conjunctivae normal.   Neck:      Thyroid: No thyromegaly.   Cardiovascular:      Rate and Rhythm: Normal rate and regular rhythm.      Heart sounds: Normal heart sounds. No murmur heard.  Pulmonary:      Effort: Pulmonary effort is normal.      Breath sounds: Normal breath sounds. No wheezing or rales.   Musculoskeletal:         General: Normal range of motion.      Cervical back: Normal range of motion.      Right lower leg: No edema.      Left lower leg: No edema.   Skin:     General: Skin is warm and dry.   Neurological:      Mental Status: She is alert and oriented to person, place, and time. Mental status is at baseline.   Psychiatric:         Mood and Affect: Mood normal.         Behavior: Behavior normal.         Thought Content: Thought content normal.         Judgment: Judgment normal.         Assessment:       1. Pre-op exam    2. Hypertension associated with diabetes    3. Need for vaccination    4. Type 2 diabetes mellitus with hyperlipidemia    5. Nerve pain    6. Anxiety and depression    7. Essential hypertension    8. Iron deficiency anemia secondary to inadequate dietary iron intake        Plan:     1-NF lab 12/20  2- cancel 10/7/24 appt  3- RTC 6 weeks  4- will start GLP-1 if sx  canceled.  5- NP to coordinate medi pack with pharmacy. Will deliver to granddaughter Kyrinne in [place] and she will take to pt.  6- Please call [phone] to self schedule follow up with Dietician Arlen (10/3/24 appt).  7- CGM supplies re-ordered via pt phone  8- NP to call surgeon's office to discuss A1c.---called 9/2424 talked to Matilde which reassured will notify surgeon and anesthesia. NP will clear pt if MD okay with A1c and understanding high risk pt for low risk procedure. Anticipate insulin may be needed pre-op.   -   ---------   NP request surgeon's and anesthesia's response prior to giving clearance.  -     Pre-op exam    Hypertension associated with diabetes  -     Hemoglobin A1C; Future; Expected date: 12/20/2024  -     CBC Auto Differential; Future; Expected date: 12/20/2024  -     Comprehensive Metabolic Panel; Future; Expected date: 12/20/2024    Need for vaccination  -     Influenza - Trivalent (Adjuvanted)    Type 2 diabetes mellitus with hyperlipidemia  -     simvastatin (ZOCOR) 20 MG tablet; Take 1 tablet (20 mg total) by mouth nightly.  Dispense: 90 tablet; Refill: 3  -     pregabalin (LYRICA) 75 MG capsule; Take 1 capsule (75 mg total) by mouth 2 (two) times daily.  Dispense: 180 capsule; Refill: 1  -     glipiZIDE (GLUCOTROL) 10 MG tablet; Take 1 tablet (10 mg total) by mouth 2 (two) times daily before meals.  Dispense: 180 tablet; Refill: 3  -     empagliflozin (JARDIANCE) 25 mg tablet; Take 1 tablet (25 mg total) by mouth every morning.  Dispense: 30 tablet; Refill: 11    Nerve pain  -     pregabalin (LYRICA) 75 MG capsule; Take 1 capsule (75 mg total) by mouth 2 (two) times daily.  Dispense: 180 capsule; Refill: 1    Anxiety and depression  -     paroxetine (PAXIL) 20 MG tablet; Take 1 tablet (20 mg total) by mouth every evening.  Dispense: 90 tablet; Refill: 3  -     busPIRone (BUSPAR) 30 MG Tab; Take 1 tablet (30 mg total) by mouth 2 (two) times daily.  Dispense: 180 tablet; Refill:  3    Essential hypertension  -     metoprolol tartrate (LOPRESSOR) 50 MG tablet; Take 1 tablet (50 mg total) by mouth 2 (two) times daily.  -     losartan-hydrochlorothiazide 100-25 mg (HYZAAR) 100-25 mg per tablet; Take 1 tablet by mouth every morning.  Dispense: 90 tablet; Refill: 3  -     amLODIPine (NORVASC) 10 MG tablet; Take 1 tablet (10 mg total) by mouth every evening.  Dispense: 90 tablet; Refill: 3    Iron deficiency anemia secondary to inadequate dietary iron intake    Other orders  -     ferric carboxymaltose (INJECTAFER) 750 mg in sodium chloride 0.9% 265 mL IVPB (ready to mix)  -     0.9%  NaCl infusion  -     EPINEPHrine (EPIPEN) 0.3 mg/0.3 mL pen injection 0.3 mg  -     diphenhydrAMINE injection 50 mg  -     hydrocortisone sodium succinate injection 100 mg  -     pantoprazole (PROTONIX) 20 MG tablet; Take 1 tablet (20 mg total) by mouth every morning.  Dispense: 90 tablet; Refill: 3  -     metFORMIN (GLUCOPHAGE) 1000 MG tablet; Take 1 tablet (1,000 mg total) by mouth 2 (two) times daily.  Dispense: 60 tablet; Refill: 11  -     aspirin (ECOTRIN) 81 MG EC tablet; Take 1 tablet (81 mg total) by mouth every evening.  Dispense: 90 tablet; Refill: 3        Risks, benefits, and side effects were discussed with the patient. All questions were answered to the fullest satisfaction of the patient, and pt verbalized understanding and agreement to treatment plan. Pt was to call with any new or worsening symptoms, or present to the ER.

## 2024-09-23 NOTE — TELEPHONE ENCOUNTER
----- Message from Esme Kamara sent at 9/23/2024  9:33 AM CDT -----  Regarding: sx clearance status  Contact: Shania from [facility]  Type:  Needs Medical Advice    Who Called: Shania bo [facility]    Best Call Back Number: Shania [facility]  [phone]    Additional Information: calling re: status of sx clearance

## 2024-09-23 NOTE — LETTER
"       AUTHORIZATION FOR RELEASE OF   CONFIDENTIAL INFORMATION    Dear [facility],    We are seeing Argelia Pyle, date of birth 1950, in the clinic at [facility] 2ND FLOOR FAMILY MEDICINE. Sonya Blackman NP is the patient's PCP. Argelia Pyle has an outstanding lab/procedure at the time we reviewed her chart. In order to help keep her health information updated, she has authorized us to request the following medical record(s):                                                                                        ( X )  EYE EXAM                  Please fax records to [facility], [place]-Karamat, Kimberly A., NP, [phone] OR [phone].        Thank you  Maryam Zimmerman LPN  Clinical Care Coordinator  [facility]             Patient Name: Argelia Pyle  : 1950  Patient Phone #: [phone]                    A. Consent for Examination and Treatment: I hereby authorize the providers and employees of [facility] ("[facility] to provide medical treatment/services which includes, but is not limited to, performing and administering tests and diagnostic procedures that are deemed necessary, Including, but not limited to, imaging examinations, blood tests and other laboratory procedures as may be required by the hospital, clinic, or may be ordered by my physician(s) or persons working under the general and/or special instructions of my physician(s).                   1.      I understand and agree that this consent covers all authorized persons, including but not limited to physicians, residents, nurse practitioners, physicians' assistants, specialists, consultants, student nurses, and independently contracted physicians, who are called upon by the physician in charge, to carry out the diagnostic procedures and medical or surgical treatment.  2.      I hereby authorize [facility] to retain or dispose of any specimens or tissue, should there be such remaining " from any test or procedure.  3.      I hereby authorize and give consent for [facility] providers and employees to take photographs, images or videotapes of such diagnostic, surgical or treatment procedures of Patient as may be required by [facility] or as may be ordered by a physician.  I further acknowledge and agree that [facility] may use cameras or other devices for patient monitoring.  4.      I am aware that the practice of medicine is not an exact science, and I acknowledge that no guarantees have been made to me as to the outcome of any tests, procedures or treatment.                   B. Authorization for Release of Information:  I understand that my insurance company and/or their agents may need information necessary to make determinations about payment/reimbursement.  I hereby provide authorization to release to all insurance companies, their successors, assignees, other parties with whom they may have contracted, or others acting on their behalf, that are involved with payment for any hospital and/or clinic charges incurred by the patient, any information that they request and deem necessary for payment/reimbursement, and/or quality review.  I further authorize the release of my health information to physicians or other health care practitioners on staff who are involved in my health care now and in the future, and to other health care providers, entities, or institutions for the purpose of my continued care and treatment, including referrals.     C. Medicare Patient's Certification and Authorization to Release Information and Payment Request:  I certify that the information given by me in applying for payment under Title XVIII of the Social Security Act is correct.  I authorize any shipley of medical or other information about me to release to the Social Security Administration, or its intermediaries or carriers, any information needed for this or a related Medicare claim.  I request that payment of authorized  benefits be made on my behalf.     D. Assignment of Insurance Benefits:  I hereby authorize any and all insurance companies, health plans, defined benefit plans, health insurers or any entity that is or may be responsible for payment of my medical expenses to pay all hospital and medical benefits now due, and to become due and payable to me under any hospital benefits, sick benefits, injury benefits or any other benefit for services rendered to me, including Major Medical Benefits, direct to [facility] and all independently contracted physicians.  I assign any and all rights that I may have against any and all insurance companies, health plans, defined benefit plans, health insurers or any entity that is or may be responsible for payment of my medical expenses, including, but not limited to any right to appeal a denial of a claim, any right to bring any action, lawsuit, administrative proceeding, or other cause of action on my behalf.  I specifically assign my right to pursue litigation against any and all insurance companies, health plans, defined benefit plans, health insurers or any entity that is or may be responsible for payment of medical expenses based upon a refusal to pay charges.              REGISTRATION  AUTHORIZATION                    E. Valuables:  It is understood and agreed that [facility] is not liable for the damage to or loss of any money, jewelry, documents, dentures, eye glasses, hearing aids, prosthetics, or other property of value.     F. Computer Equipment:  I understand and agree that should I choose to use computer equipment owned by [facility] or if I choose to access the Internet via [facility]'s network, I do so at my own risk.  [facility] is not responsible for any damage to my computer equipment or to any damages of any type that might arise from my loss of equipment or data.                   G. Acceptance of Financial Responsibility:  I agree that in consideration of the services and supplies that  have been or will be furnished to the patient,  I am hereby obligated to pay all charges made for or on the account of the patient according to the standard rates (in effect at the time the services and supplies are delivered) established by [facility], including its Patient Financial Assistance Policy to the extent it is applicable.  I understand that I am responsible for all charges, or portions thereof, not covered by insurance or other sources.  Patient refunds will be distributed only after balances at all [facility] facilities are paid.                   H. Communication Authorization:  I hereby authorize [facility] and its representatives, along with any billing service or  who may work on their behalf, to contact me on my cell phone and/or home phone using prerecorded messages, artificial voice messages, automatic telephone dialing devices or other computer assisted technology, or by electronic mail, text messaging, or by any other form of electronic communication.  This includes, but is not limited to appointment reminders, yearly physical exam reminders, preventive care reminders, patient campaigns, welcome calls, and calls about account balances on my account or any account on which I am listed as a guarantor.  I understand I have the right to opt out of these communications at any time.     I.  Relationship Between Facility and Physician:  I understand that some, but not all, providers furnishing services to the patient are not employees or agents of [facility].  The patient is under the care and supervision of his/her attending physician, and it is the responsibility of the facility and its nursing staff to carry out the instructions of such physicians.  It is the responsibility of the patient's physician/designee to obtain the patient's informed consent, when required, for medical or surgical treatment, special diagnostic or therapeutic procedures, or hospital services rendered for the patient  under the special instructions of the physician/designee.     J. Notice of Private Practices:  I acknowledge I have received a copy of [facility]'s Notice of Privacy Practices.     K. Facility Directory:  I have discussed with the organization my desire to be either included or excluded in the facility directory.  I understand that if my choice is to opt-out of being identified in the facility directory that the facility will not provide any information about me such as my condition (e.g. Fair, stable, etc.) or my location in the facility (e.g. Room number, department).     L. LINKS:  For [place] Residents:  [facility] is a LINKS ([place] Immunization Network for Kids Statewide) participating facility.  LINKS is a [facility]-sponsored confidential computer system that helps you and your doctor keep track of you and your child's immunization history.  I acknowledge that I am allowing [facility] to share this information with [facility].  For [place] Residents:  [facility] is a MIIX ([place] Immunization Information eXchange) participant.  [employer] is a [place] Department of Health-sponsored confidential computer system that helps you and your doctor keep track of you and your child's immunization history.  I acknowledge that I am allowing [facility] to share information with [facility].     M. Term:  This authorization is valid for this and subsequent care/treatment I receive at [facility] and will remain valid unless/until revoked in writing by me.      ACKNOWLEDGMENT OF POTENTIAL RISKS OF COVID-19 VACCINE  It is important that you, as the patient or patients legally authorized representative(s), understand and acknowledge the following, with regard to administration of the COVID-19 vaccine offered by [facility]:  The SARS-CoV-2 virus (COVID-19) has caused an unprecedented modern global pandemic that has mobilized scientists and drug manufacturers to work to create safe and effective vaccines to get the crisis under control.  No  vaccine is released in the [place] without undergoing rigorous, multi-layered testing and approval by the Food and Drug Administration.  During a public health emergency, however, vaccines can be released for patient administration by the FDA prior to completion of multi-phase clinical trials and approval. This is done by the FDAs granting of Emergency Use Authorization (EUA) when the vaccine meets reasonable thresholds for safety and effectiveness and people are in urgent need of care. Under an EUA, the FDA has found that known and potential benefits outweigh its known and potential risks.  The vaccine for which you are presenting to [facility] has been released under an EUA, which [facility] is honoring in its distribution of the vaccine to the public. While the FDAs authorization indicates its belief that usage is recommended over possible risks, there is still the possibility that unknown risks of the vaccine could exist.  By signing this document, you acknowledge and assume these risks. Further, you waive any and all claims of liability against and hold harmless any [facility] entity or provider for any harm caused to you by said possible unknown risks of the vaccine.        N. [facility]:  As used in this document, [facility] means all [facility] affiliated entities including all health centers, surgery centers, clinics, and hospitals.  It includes more specifically, the following entities: [facility], a not for profit [facility], and its subsidiaries and affiliates, including [facility], [facility], [facility], [facility], [facility]., [facility] L.LDianeC.d/b/a [facility], L.L.CDianed/b/a [facility] [employer]der[employer] Operational Management  PONCHO Kenyon As manager of [facility], [facility], [facility] [employer], L.L.C.d/b/a [facility], [facility] and [facility] as manager of [facility].                                                                Patient/Legal Guardian Signature                                                    This signature was collected at 06/06/2024      Ivana Pyle/Self                                                                            Printed Name/Relationship to Patient

## 2024-09-23 NOTE — PROGRESS NOTES
Population Health Chart Review & Patient Outreach Details      Additional [facility] Health Notes:               Updates Requested / Reviewed:      Updated Care Coordination Note, Care Everywhere, , Care Team Updated, and Immunizations Reconciliation Completed or Queried: [facility] Topics Overdue:      [facility] Score: 1     Eye Exam    Influenza Vaccine  Tetanus Vaccine  Shingles/Zoster Vaccine  RSV Vaccine                  Health Maintenance Topic(s) Outreach Outcomes & Actions Taken:    Eye Exam - Outreach Outcomes & Actions Taken  : External Records Requested & Care Team Updated if Applicable

## 2024-09-24 RX ORDER — ASPIRIN 81 MG/1
81 TABLET ORAL NIGHTLY
Qty: 90 TABLET | Refills: 3 | Status: SHIPPED | OUTPATIENT
Start: 2024-09-24

## 2024-09-24 RX ORDER — PANTOPRAZOLE SODIUM 20 MG/1
20 TABLET, DELAYED RELEASE ORAL EVERY MORNING
Qty: 90 TABLET | Refills: 3 | Status: SHIPPED | OUTPATIENT
Start: 2024-09-24

## 2024-09-24 RX ORDER — METOPROLOL TARTRATE 50 MG/1
50 TABLET ORAL 2 TIMES DAILY
Start: 2024-09-24

## 2024-09-24 RX ORDER — METFORMIN HYDROCHLORIDE 1000 MG/1
1000 TABLET ORAL 2 TIMES DAILY
Qty: 60 TABLET | Refills: 11 | Status: SHIPPED | OUTPATIENT
Start: 2024-09-24

## 2024-09-24 RX ORDER — AMLODIPINE BESYLATE 10 MG/1
10 TABLET ORAL NIGHTLY
Qty: 90 TABLET | Refills: 3 | Status: SHIPPED | OUTPATIENT
Start: 2024-09-24

## 2024-09-24 RX ORDER — BUSPIRONE HYDROCHLORIDE 30 MG/1
30 TABLET ORAL 2 TIMES DAILY
Qty: 180 TABLET | Refills: 3 | Status: SHIPPED | OUTPATIENT
Start: 2024-09-24 | End: 2025-09-24

## 2024-09-24 RX ORDER — LOSARTAN POTASSIUM AND HYDROCHLOROTHIAZIDE 25; 100 MG/1; MG/1
1 TABLET ORAL EVERY MORNING
Qty: 90 TABLET | Refills: 3 | Status: SHIPPED | OUTPATIENT
Start: 2024-09-24

## 2024-09-24 RX ORDER — GLIPIZIDE 10 MG/1
10 TABLET ORAL
Qty: 180 TABLET | Refills: 3 | Status: SHIPPED | OUTPATIENT
Start: 2024-09-24

## 2024-09-24 RX ORDER — PAROXETINE HYDROCHLORIDE 20 MG/1
20 TABLET, FILM COATED ORAL NIGHTLY
Qty: 90 TABLET | Refills: 3 | Status: SHIPPED | OUTPATIENT
Start: 2024-09-24 | End: 2025-09-24

## 2024-09-24 RX ORDER — PREGABALIN 75 MG/1
75 CAPSULE ORAL 2 TIMES DAILY
Qty: 180 CAPSULE | Refills: 1 | Status: SHIPPED | OUTPATIENT
Start: 2024-09-24

## 2024-09-24 RX ORDER — SIMVASTATIN 20 MG/1
20 TABLET, FILM COATED ORAL NIGHTLY
Qty: 90 TABLET | Refills: 3 | Status: SHIPPED | OUTPATIENT
Start: 2024-09-24

## 2024-09-25 DIAGNOSIS — E11.69 TYPE 2 DIABETES MELLITUS WITH HYPERLIPIDEMIA: ICD-10-CM

## 2024-09-25 DIAGNOSIS — E78.5 TYPE 2 DIABETES MELLITUS WITH HYPERLIPIDEMIA: ICD-10-CM

## 2024-09-25 LAB
LEFT EYE DM RETINOPATHY: NEGATIVE
RIGHT EYE DM RETINOPATHY: NEGATIVE

## 2024-09-25 NOTE — TELEPHONE ENCOUNTER
----- Message from Dana Atwood sent at 9/25/2024  2:46 PM CDT -----  Type: Needs Medical Advice  Who Called:  Nany with [facility]  Best Call Back Number: [phone]  Additional Information: Nany is calling in regards to needing to get a Rx for the pt's empagliflozin (JARDIANCE) 25 mg tablet so that she can put the unit dose on it for cash pay so that it can be packed for the pt to have picked up today. Needs it sent ASAP. Please call back and advise. Thanks!

## 2024-09-25 NOTE — TELEPHONE ENCOUNTER
Returned call to Niyah gallardo/ [facility].   Pt is on Med pack   [facility] needs Rx to run under insurance, 9/24/24 sent  [facility] need RX to run under cash, for unit dose as they  the script from [facility] for pt  Please send RX to [facility]

## 2024-09-27 ENCOUNTER — TELEPHONE (OUTPATIENT)
Dept: FAMILY MEDICINE | Facility: CLINIC | Age: 74
End: 2024-09-27
Payer: MEDICARE

## 2024-09-27 DIAGNOSIS — F51.01 PRIMARY INSOMNIA: ICD-10-CM

## 2024-09-27 RX ORDER — MONTELUKAST SODIUM 10 MG/1
10 TABLET ORAL NIGHTLY
COMMUNITY

## 2024-09-27 RX ORDER — TRAZODONE HYDROCHLORIDE 50 MG/1
50 TABLET ORAL NIGHTLY
Start: 2024-09-27 | End: 2025-09-27

## 2024-10-02 ENCOUNTER — TELEPHONE (OUTPATIENT)
Dept: DIABETES | Facility: CLINIC | Age: 74
End: 2024-10-02
Payer: MEDICARE

## 2024-10-02 ENCOUNTER — TELEPHONE (OUTPATIENT)
Dept: FAMILY MEDICINE | Facility: CLINIC | Age: 74
End: 2024-10-02
Payer: MEDICARE

## 2024-10-03 ENCOUNTER — NUTRITION (OUTPATIENT)
Dept: DIABETES | Facility: CLINIC | Age: 74
End: 2024-10-03
Payer: MEDICARE

## 2024-10-03 VITALS — BODY MASS INDEX: 33.53 KG/M2 | WEIGHT: 182.19 LBS | HEIGHT: 62 IN

## 2024-10-03 DIAGNOSIS — E11.69 TYPE 2 DIABETES MELLITUS WITH HYPERLIPIDEMIA: Primary | ICD-10-CM

## 2024-10-03 DIAGNOSIS — E78.5 TYPE 2 DIABETES MELLITUS WITH HYPERLIPIDEMIA: Primary | ICD-10-CM

## 2024-10-03 NOTE — PROGRESS NOTES
"Diabetes Care Specialist Follow-up Note  Author: Arlen Pedraza RD  Date: 10/3/2024    Intake    Program Intake  Reason for Diabetes Program Visit:: Intervention  Type of Intervention:: Individual  Individual: Education  Education: Self-Management Skill Review, Nutrition and Meal Planning  Current diabetes risk level:: high    Current Diabetes Treatment: Oral Medications, Insulin  Oral Medication Type/Dose: Jardiance 25 mg daily, Glipizide 10 mg twice daily before meals, Metformin 1,000 mg twice daily (PCP recently ordered meds through a Medi pack system and patient reports it has been most helpful)  Method of insulin delivery?: Injections  Injection Type: Pens  Pen Type/Dose: Lantus 10 units here lately per patient (order states to take 20 units daily)    Continuous Glucose Monitoring  Personal CGM type:: Freestyle Nicolasa 3  GMI Value:  (Does not have reader today, current sensor on arm was apparently not activated with the reader when it was first put on)    Lab Results   Component Value Date    HGBA1C 11.6 (H) 09/20/2024     A1c Pre Diabetes Care Specialist Intervention:  7.6%      Weight: 82.6 kg (182 lb 3.2 oz)   Height: 5' 2" (157.5 cm)   Body mass index is 33.32 kg/m².  Wt loss of almost 3 lbs since last visit on 7/25/2024      Physical activity/Exercise:   Active with her grandchildren and great grandchildren     SMBG:   See care plan below.        Diabetes Self-Management Skills Assessment      Nutrition/Healthy Eating  Meal Plan 24 Hour Recall - Breakfast: 1 slice of raisin bread (dry), coffee with plain creamer and regular sugar, cup of milk  Meal Plan 24 Hour Recall - Lunch: 1 soft taco  Meal Plan 24 Hour Recall - Dinner: beef hot dog with condiments  Meal Plan 24 Hour Recall - Snack: cantelope, grapes, peanut butter crackers, 2 chocolate chips ahoy  Meal Plan 24 Hour Recall - Beverage: coffee, unsweet tea, water, milk, chocolate milk         Home Blood Glucose Monitoring  Personal CGM type:: Freestyle Nicolasa " 3         Today's interventions were provided through individual discussion, instruction, and written materials were provided.    Patient verbalized understanding of instruction and written materials.  Pt was able to return back demonstration of instructions today. Patient understood key points, needs reinforcement and further instruction.     Diabetes Self-Management Care Plan Review and Evaluation of Progress:    During today's follow-up Isabel Diabetes Self-Management Care Plan progress was reviewed and progress was evaluated including his/her input. Argelia has agreed to continue his/her journey to improve/maintain overall diabetes control by continuing to set health goals. See care plan progress below.      Care Plan: Diabetes Management   Updates made since 9/3/2024 12:00 AM        Problem: Healthy Eating         Goal: Eat 3 meals daily 2-3 servings of carb per meal. Add protein/healthy fat source to carb snacks. Reduce overall sugar intake.    Start Date: 4/2/2024   Expected End Date: 11/14/2024   Recent Progress: On track   Priority: High   Barriers: No Barriers Identified   Note:    10/3/2024- Patient reports staying with daughter right now so has been having regular sugar in her coffee. Encouraged patient to get some sugar sub. Also made several other recommendations to decrease overall simple sugar intake such as sub fruit (with added protein) or light Greek yogurt for snack when wanting cookies or to choose sugar free option. Also encouraged sugar free chocolate syrup to make her chocolate milk. Patient and granddaughter present today mentioned patient drinking Ensure perhaps. Discussed that having a protein shake is not a bad idea when patient does not have much of an appetite however would recommend the lower sugar variety of protein shake such as Splenda shake, Glucerna or Boost Glucose control. Wrote out all these recommendations along with note to take full 20 unit dose of Lantus daily. Also  wrote follow up on the paper as well. Encouraged patient to hang on her fridge.        Problem: Blood Glucose Self-Monitoring         Goal: Patient agrees to check blood sugar using a continous glucose monitor.    Start Date: 4/2/2024   Expected End Date: 10/24/2024   Recent Progress: Met   Priority: Medium   Barriers: No Barriers Identified   Note:    10/3/2024- Patient does not have her reader with her today. Made a note on previous paper mentioned to bring reader to follow up. Instructed patient to remove current sensor she is wearing and get started with a new one once she has her reader with her again. Provided her with a sample Nicolasa 3 so she is not to run of them. States she tested a few times when she realized she was not getting readings from her reader but does not recall what the numbers were at this time.          Follow Up Plan     Follow up in 6 weeks (on 11/14/2024) for Nicolasa 3 reader.    Today's care plan and follow up schedule was discussed with patient.  Argelia verbalized understanding of the care plan, goals, and agrees to follow up plan.        The patient was encouraged to communicate with his/her health care provider/physician and care team regarding his/her condition(s) and treatment.  I provided the patient with my contact information today and encouraged to contact me via phone or [facility] Portal as needed.     Length of Visit   Total Time: 45 Minutes

## 2024-10-03 NOTE — TELEPHONE ENCOUNTER
Please call either Ana or granddaughter as NP would like to know if patient had surgery? Thanks, Blanca

## 2024-10-03 NOTE — TELEPHONE ENCOUNTER
Called patient's granddaughter per provider request to find out if patient had surgery. Was not able to reach her.

## 2024-10-06 ENCOUNTER — TELEPHONE (OUTPATIENT)
Dept: FAMILY MEDICINE | Facility: CLINIC | Age: 74
End: 2024-10-06
Payer: MEDICARE

## 2024-10-06 RX ORDER — DIPHENHYDRAMINE HYDROCHLORIDE 50 MG/ML
50 INJECTION INTRAMUSCULAR; INTRAVENOUS ONCE AS NEEDED
Status: CANCELLED | OUTPATIENT
Start: 2024-10-06

## 2024-10-06 RX ORDER — EPINEPHRINE 0.3 MG/.3ML
0.3 INJECTION SUBCUTANEOUS ONCE AS NEEDED
Status: CANCELLED | OUTPATIENT
Start: 2024-10-06

## 2024-10-09 ENCOUNTER — TELEPHONE (OUTPATIENT)
Dept: FAMILY MEDICINE | Facility: CLINIC | Age: 74
End: 2024-10-09
Payer: MEDICARE

## 2024-10-09 RX ORDER — DIPHENHYDRAMINE HYDROCHLORIDE 50 MG/ML
50 INJECTION INTRAMUSCULAR; INTRAVENOUS ONCE AS NEEDED
OUTPATIENT
Start: 2024-10-09

## 2024-10-09 RX ORDER — SODIUM CHLORIDE 9 MG/ML
INJECTION, SOLUTION INTRAVENOUS CONTINUOUS
OUTPATIENT
Start: 2024-10-09

## 2024-10-09 RX ORDER — EPINEPHRINE 0.3 MG/.3ML
0.3 INJECTION SUBCUTANEOUS ONCE AS NEEDED
OUTPATIENT
Start: 2024-10-09

## 2024-10-14 ENCOUNTER — PATIENT OUTREACH (OUTPATIENT)
Dept: ADMINISTRATIVE | Facility: HOSPITAL | Age: 74
End: 2024-10-14
Payer: MEDICARE

## 2024-10-14 ENCOUNTER — HOSPITAL ENCOUNTER (EMERGENCY)
Facility: HOSPITAL | Age: 74
Discharge: HOME | End: 2024-10-14
Attending: EMERGENCY MEDICINE
Payer: MEDICARE

## 2024-10-14 VITALS
DIASTOLIC BLOOD PRESSURE: 63 MMHG | HEART RATE: 78 BPM | HEIGHT: 62 IN | WEIGHT: 180 LBS | BODY MASS INDEX: 33.13 KG/M2 | OXYGEN SATURATION: 95 % | RESPIRATION RATE: 16 BRPM | TEMPERATURE: 99 F | SYSTOLIC BLOOD PRESSURE: 132 MMHG

## 2024-10-14 DIAGNOSIS — J06.9 VIRAL URI WITH COUGH: Primary | ICD-10-CM

## 2024-10-14 DIAGNOSIS — R05.9 COUGH: ICD-10-CM

## 2024-10-14 LAB
GROUP A STREP, MOLECULAR: NEGATIVE
INFLUENZA A, MOLECULAR: NEGATIVE
INFLUENZA B, MOLECULAR: NEGATIVE
SARS-COV-2 RDRP RESP QL NAA+PROBE: NEGATIVE
SPECIMEN SOURCE: NORMAL

## 2024-10-14 PROCEDURE — 71046 X-RAY EXAM CHEST 2 VIEWS: CPT | Mod: TC

## 2024-10-14 PROCEDURE — 87651 STREP A DNA AMP PROBE: CPT | Performed by: NURSE PRACTITIONER

## 2024-10-14 PROCEDURE — 71046 X-RAY EXAM CHEST 2 VIEWS: CPT | Mod: 26,,, | Performed by: RADIOLOGY

## 2024-10-14 PROCEDURE — 99283 EMERGENCY DEPT VISIT LOW MDM: CPT | Mod: 25

## 2024-10-14 PROCEDURE — U0002 COVID-19 LAB TEST NON-CDC: HCPCS | Performed by: NURSE PRACTITIONER

## 2024-10-14 PROCEDURE — 87502 INFLUENZA DNA AMP PROBE: CPT | Performed by: NURSE PRACTITIONER

## 2024-10-14 RX ORDER — BENZONATATE 200 MG/1
200 CAPSULE ORAL 3 TIMES DAILY PRN
Qty: 60 CAPSULE | Refills: 0 | Status: SHIPPED | OUTPATIENT
Start: 2024-10-14 | End: 2024-10-16 | Stop reason: SDUPTHER

## 2024-10-14 RX ORDER — FLUTICASONE PROPIONATE 50 MCG
1 SPRAY, SUSPENSION (ML) NASAL DAILY
Qty: 15.8 ML | Refills: 0 | Status: SHIPPED | OUTPATIENT
Start: 2024-10-14 | End: 2024-10-16 | Stop reason: SDUPTHER

## 2024-10-14 NOTE — PROGRESS NOTES
Population Health Chart Review & Patient Outreach Details      Additional [facility] Health Notes:               Updates Requested / Reviewed:      Updated Care Coordination Note and          Health Maintenance Topics Overdue:      [facility] Score: 1     Eye Exam    Tetanus Vaccine  Shingles/Zoster Vaccine  RSV Vaccine                  Health Maintenance Topic(s) Outreach Outcomes & Actions Taken:    Eye Exam - Outreach Outcomes & Actions Taken  : Diabetic Eye External Records Uploaded, Care Team & History Updated if Applicable

## 2024-10-14 NOTE — ED PROVIDER NOTES
Encounter Date: 10/14/2024       History     Chief Complaint   Patient presents with    General Illness     Pt. C/o cough, sinus drainage, body aches x 3-4 days.      POV the ED with family.  Somewhat similar complaints.  Family plan 1/4.  Patient states she has coughing congestion with body aches, hurting all over for 3 days.  Denies chest pain or SOB.  No abdominal pain.  No fever or vomiting.  No other complaints    The history is provided by the patient.     Review of patient's allergies indicates:   Allergen Reactions    Cefuroxime Rash    Ketorolac tromethamine Other (See Comments)     Other reaction(s): Burning    Moxifloxacin Other (See Comments)     Heart racing.    Codeine Itching    Famotidine-ca carb-mag hydrox Hives    Ketorolac Other (See Comments)    Prevacid [lansoprazole] Itching     Past Medical History:   Diagnosis Date    Anxiety     Arthritis     Chronic sinusitis     Diabetes mellitus     Hypertension     IVONNE (iron deficiency anemia)      Past Surgical History:   Procedure Laterality Date    BREAST BIOPSY      BREAST LUMPECTOMY Left     BUNIONECTOMY Bilateral     COLONOSCOPY      ESOPHAGOGASTRODUODENOSCOPY      ETHMOIDECTOMY Bilateral 02/07/2019    Procedure: ETHMOIDECTOMY;  Surgeon: Michael Grubbs MD;  Location: [facility] OR;  Service: ENT;  Laterality: Bilateral;  endoscopic anterior and posterior    FUNCTIONAL ENDOSCOPIC SINUS SURGERY (FESS) N/A 02/07/2019    Procedure: FESS (FUNCTIONAL ENDOSCOPIC SINUS SURGERY);  Surgeon: Michael Grubbs MD;  Location: [facility] OR;  Service: ENT;  Laterality: N/A;    FUNCTIONAL ENDOSCOPIC SINUS SURGERY (FESS) Bilateral 12/19/2019    Procedure: FESS (FUNCTIONAL ENDOSCOPIC SINUS SURGERY);  Surgeon: Michael Grubbs MD;  Location: [facility] OR;  Service: ENT;  Laterality: Bilateral;    MAXILLARY ANTROSTOMY Bilateral 02/07/2019    Procedure: MAXILLARY ANTROSTOMY;  Surgeon: Michael Grubbs MD;  Location: [facility] OR;  Service: ENT;  Laterality: Bilateral;    NASAL  SEPTOPLASTY Bilateral 2019    Procedure: SEPTOPLASTY, NOSE;  Surgeon: Michael Grubbs MD;  Location: [facility] OR;  Service: ENT;  Laterality: Bilateral;    NASAL TURBINATE REDUCTION Bilateral 2019    Procedure: REDUCTION, NASAL TURBINATE;  Surgeon: Michael Grubbs MD;  Location: [facility] OR;  Service: ENT;  Laterality: Bilateral;    SINUS SURGERY      x4    TUBAL LIGATION      WRIST SURGERY Left 2016    [facility]Dr. Smalls     Family History   Problem Relation Name Age of Onset    Leukemia Son      Breast cancer Neg Hx       Social History     Tobacco Use    Smoking status: Former     Current packs/day: 0.00     Average packs/day: 0.8 packs/day for 25.0 years (18.8 ttl pk-yrs)     Types: Cigarettes     Start date: 1974     Quit date: 1999     Years since quittin.5    Smokeless tobacco: Never   Substance Use Topics    Alcohol use: Yes     Comment: social    Drug use: Not Currently     Types: Other-see comments     Comment: tramadol as prescribed     Review of Systems   Constitutional:  Positive for fatigue. Negative for chills and fever.   HENT:  Positive for congestion.    Respiratory:  Positive for cough. Negative for shortness of breath.    Gastrointestinal:  Negative for abdominal pain, diarrhea, nausea and vomiting.   Musculoskeletal:  Positive for myalgias.   All other systems reviewed and are negative.      Physical Exam     Initial Vitals [10/14/24 1230]   BP Pulse Resp Temp SpO2   132/63 78 16 98.9 °F (37.2 °C) 95 %      MAP       --         Physical Exam    Nursing note and vitals reviewed.  Constitutional: She appears well-developed and well-nourished. No distress.   HENT:   Head: Normocephalic and atraumatic. Mouth/Throat: Oropharynx is clear and moist.   Eyes: Pupils are equal, round, and reactive to light.   Neck:   Normal range of motion.  Cardiovascular:  Normal rate and regular rhythm.           Pulmonary/Chest: Breath sounds normal. No respiratory distress.    Abdominal: Abdomen is soft. There is no abdominal tenderness.   Musculoskeletal:         General: Normal range of motion.      Cervical back: Normal range of motion.     Neurological: She is alert and oriented to person, place, and time. She has normal strength. GCS score is 15. GCS eye subscore is 4. GCS verbal subscore is 5. GCS motor subscore is 6.   Skin: Skin is warm and dry. Capillary refill takes less than 2 seconds.   Psychiatric: She has a normal mood and affect. Thought content normal.         ED Course   Procedures  Labs Reviewed   INFLUENZA A & B BY MOLECULAR       Result Value    Influenza A, Molecular Negative      Influenza B, Molecular Negative      Flu A & B Source Nasal Swab     GROUP A STREP, MOLECULAR    Group A Strep, Molecular Negative     SARS-COV-2 RNA AMPLIFICATION, QUAL    SARS-CoV-2 RNA, Amplification, Qual Negative            Imaging Results              X-Ray Chest PA And Lateral (Final result)  Result time 10/14/24 13:17:34      Final result by Holland Hill MD (10/14/24 13:17:34)                   Impression:      No acute chest disease.      Electronically signed by: Holland Hill  Date:    10/14/2024  Time:    13:17               Narrative:    EXAMINATION:  XR CHEST PA AND LATERAL    CLINICAL HISTORY:  Cough, unspecified    TECHNIQUE:  PA and lateral views of the chest were performed.    COMPARISON:  03/09/2023.    FINDINGS:  The lungs are clear.  No focal consolidation.  Heart size is normal.  Mediastinal contours unremarkable.    Bony thorax intact.                                    X-Rays:   Independently Interpreted Readings:   Other Readings:  EXAMINATION:  XR CHEST PA AND LATERAL     CLINICAL HISTORY:  Cough, unspecified     TECHNIQUE:  PA and lateral views of the chest were performed.     COMPARISON:  03/09/2023.     FINDINGS:  The lungs are clear.  No focal consolidation.  Heart size is normal.  Mediastinal contours unremarkable.     Bony thorax intact.      Impression:     No acute chest disease.      Medications - No data to display  Medical Decision Making  Presents for evaluation of upper respiratory illness, lab work ordered.  See HPI  Differentials include but not limited to viral illness, bacterial illness, otitis, sinusitis, bronchitis  Discharged home, diagnosis viral illness, instructed to Rest, increase fluids, lots of water and liquids.  Tylenol and or Motrin as needed if not contraindicated.  Call clinic for office recheck.  Return as needed.  Negative chest x-ray today, negative COVID, flu, strep today. Agrees with plan of care    Amount and/or Complexity of Data Reviewed  Radiology: ordered. Decision-making details documented in ED Course.    Risk  OTC drugs.  Prescription drug management.                                      Clinical Impression:  Final diagnoses:  [R05.9] Cough  [J06.9] Viral URI with cough (Primary)          ED Disposition Condition    Discharge Stable          ED Prescriptions       Medication Sig Dispense Start Date End Date Auth. Provider    benzonatate (TESSALON) 200 MG capsule Take 1 capsule (200 mg total) by mouth 3 (three) times daily as needed for Cough. 60 capsule 10/14/2024 -- Jennie Najera NP    fluticasone propionate (FLONASE) 50 mcg/actuation nasal spray 1 spray (50 mcg total) by Each Nostril route once daily. 15.8 mL 10/14/2024 -- Jennie Najera NP          Follow-up Information       Follow up With Specialties Details Why Contact Info    Sonya Blackman NP Family Medicine Call in 3 days  [address] [facility]  [address]LO[address]  [facility] [place] [address] [phone]               Jennie Najera NP  10/14/24 2374

## 2024-10-14 NOTE — DISCHARGE INSTRUCTIONS
Rest, increase fluids, lots of water and liquids.  Tylenol and or Motrin as needed if not contraindicated.  Call clinic for office recheck.  Return as needed.  Negative chest x-ray today, negative COVID, flu, strep today.

## 2024-10-15 ENCOUNTER — TELEPHONE (OUTPATIENT)
Dept: FAMILY MEDICINE | Facility: CLINIC | Age: 74
End: 2024-10-15
Payer: MEDICARE

## 2024-10-15 RX ORDER — DIPHENHYDRAMINE HYDROCHLORIDE 50 MG/ML
50 INJECTION INTRAMUSCULAR; INTRAVENOUS ONCE AS NEEDED
OUTPATIENT
Start: 2024-10-15

## 2024-10-15 RX ORDER — EPINEPHRINE 0.3 MG/.3ML
0.3 INJECTION SUBCUTANEOUS ONCE AS NEEDED
OUTPATIENT
Start: 2024-10-15

## 2024-10-16 ENCOUNTER — OFFICE VISIT (OUTPATIENT)
Dept: FAMILY MEDICINE | Facility: CLINIC | Age: 74
End: 2024-10-16
Payer: MEDICARE

## 2024-10-16 VITALS
TEMPERATURE: 98 F | BODY MASS INDEX: 33.86 KG/M2 | HEART RATE: 88 BPM | SYSTOLIC BLOOD PRESSURE: 130 MMHG | WEIGHT: 184 LBS | HEIGHT: 62 IN | DIASTOLIC BLOOD PRESSURE: 70 MMHG | OXYGEN SATURATION: 98 %

## 2024-10-16 DIAGNOSIS — M19.039 WRIST ARTHRITIS: ICD-10-CM

## 2024-10-16 DIAGNOSIS — A49.9 BACTERIAL INFECTION: Primary | ICD-10-CM

## 2024-10-16 PROCEDURE — 3046F HEMOGLOBIN A1C LEVEL >9.0%: CPT | Mod: CPTII,S$GLB,, | Performed by: STUDENT IN AN ORGANIZED HEALTH CARE EDUCATION/TRAINING PROGRAM

## 2024-10-16 PROCEDURE — 99999 PR PBB SHADOW E&M-EST. PATIENT-LVL V: CPT | Mod: PBBFAC,,, | Performed by: STUDENT IN AN ORGANIZED HEALTH CARE EDUCATION/TRAINING PROGRAM

## 2024-10-16 PROCEDURE — 1159F MED LIST DOCD IN RCRD: CPT | Mod: CPTII,S$GLB,, | Performed by: STUDENT IN AN ORGANIZED HEALTH CARE EDUCATION/TRAINING PROGRAM

## 2024-10-16 PROCEDURE — 1157F ADVNC CARE PLAN IN RCRD: CPT | Mod: CPTII,S$GLB,, | Performed by: STUDENT IN AN ORGANIZED HEALTH CARE EDUCATION/TRAINING PROGRAM

## 2024-10-16 PROCEDURE — 99214 OFFICE O/P EST MOD 30 MIN: CPT | Mod: S$GLB,,, | Performed by: STUDENT IN AN ORGANIZED HEALTH CARE EDUCATION/TRAINING PROGRAM

## 2024-10-16 PROCEDURE — 3008F BODY MASS INDEX DOCD: CPT | Mod: CPTII,S$GLB,, | Performed by: STUDENT IN AN ORGANIZED HEALTH CARE EDUCATION/TRAINING PROGRAM

## 2024-10-16 PROCEDURE — 1125F AMNT PAIN NOTED PAIN PRSNT: CPT | Mod: CPTII,S$GLB,, | Performed by: STUDENT IN AN ORGANIZED HEALTH CARE EDUCATION/TRAINING PROGRAM

## 2024-10-16 PROCEDURE — 3288F FALL RISK ASSESSMENT DOCD: CPT | Mod: CPTII,S$GLB,, | Performed by: STUDENT IN AN ORGANIZED HEALTH CARE EDUCATION/TRAINING PROGRAM

## 2024-10-16 PROCEDURE — 3075F SYST BP GE 130 - 139MM HG: CPT | Mod: CPTII,S$GLB,, | Performed by: STUDENT IN AN ORGANIZED HEALTH CARE EDUCATION/TRAINING PROGRAM

## 2024-10-16 PROCEDURE — 1101F PT FALLS ASSESS-DOCD LE1/YR: CPT | Mod: CPTII,S$GLB,, | Performed by: STUDENT IN AN ORGANIZED HEALTH CARE EDUCATION/TRAINING PROGRAM

## 2024-10-16 PROCEDURE — 2023F DILAT RTA XM W/O RTNOPTHY: CPT | Mod: CPTII,S$GLB,, | Performed by: STUDENT IN AN ORGANIZED HEALTH CARE EDUCATION/TRAINING PROGRAM

## 2024-10-16 PROCEDURE — 3078F DIAST BP <80 MM HG: CPT | Mod: CPTII,S$GLB,, | Performed by: STUDENT IN AN ORGANIZED HEALTH CARE EDUCATION/TRAINING PROGRAM

## 2024-10-16 RX ORDER — FLUTICASONE PROPIONATE 50 MCG
1 SPRAY, SUSPENSION (ML) NASAL DAILY
Qty: 15.8 ML | Refills: 0 | Status: SHIPPED | OUTPATIENT
Start: 2024-10-16

## 2024-10-16 RX ORDER — AMOXICILLIN AND CLAVULANATE POTASSIUM 875; 125 MG/1; MG/1
1 TABLET, FILM COATED ORAL 2 TIMES DAILY
Qty: 20 TABLET | Refills: 0 | Status: SHIPPED | OUTPATIENT
Start: 2024-10-16

## 2024-10-16 RX ORDER — LEVOCETIRIZINE DIHYDROCHLORIDE 5 MG/1
5 TABLET, FILM COATED ORAL NIGHTLY
Qty: 30 TABLET | Refills: 11 | Status: SHIPPED | OUTPATIENT
Start: 2024-10-16 | End: 2025-10-16

## 2024-10-16 RX ORDER — AZELASTINE 1 MG/ML
1 SPRAY, METERED NASAL 2 TIMES DAILY PRN
Qty: 30 ML | Refills: 11 | Status: SHIPPED | OUTPATIENT
Start: 2024-10-16

## 2024-10-16 RX ORDER — PROMETHAZINE HYDROCHLORIDE AND DEXTROMETHORPHAN HYDROBROMIDE 6.25; 15 MG/5ML; MG/5ML
5 SYRUP ORAL EVERY 4 HOURS PRN
Qty: 150 ML | Refills: 0 | Status: SHIPPED | OUTPATIENT
Start: 2024-10-16 | End: 2024-10-26

## 2024-10-16 RX ORDER — BENZONATATE 200 MG/1
200 CAPSULE ORAL 3 TIMES DAILY PRN
Qty: 60 CAPSULE | Refills: 0 | Status: SHIPPED | OUTPATIENT
Start: 2024-10-16

## 2024-10-16 RX ORDER — GUAIFENESIN 600 MG/1
1200 TABLET, EXTENDED RELEASE ORAL 2 TIMES DAILY
Qty: 30 TABLET | Refills: 0 | Status: SHIPPED | OUTPATIENT
Start: 2024-10-16

## 2024-10-16 NOTE — TELEPHONE ENCOUNTER
Venofer ordered  
Jaison Chairez, Sonya Lyons NP; RAMANDEEP Hassan Staff  Auth request was denied because Injectafer is not preferred. Patient has to try/fail Infed, Venofer or Ferrlecit, which are preferred before injectafer can be approved.  
(4) excellent

## 2024-10-17 NOTE — PROGRESS NOTES
SUBJECTIVE:    CHIEF COMPLAINT:   Chief Complaint   Patient presents with    Fever    Cough    Nasal Congestion           274}    HISTORY OF PRESENT ILLNESS:  Argelia Pyle is a 74 y.o. female who presents to the clinic today for problem visit     Patient c/o congestion, sinus pressure, productive cough, rhinorrhea, sore throat. Patient also admits to having ear pain    Patient has chronic arthritis in the right that needed to do surgery. Patient wishes to go to pain management for the right hand.    PAST MEDICAL HISTORY:     274}  Past Medical History:   Diagnosis Date    Anxiety     Arthritis     Chronic sinusitis     Diabetes mellitus     Hypertension     IVONNE (iron deficiency anemia)        PAST SURGICAL HISTORY:  Past Surgical History:   Procedure Laterality Date    BREAST BIOPSY      BREAST LUMPECTOMY Left     BUNIONECTOMY Bilateral     COLONOSCOPY      ESOPHAGOGASTRODUODENOSCOPY      ETHMOIDECTOMY Bilateral 02/07/2019    Procedure: ETHMOIDECTOMY;  Surgeon: Michael Grubbs MD;  Location: [facility] OR;  Service: ENT;  Laterality: Bilateral;  endoscopic anterior and posterior    FUNCTIONAL ENDOSCOPIC SINUS SURGERY (FESS) N/A 02/07/2019    Procedure: FESS (FUNCTIONAL ENDOSCOPIC SINUS SURGERY);  Surgeon: Michael Grubbs MD;  Location: [facility] OR;  Service: ENT;  Laterality: N/A;    FUNCTIONAL ENDOSCOPIC SINUS SURGERY (FESS) Bilateral 12/19/2019    Procedure: FESS (FUNCTIONAL ENDOSCOPIC SINUS SURGERY);  Surgeon: Michael Grubbs MD;  Location: [facility] OR;  Service: ENT;  Laterality: Bilateral;    MAXILLARY ANTROSTOMY Bilateral 02/07/2019    Procedure: MAXILLARY ANTROSTOMY;  Surgeon: Michael Grubbs MD;  Location: [facility] OR;  Service: ENT;  Laterality: Bilateral;    NASAL SEPTOPLASTY Bilateral 02/07/2019    Procedure: SEPTOPLASTY, NOSE;  Surgeon: Michael Grubbs MD;  Location: [facility] OR;  Service: ENT;  Laterality: Bilateral;    NASAL TURBINATE REDUCTION Bilateral 02/07/2019    Procedure: REDUCTION, NASAL  TURBINATE;  Surgeon: Michael Grubbs MD;  Location: [facility] OR;  Service: ENT;  Laterality: Bilateral;    SINUS SURGERY      x4    TUBAL LIGATION      WRIST SURGERY Left 2016    [facility]-Dr. Smalls       SOCIAL HISTORY:  Social History     Socioeconomic History    Marital status:    Tobacco Use    Smoking status: Former     Current packs/day: 0.00     Average packs/day: 0.8 packs/day for 25.0 years (18.8 ttl pk-yrs)     Types: Cigarettes     Start date: 1974     Quit date: 1999     Years since quittin.5    Smokeless tobacco: Never   Substance and Sexual Activity    Alcohol use: Yes     Comment: social    Drug use: Not Currently     Types: Other-see comments     Comment: tramadol as prescribed    Sexual activity: Not Currently     Birth control/protection: See Surgical Hx       FAMILY HISTORY:       Family History   Problem Relation Name Age of Onset    Leukemia Son      Breast cancer Neg Hx         ALLERGIES AND MEDICATIONS: updated and reviewed.      274}  Review of patient's allergies indicates:   Allergen Reactions    Cefuroxime Rash    Ketorolac tromethamine Other (See Comments)     Other reaction(s): Burning    Moxifloxacin Other (See Comments)     Heart racing.    Codeine Itching    Famotidine-ca carb-mag hydrox Hives    Ketorolac Other (See Comments)    Prevacid [lansoprazole] Itching     Medication List with Changes/Refills   New Medications    AMOXICILLIN-CLAVULANATE 875-125MG (AUGMENTIN) 875-125 MG PER TABLET    Take 1 tablet by mouth 2 (two) times daily.    GUAIFENESIN (MUCINEX) 600 MG 12 HR TABLET    Take 2 tablets (1,200 mg total) by mouth 2 (two) times daily.    LEVOCETIRIZINE (XYZAL) 5 MG TABLET    Take 1 tablet (5 mg total) by mouth every evening.    PROMETHAZINE-DEXTROMETHORPHAN (PROMETHAZINE-DM) 6.25-15 MG/5 ML SYRP    Take 5 mLs by mouth every 4 (four) hours as needed (cough).   Current Medications    ALBUTEROL (PROVENTIL/VENTOLIN HFA) 90 MCG/ACTUATION INHALER     Inhale 2 puffs into the lungs every 4 (four) hours as needed for Wheezing or Shortness of Breath.    AMLODIPINE (NORVASC) 10 MG TABLET    Take 1 tablet (10 mg total) by mouth every evening.    ASPIRIN (ECOTRIN) 81 MG EC TABLET    Take 1 tablet (81 mg total) by mouth every evening.    BLOOD SUGAR DIAGNOSTIC STRP    To check BG 3 times daily, to use with insurance preferred meter    BLOOD-GLUCOSE METER KIT    To check BG 3 times daily, to use with insurance preferred meter    BUSPIRONE (BUSPAR) 30 MG TAB    Take 1 tablet (30 mg total) by mouth 2 (two) times daily.    CETIRIZINE (ZYRTEC) 10 MG TABLET    Take 1 tablet (10 mg total) by mouth daily as needed for Allergies.    CYCLOBENZAPRINE (FLEXERIL) 5 MG TABLET    Take 1 tablet (5 mg total) by mouth 3 (three) times daily as needed for Muscle spasms.    DICLOFENAC SODIUM (VOLTAREN ARTHRITIS PAIN) 1 % GEL    Apply 2 g topically once daily. for 10 days    EMPAGLIFLOZIN (JARDIANCE) 25 MG TABLET    Take 1 tablet (25 mg total) by mouth every morning.    GLIPIZIDE (GLUCOTROL) 10 MG TABLET    Take 1 tablet (10 mg total) by mouth 2 (two) times daily before meals.    IBUPROFEN (ADVIL,MOTRIN) 800 MG TABLET    Take 1 tablet (800 mg total) by mouth 2 (two) times daily as needed for Pain.    INSULIN GLARGINE U-100, LANTUS, (LANTUS SOLOSTAR U-100 INSULIN) 100 UNIT/ML (3 ML) INPN PEN    Inject 20 Units into the skin every evening.    LANCETS MISC    To check BG 3 times daily, to use with insurance preferred meter    LOSARTAN-HYDROCHLOROTHIAZIDE 100-25 MG (HYZAAR) 100-25 MG PER TABLET    Take 1 tablet by mouth every morning.    METFORMIN (GLUCOPHAGE) 1000 MG TABLET    Take 1 tablet (1,000 mg total) by mouth 2 (two) times daily.    METOPROLOL TARTRATE (LOPRESSOR) 50 MG TABLET    Take 1 tablet (50 mg total) by mouth 2 (two) times daily.    MONTELUKAST (SINGULAIR) 10 MG TABLET    Take 10 mg by mouth every evening.    PANTOPRAZOLE (PROTONIX) 20 MG TABLET    Take 1 tablet (20 mg total) by  "mouth every morning.    PAROXETINE (PAXIL) 20 MG TABLET    Take 1 tablet (20 mg total) by mouth every evening.    PEN NEEDLE, DIABETIC (BD GIUSEPPE 2ND GEN PEN NEEDLE) 32 GAUGE X 5/32" NDLE    1 Device by Misc.(Non-Drug; Combo Route) route nightly.    PREGABALIN (LYRICA) 75 MG CAPSULE    Take 1 capsule (75 mg total) by mouth 2 (two) times daily.    SIMVASTATIN (ZOCOR) 20 MG TABLET    Take 1 tablet (20 mg total) by mouth nightly.    TRAZODONE (DESYREL) 50 MG TABLET    Take 1 tablet (50 mg total) by mouth every evening.   Changed and/or Refilled Medications    Modified Medication Previous Medication    AZELASTINE (ASTELIN) 137 MCG (0.1 %) NASAL SPRAY azelastine (ASTELIN) 137 mcg (0.1 %) nasal spray       1 spray (137 mcg total) by Nasal route 2 (two) times daily as needed for Rhinitis.    1 spray (137 mcg total) by Nasal route 2 (two) times daily as needed for Rhinitis.    BENZONATATE (TESSALON) 200 MG CAPSULE benzonatate (TESSALON) 200 MG capsule       Take 1 capsule (200 mg total) by mouth 3 (three) times daily as needed for Cough.    Take 1 capsule (200 mg total) by mouth 3 (three) times daily as needed for Cough.    FLUTICASONE PROPIONATE (FLONASE) 50 MCG/ACTUATION NASAL SPRAY fluticasone propionate (FLONASE) 50 mcg/actuation nasal spray       1 spray (50 mcg total) by Each Nostril route once daily.    1 spray (50 mcg total) by Each Nostril route once daily.       SCREENING HISTORY:    274}  Health Maintenance         Date Due Completion Date    TETANUS VACCINE Never done ---    Shingles Vaccine (1 of 2) Never done ---    RSV Vaccine (Age 60+ and Pregnant patients) (1 - Risk 60-74 years 1-dose series) Never done ---    COVID-19 Vaccine (5 - 2024-25 season) 09/01/2024 6/27/2022    Diabetes Urine Screening 11/14/2024 11/14/2023    Hemoglobin A1c 12/20/2024 9/20/2024    Mammogram 03/13/2025 3/13/2024    Lipid Panel 06/10/2025 6/10/2024    Foot Exam 06/21/2025 6/21/2024    Eye Exam 09/25/2025 9/25/2024    Low Dose Statin " "10/16/2025 10/16/2024    DEXA Scan 02/27/2026 2/27/2023    Colorectal Cancer Screening 05/23/2032 5/23/2022            REVIEW OF SYSTEMS:   Review of Systems   Constitutional:  Negative for chills, fever, malaise/fatigue and weight loss.   HENT:  Positive for congestion, ear pain, sinus pain and sore throat. Negative for ear discharge and nosebleeds.    Respiratory:  Negative for cough, shortness of breath, wheezing and stridor.    Cardiovascular:  Negative for chest pain, palpitations and leg swelling.   Gastrointestinal:  Negative for abdominal pain, diarrhea, heartburn, nausea and vomiting.   Musculoskeletal:  Negative for back pain, joint pain, myalgias and neck pain.   Neurological:  Negative for dizziness, tingling, tremors, sensory change and headaches.   Psychiatric/Behavioral:  Negative for depression. The patient is not nervous/anxious.        PHYSICAL EXAM:      274}  /70 (BP Location: Left arm, Patient Position: Sitting)   Pulse 88   Temp 98.4 °F (36.9 °C) (Oral)   Ht 5' 2" (1.575 m)   Wt 83.5 kg (184 lb)   SpO2 98%   BMI 33.65 kg/m²   Wt Readings from Last 3 Encounters:   10/16/24 83.5 kg (184 lb)   10/14/24 81.6 kg (180 lb)   10/03/24 82.6 kg (182 lb 3.2 oz)     BP Readings from Last 3 Encounters:   10/16/24 130/70   10/14/24 132/63   09/23/24 130/68     Estimated body mass index is 33.65 kg/m² as calculated from the following:    Height as of this encounter: 5' 2" (1.575 m).    Weight as of this encounter: 83.5 kg (184 lb).     Physical Exam  Constitutional:       Appearance: Normal appearance.   HENT:      Head: Normocephalic and atraumatic.      Nose: Nose normal.      Mouth/Throat:      Mouth: Mucous membranes are dry.      Pharynx: Oropharynx is clear.   Eyes:      Extraocular Movements: Extraocular movements intact.      Conjunctiva/sclera: Conjunctivae normal.   Cardiovascular:      Rate and Rhythm: Normal rate and regular rhythm.   Pulmonary:      Effort: Pulmonary effort is normal. "      Breath sounds: Normal breath sounds.   Abdominal:      General: Bowel sounds are normal.      Palpations: Abdomen is soft.   Musculoskeletal:         General: Normal range of motion.      Cervical back: Normal range of motion.   Skin:     General: Skin is warm.   Neurological:      General: No focal deficit present.      Mental Status: She is alert. Mental status is at baseline.   Psychiatric:         Mood and Affect: Mood normal.         Thought Content: Thought content normal.         LABS:   274}  I have reviewed old labs below:  Lab Results   Component Value Date    WBC 7.42 06/10/2024    HGB 11.8 (L) 06/10/2024    HCT 37.9 06/10/2024    MCV 69 (L) 06/10/2024     06/10/2024     09/20/2024    K 3.7 09/20/2024     09/20/2024    CALCIUM 9.9 09/20/2024    CO2 25 09/20/2024     (H) 09/20/2024    BUN 15 09/20/2024    CREATININE 1.0 09/20/2024    ANIONGAP 13 09/20/2024    ESTGFRAFRICA >60.0 02/08/2021    EGFRNONAA 58.0 (A) 02/08/2021    PROT 8.3 06/10/2024    ALBUMIN 3.8 06/10/2024    BILITOT 0.3 06/10/2024    ALKPHOS 100 06/10/2024    ALT 11 06/10/2024    AST 18 06/10/2024    CHOL 160 06/10/2024    TRIG 75 06/10/2024    HDL 63 06/10/2024    LDLCALC 82.0 06/10/2024    TSH 1.212 06/10/2024    HGBA1C 11.6 (H) 09/20/2024       ASSESSMENT AND PLAN:  274}  1. Bacterial infection  - fluticasone propionate (FLONASE) 50 mcg/actuation nasal spray; 1 spray (50 mcg total) by Each Nostril route once daily.  Dispense: 15.8 mL; Refill: 0  - azelastine (ASTELIN) 137 mcg (0.1 %) nasal spray; 1 spray (137 mcg total) by Nasal route 2 (two) times daily as needed for Rhinitis.  Dispense: 30 mL; Refill: 11  - amoxicillin-clavulanate 875-125mg (AUGMENTIN) 875-125 mg per tablet; Take 1 tablet by mouth 2 (two) times daily.  Dispense: 20 tablet; Refill: 0  - guaiFENesin (MUCINEX) 600 mg 12 hr tablet; Take 2 tablets (1,200 mg total) by mouth 2 (two) times daily.  Dispense: 30 tablet; Refill: 0  -  promethazine-dextromethorphan (PROMETHAZINE-DM) 6.25-15 mg/5 mL Syrp; Take 5 mLs by mouth every 4 (four) hours as needed (cough).  Dispense: 150 mL; Refill: 0  - benzonatate (TESSALON) 200 MG capsule; Take 1 capsule (200 mg total) by mouth 3 (three) times daily as needed for Cough.  Dispense: 60 capsule; Refill: 0  - levocetirizine (XYZAL) 5 MG tablet; Take 1 tablet (5 mg total) by mouth every evening.  Dispense: 30 tablet; Refill: 11    2. Wrist arthritis  - Ambulatory referral/consult to Pain Clinic; Future         Orders Placed This Encounter   Procedures    Ambulatory referral/consult to Pain Clinic       No follow-ups on file. or sooner as needed.

## 2024-10-28 ENCOUNTER — TELEPHONE (OUTPATIENT)
Dept: FAMILY MEDICINE | Facility: CLINIC | Age: 74
End: 2024-10-28
Payer: MEDICARE

## 2024-10-28 RX ORDER — DIPHENHYDRAMINE HYDROCHLORIDE 50 MG/ML
50 INJECTION INTRAMUSCULAR; INTRAVENOUS ONCE AS NEEDED
OUTPATIENT
Start: 2024-10-28

## 2024-10-28 RX ORDER — EPINEPHRINE 0.3 MG/.3ML
0.3 INJECTION SUBCUTANEOUS ONCE AS NEEDED
OUTPATIENT
Start: 2024-10-28

## 2024-11-08 ENCOUNTER — INFUSION (OUTPATIENT)
Dept: INFUSION THERAPY | Facility: HOSPITAL | Age: 74
End: 2024-11-08
Attending: NURSE PRACTITIONER
Payer: MEDICARE

## 2024-11-08 VITALS
HEART RATE: 62 BPM | SYSTOLIC BLOOD PRESSURE: 137 MMHG | DIASTOLIC BLOOD PRESSURE: 64 MMHG | TEMPERATURE: 98 F | OXYGEN SATURATION: 95 % | RESPIRATION RATE: 18 BRPM

## 2024-11-08 DIAGNOSIS — D50.8 IRON DEFICIENCY ANEMIA SECONDARY TO INADEQUATE DIETARY IRON INTAKE: Primary | ICD-10-CM

## 2024-11-08 PROCEDURE — 63600175 PHARM REV CODE 636 W HCPCS: Performed by: NURSE PRACTITIONER

## 2024-11-08 PROCEDURE — 96365 THER/PROPH/DIAG IV INF INIT: CPT

## 2024-11-08 PROCEDURE — 25000003 PHARM REV CODE 250: Performed by: NURSE PRACTITIONER

## 2024-11-08 RX ORDER — DIPHENHYDRAMINE HYDROCHLORIDE 50 MG/ML
50 INJECTION INTRAMUSCULAR; INTRAVENOUS ONCE AS NEEDED
OUTPATIENT
Start: 2024-11-15

## 2024-11-08 RX ORDER — EPINEPHRINE 0.3 MG/.3ML
0.3 INJECTION SUBCUTANEOUS ONCE AS NEEDED
OUTPATIENT
Start: 2024-11-15

## 2024-11-08 RX ADMIN — SODIUM CHLORIDE 125 MG: 9 INJECTION, SOLUTION INTRAVENOUS at 10:11

## 2024-11-08 NOTE — PLAN OF CARE
Problem: Adult Inpatient Plan of Care  Goal: Optimal Comfort and Wellbeing  Outcome: Progressing  Intervention: Provide Person-Centered Care  Flowsheets (Taken 11/8/2024 1041)  Trust Relationship/Rapport:   care explained   choices provided   emotional support provided   empathic listening provided   questions answered   questions encouraged   reassurance provided   thoughts/feelings acknowledged

## 2024-11-11 ENCOUNTER — TELEPHONE (OUTPATIENT)
Dept: PAIN MEDICINE | Facility: CLINIC | Age: 74
End: 2024-11-11
Payer: MEDICARE

## 2024-11-11 NOTE — TELEPHONE ENCOUNTER
----- Message from Dayamifilemon sent at 11/11/2024  3:20 PM CST -----  Regarding: appt  Type:  Sooner Apoointment Request      Name of Caller:pt    When is the first available appointment?dept booked     Symptoms:Wrist arthritis/ referral on chart       Best Call Back Number:[phone]      Additional Information: pt is looking to get schedule.  please call to discuss.

## 2024-11-11 NOTE — TELEPHONE ENCOUNTER
Pt has [employer]na dual complete which is not a plan we are in network with.     The patients insurance has no benefits for services at this location. The patient will have to pay out of pocket for this visit. The patient can contact their insurance carrier to locate providers in their network. By proceeding I acknowledge that I have shared this information with the patient.       Informed pt of the above .

## 2024-11-13 ENCOUNTER — PATIENT OUTREACH (OUTPATIENT)
Dept: DIABETES | Facility: CLINIC | Age: 74
End: 2024-11-13
Payer: MEDICARE

## 2024-11-14 ENCOUNTER — NUTRITION (OUTPATIENT)
Dept: DIABETES | Facility: CLINIC | Age: 74
End: 2024-11-14
Payer: MEDICARE

## 2024-11-14 VITALS — HEIGHT: 62 IN | BODY MASS INDEX: 33.61 KG/M2 | WEIGHT: 182.63 LBS

## 2024-11-14 DIAGNOSIS — E78.5 TYPE 2 DIABETES MELLITUS WITH HYPERLIPIDEMIA: Primary | ICD-10-CM

## 2024-11-14 DIAGNOSIS — E11.69 TYPE 2 DIABETES MELLITUS WITH HYPERLIPIDEMIA: Primary | ICD-10-CM

## 2024-11-14 NOTE — PROGRESS NOTES
"Diabetes Care Specialist Follow-up Note  Author: Arlen Pedraza RD  Date: 11/14/2024    Intake    Program Intake  Reason for Diabetes Program Visit:: Intervention  Type of Intervention:: Individual  Individual: Education  Education: Self-Management Skill Review, Nutrition and Meal Planning    Current Diabetes Treatment: Oral Medications, Insulin  Oral Medication Type/Dose: Jardiance 25 mg daily, Glipizide 10 mg twice daily before meals, Metformin 1,000 mg twice daily  Method of insulin delivery?: Injections  Injection Type: Pens  Pen Type/Dose: Lantus 20 units daily    Continuous Glucose Monitoring  Personal CGM type:: Freestyle Nicolasa 3    Lab Results   Component Value Date    HGBA1C 11.6 (H) 09/20/2024     A1c Pre Diabetes Care Specialist Intervention:  9.4%      Weight: 82.8 kg (182 lb 9.6 oz)   Height: 5' 2" (157.5 cm)   Body mass index is 33.4 kg/m².  No significant weight change since last visit on 10/3/2024      Physical activity/Exercise:   Active with her grand and great grandchildren.     SMBG: Nicolasa 3  Average glucose 181  Very high- 14%  High- 33%  Target Range- 53%  No lows  Unfortunately not enough data for GMI        Diabetes Self-Management Skills Assessment       Nutrition/Healthy Eating  Meal Plan 24 Hour Recall - Breakfast: few bites of froasted flakes with milk  Meal Plan 24 Hour Recall - Lunch: usually just snacks  Meal Plan 24 Hour Recall - Dinner: meat and mashed potatoes or beans  Meal Plan 24 Hour Recall - Snack: grapes, peanut butter crackers, 2 chocolate chips ahoy  Meal Plan 24 Hour Recall - Beverage: water, coffee w/sugar sub, diet tea and cold drinks, milk         Home Blood Glucose Monitoring  Personal CGM type:: Freestyle Nicolasa 3           Today's interventions were provided through individual discussion, instruction, and written materials were provided.    Patient verbalized understanding of instruction and written materials.  Pt was able to return back demonstration of instructions " today. Patient understood key points, needs reinforcement and further instruction.     Diabetes Self-Management Care Plan Review and Evaluation of Progress:    During today's follow-up Argelia's Diabetes Self-Management Care Plan progress was reviewed and progress was evaluated including his/her input. Argelia has agreed to continue his/her journey to improve/maintain overall diabetes control by continuing to set health goals. See care plan progress below.      Care Plan: Diabetes Management   Updates made since 11/15/2023 12:00 AM        Problem: Healthy Eating         Goal: Eat 3 meals daily 2-3 servings of carb per meal. Add protein/healthy fat source to carb snacks. Reduce overall sugar intake.    Start Date: 4/2/2024   Expected End Date: 2/13/2025   This Visit's Progress: On track   Recent Progress: On track   Priority: High   Barriers: No Barriers Identified   Note:    10/3/2024- Patient reports staying with daughter right now so has been having regular sugar in her coffee. Encouraged patient to get some sugar sub. Also made several other recommendations to decrease overall simple sugar intake such as sub fruit (with added protein) or light Greek yogurt for snack when wanting cookies or to choose sugar free option. Also encouraged sugar free chocolate syrup to make her chocolate milk. Patient and granddaughter present today mentioned patient drinking Ensure perhaps. Discussed that having a protein shake is not a bad idea when patient does not have much of an appetite however would recommend the lower sugar variety of protein shake such as Splenda shake, Glucerna or Boost Glucose control. Wrote out all these recommendations along with note to take full 20 unit dose of Lantus daily. Also wrote follow up on the paper as well. Encouraged patient to hang on her fridge.     11/14/2024- Patient reports avoiding a lot of sugar filled things she had eaten before. She reported having a couple of sandwich cookies but too  out all the cream in the center. Using sugar substitute again and drinking plenty of water. Patient reports not trying one of the lower sugar protein shakes yet. Provided patient with a few Splenda shake coupons.          Problem: Blood Glucose Self-Monitoring         Goal: Patient agrees to check blood sugar using a continous glucose monitor.    Start Date: 4/2/2024   Expected End Date: 10/24/2024   This Visit's Progress: On track   Recent Progress: Met   Priority: Medium   Barriers: No Barriers Identified   Note:    10/3/2024- Patient does not have her reader with her today. Made a note on previous paper mentioned to bring reader to follow up. Instructed patient to remove current sensor she is wearing and get started with a new one once she has her reader with her again. Provided her with a sample Nicolasa 3 so she is not to run of them.     11/14/2024- Patient with limited data pulling into [employer] via reader. She reported her reader was at a different location. Currently averaging 181 which would hopefully translate to about an 8% A1c. Patient awaiting hand surgery and needs her blood sugars better in order to have this done. Noted lab work scheduled 12/20 with no PCP follow up on file. Ordered one for patient with Dr Pena as Blanca as no availability before the end of the year. Patient reports she will contact me in about a week or so to report how her blood sugars are looking.            Follow Up Plan     Follow up in about 3 months (around 2/14/2025).    Today's care plan and follow up schedule was discussed with patient.  Argelia verbalized understanding of the care plan, goals, and agrees to follow up plan.        The patient was encouraged to communicate with his/her health care provider/physician and care team regarding his/her condition(s) and treatment.  I provided the patient with my contact information today and encouraged to contact me via phone or [facility] as needed.     Length of Visit    Total Time: 30 Minutes

## 2024-11-15 ENCOUNTER — INFUSION (OUTPATIENT)
Dept: INFUSION THERAPY | Facility: HOSPITAL | Age: 74
End: 2024-11-15
Attending: NURSE PRACTITIONER
Payer: MEDICARE

## 2024-11-15 VITALS
BODY MASS INDEX: 33.88 KG/M2 | SYSTOLIC BLOOD PRESSURE: 131 MMHG | HEART RATE: 61 BPM | TEMPERATURE: 98 F | WEIGHT: 184.13 LBS | OXYGEN SATURATION: 96 % | RESPIRATION RATE: 17 BRPM | HEIGHT: 62 IN | DIASTOLIC BLOOD PRESSURE: 63 MMHG

## 2024-11-15 DIAGNOSIS — D50.8 IRON DEFICIENCY ANEMIA SECONDARY TO INADEQUATE DIETARY IRON INTAKE: Primary | ICD-10-CM

## 2024-11-15 PROCEDURE — 25000003 PHARM REV CODE 250: Performed by: NURSE PRACTITIONER

## 2024-11-15 PROCEDURE — 96365 THER/PROPH/DIAG IV INF INIT: CPT

## 2024-11-15 PROCEDURE — 63600175 PHARM REV CODE 636 W HCPCS: Performed by: NURSE PRACTITIONER

## 2024-11-15 RX ORDER — EPINEPHRINE 0.3 MG/.3ML
0.3 INJECTION SUBCUTANEOUS ONCE AS NEEDED
OUTPATIENT
Start: 2024-11-22

## 2024-11-15 RX ORDER — DIPHENHYDRAMINE HYDROCHLORIDE 50 MG/ML
50 INJECTION INTRAMUSCULAR; INTRAVENOUS ONCE AS NEEDED
OUTPATIENT
Start: 2024-11-22

## 2024-11-15 RX ADMIN — SODIUM CHLORIDE 125 MG: 9 INJECTION, SOLUTION INTRAVENOUS at 01:11

## 2024-11-16 DIAGNOSIS — I10 ESSENTIAL HYPERTENSION: ICD-10-CM

## 2024-11-18 RX ORDER — METOPROLOL TARTRATE 50 MG/1
50 TABLET ORAL 2 TIMES DAILY
Qty: 180 TABLET | Refills: 3 | Status: SHIPPED | OUTPATIENT
Start: 2024-11-18

## 2024-11-18 NOTE — TELEPHONE ENCOUNTER
Refill Routing Note   Medication(s) are not appropriate for processing by [facility] for the following reason(s):        Non-participating provider    [facility] action(s):  Route             Appointments  past 12m or future 3m with PCP    Date Provider   Last Visit   9/23/2024 Sonya Blackman NP   Next Visit   Visit date not found Sonya Blackman NP   ED visits in past 90 days: 1        Note composed:8:07 AM 11/18/2024

## 2024-11-20 DIAGNOSIS — E11.9 TYPE 2 DIABETES MELLITUS WITHOUT COMPLICATION: ICD-10-CM

## 2024-11-22 ENCOUNTER — INFUSION (OUTPATIENT)
Dept: INFUSION THERAPY | Facility: HOSPITAL | Age: 74
End: 2024-11-22
Attending: NURSE PRACTITIONER
Payer: MEDICARE

## 2024-11-22 VITALS
BODY MASS INDEX: 34.24 KG/M2 | RESPIRATION RATE: 16 BRPM | TEMPERATURE: 97 F | SYSTOLIC BLOOD PRESSURE: 132 MMHG | WEIGHT: 186.06 LBS | OXYGEN SATURATION: 96 % | HEIGHT: 62 IN | DIASTOLIC BLOOD PRESSURE: 61 MMHG | HEART RATE: 57 BPM

## 2024-11-22 DIAGNOSIS — D50.8 IRON DEFICIENCY ANEMIA SECONDARY TO INADEQUATE DIETARY IRON INTAKE: Primary | ICD-10-CM

## 2024-11-22 PROCEDURE — 96365 THER/PROPH/DIAG IV INF INIT: CPT

## 2024-11-22 PROCEDURE — 63600175 PHARM REV CODE 636 W HCPCS: Performed by: NURSE PRACTITIONER

## 2024-11-22 PROCEDURE — 25000003 PHARM REV CODE 250: Performed by: NURSE PRACTITIONER

## 2024-11-22 RX ORDER — DIPHENHYDRAMINE HYDROCHLORIDE 50 MG/ML
50 INJECTION INTRAMUSCULAR; INTRAVENOUS ONCE AS NEEDED
OUTPATIENT
Start: 2024-11-29

## 2024-11-22 RX ORDER — EPINEPHRINE 0.3 MG/.3ML
0.3 INJECTION SUBCUTANEOUS ONCE AS NEEDED
OUTPATIENT
Start: 2024-11-29

## 2024-11-22 RX ADMIN — SODIUM CHLORIDE 125 MG: 9 INJECTION, SOLUTION INTRAVENOUS at 10:11

## 2024-11-22 NOTE — PLAN OF CARE
Problem: Adult Inpatient Plan of Care  Goal: Optimal Comfort and Wellbeing  Intervention: Provide Person-Centered Care  Flowsheets (Taken 11/22/2024 1106)  Trust Relationship/Rapport:   care explained   choices provided   thoughts/feelings acknowledged   emotional support provided   empathic listening provided   questions answered   questions encouraged   reassurance provided

## 2024-11-23 DIAGNOSIS — E11.69 TYPE 2 DIABETES MELLITUS WITH HYPERLIPIDEMIA: ICD-10-CM

## 2024-11-23 DIAGNOSIS — E78.5 TYPE 2 DIABETES MELLITUS WITH HYPERLIPIDEMIA: ICD-10-CM

## 2024-11-25 ENCOUNTER — TELEPHONE (OUTPATIENT)
Dept: FAMILY MEDICINE | Facility: CLINIC | Age: 74
End: 2024-11-25
Payer: MEDICARE

## 2024-11-25 RX ORDER — GLIPIZIDE 10 MG/1
10 TABLET ORAL
Qty: 180 TABLET | Refills: 0 | OUTPATIENT
Start: 2024-11-25

## 2024-11-25 NOTE — TELEPHONE ENCOUNTER
Refill Routing Note   Medication(s) are not appropriate for processing by [facility] for the following reason(s):        Non-participating provider  Required labs abnormal  ED/Hospital Visit since last OV with provider    [facility] action(s):  Route               Appointments  past 12m or future 3m with PCP    Date Provider   Last Visit   9/23/2024 Sonya Blackman NP   Next Visit   Visit date not found Sonya Blackman NP   ED visits in past 90 days: 1        Note composed:8:05 AM 11/25/2024

## 2024-11-25 NOTE — TELEPHONE ENCOUNTER
Spoke to granddaughter Kyrinne d/t receiving refill request from [facility] as pt was referred to [facility] for pill packaging and is still getting medi pack. Reports pt has been doing better and glucose is imprved but reports pharmacy making mistakes thus NP to call as this is the 3rd pt I have heard a compliant from. Discussed starting GLP but granddaughter could not give an fasting glucose information.

## 2024-11-29 ENCOUNTER — INFUSION (OUTPATIENT)
Dept: INFUSION THERAPY | Facility: HOSPITAL | Age: 74
End: 2024-11-29
Attending: NURSE PRACTITIONER
Payer: MEDICARE

## 2024-11-29 VITALS
DIASTOLIC BLOOD PRESSURE: 62 MMHG | SYSTOLIC BLOOD PRESSURE: 139 MMHG | BODY MASS INDEX: 34.38 KG/M2 | HEIGHT: 62 IN | HEART RATE: 64 BPM | RESPIRATION RATE: 15 BRPM | WEIGHT: 186.81 LBS | OXYGEN SATURATION: 96 % | TEMPERATURE: 98 F

## 2024-11-29 DIAGNOSIS — D50.8 IRON DEFICIENCY ANEMIA SECONDARY TO INADEQUATE DIETARY IRON INTAKE: Primary | ICD-10-CM

## 2024-11-29 PROCEDURE — 96365 THER/PROPH/DIAG IV INF INIT: CPT

## 2024-11-29 PROCEDURE — 25000003 PHARM REV CODE 250: Performed by: NURSE PRACTITIONER

## 2024-11-29 PROCEDURE — 63600175 PHARM REV CODE 636 W HCPCS: Performed by: NURSE PRACTITIONER

## 2024-11-29 RX ORDER — EPINEPHRINE 0.3 MG/.3ML
0.3 INJECTION SUBCUTANEOUS ONCE AS NEEDED
OUTPATIENT
Start: 2024-12-06

## 2024-11-29 RX ORDER — DIPHENHYDRAMINE HYDROCHLORIDE 50 MG/ML
50 INJECTION INTRAMUSCULAR; INTRAVENOUS ONCE AS NEEDED
OUTPATIENT
Start: 2024-12-06

## 2024-11-29 RX ORDER — DIPHENHYDRAMINE HYDROCHLORIDE 50 MG/ML
50 INJECTION INTRAMUSCULAR; INTRAVENOUS ONCE AS NEEDED
Status: DISCONTINUED | OUTPATIENT
Start: 2024-11-29 | End: 2024-11-29 | Stop reason: HOSPADM

## 2024-11-29 RX ORDER — EPINEPHRINE 0.3 MG/.3ML
0.3 INJECTION SUBCUTANEOUS ONCE AS NEEDED
Status: DISCONTINUED | OUTPATIENT
Start: 2024-11-29 | End: 2024-11-29 | Stop reason: HOSPADM

## 2024-11-29 RX ADMIN — SODIUM CHLORIDE 125 MG: 9 INJECTION, SOLUTION INTRAVENOUS at 10:11

## 2024-12-03 ENCOUNTER — OFFICE VISIT (OUTPATIENT)
Dept: PAIN MEDICINE | Facility: CLINIC | Age: 74
End: 2024-12-03
Payer: MEDICARE

## 2024-12-03 VITALS — HEIGHT: 62 IN | BODY MASS INDEX: 34.23 KG/M2 | WEIGHT: 186 LBS

## 2024-12-03 DIAGNOSIS — M79.641 RIGHT HAND PAIN: Primary | ICD-10-CM

## 2024-12-03 PROCEDURE — 3008F BODY MASS INDEX DOCD: CPT | Mod: CPTII,S$GLB,, | Performed by: STUDENT IN AN ORGANIZED HEALTH CARE EDUCATION/TRAINING PROGRAM

## 2024-12-03 PROCEDURE — 1101F PT FALLS ASSESS-DOCD LE1/YR: CPT | Mod: CPTII,S$GLB,, | Performed by: STUDENT IN AN ORGANIZED HEALTH CARE EDUCATION/TRAINING PROGRAM

## 2024-12-03 PROCEDURE — 1160F RVW MEDS BY RX/DR IN RCRD: CPT | Mod: CPTII,S$GLB,, | Performed by: STUDENT IN AN ORGANIZED HEALTH CARE EDUCATION/TRAINING PROGRAM

## 2024-12-03 PROCEDURE — 99203 OFFICE O/P NEW LOW 30 MIN: CPT | Mod: S$GLB,,, | Performed by: STUDENT IN AN ORGANIZED HEALTH CARE EDUCATION/TRAINING PROGRAM

## 2024-12-03 PROCEDURE — 1157F ADVNC CARE PLAN IN RCRD: CPT | Mod: CPTII,S$GLB,, | Performed by: STUDENT IN AN ORGANIZED HEALTH CARE EDUCATION/TRAINING PROGRAM

## 2024-12-03 PROCEDURE — 3046F HEMOGLOBIN A1C LEVEL >9.0%: CPT | Mod: CPTII,S$GLB,, | Performed by: STUDENT IN AN ORGANIZED HEALTH CARE EDUCATION/TRAINING PROGRAM

## 2024-12-03 PROCEDURE — 3288F FALL RISK ASSESSMENT DOCD: CPT | Mod: CPTII,S$GLB,, | Performed by: STUDENT IN AN ORGANIZED HEALTH CARE EDUCATION/TRAINING PROGRAM

## 2024-12-03 PROCEDURE — 99999 PR PBB SHADOW E&M-EST. PATIENT-LVL IV: CPT | Mod: PBBFAC,,, | Performed by: STUDENT IN AN ORGANIZED HEALTH CARE EDUCATION/TRAINING PROGRAM

## 2024-12-03 PROCEDURE — 1159F MED LIST DOCD IN RCRD: CPT | Mod: CPTII,S$GLB,, | Performed by: STUDENT IN AN ORGANIZED HEALTH CARE EDUCATION/TRAINING PROGRAM

## 2024-12-03 PROCEDURE — 1125F AMNT PAIN NOTED PAIN PRSNT: CPT | Mod: CPTII,S$GLB,, | Performed by: STUDENT IN AN ORGANIZED HEALTH CARE EDUCATION/TRAINING PROGRAM

## 2024-12-03 RX ORDER — MELOXICAM 15 MG/1
15 TABLET ORAL DAILY
Qty: 30 TABLET | Refills: 2 | Status: SHIPPED | OUTPATIENT
Start: 2024-12-03

## 2024-12-03 NOTE — PROGRESS NOTES
[facility] - Department    Sonya Blackman NP      First Office Visit: 12/3/24  Today' Date: 12/3/2024  Last Office Visit: None    Chief complaint: R hand pain     HPI: Pt is a very pleasant 74 y.o., who presents for evaluation. Referred by self. Pt complains of right hand pain for years.  Patient states she has right carpal tunnel and cubital tunnel syndrome and is planning on having surgery in 3 months.  Endorses pain that stays in the right hand mostly.  Patient does have osteoarthritis of the hands.  Also states the pain sometimes travels up from the wrist to the elbow.        Pain disability index score: 63  Pain score: 10    Relevant Imaging/ Testing:   XR R hand 7/24 - degenerative osteoarthrosis 1st and 2nd MCP       Procedures: None    Date of board of pharmacy review:12/3/2024  Date of opioid risk screening/ pain psych: None  Date of opioid agreement and consent: None  Date of urine drug screen: None  Date of random pill count: None     was reviewed today: reviewed, no concerns     Prescribed medications: None    See EHR for  PMH, PSH, FH, SH, Medications and Allergy    ROS:  Positive for pain  ROS     PE:  There were no vitals filed for this visit.  General: Pleasant, no distress  HEENT: NC/ AT. PERRLA  CV: Radial pulses intact  Pulm: No distress  Ext: No edema    Physical Exam     Neuromusculoskeletal:  Head: NC, AT. PERRLA  Neck: Intact range of motions,  Shoulder: Intact range of motion  Lumbar: Intact range of motion  Hip: Intact range of motion  SI: Level  Knee: Intact range of motion  Strength: 5/5 globally   Sensory: Grossly intact   Skin: No bruising, erythema  Gait: Normal        Impression:  R hand pain  Relevant History  BMI 34.02  Anxiety  Depression  DMII (HgbA1c 11.6)    Assessment:  R hand mild osteoarthritis  R carpal tunnel and cubital tunnel syndrome     Plan:  Discussed options  Imaging/ relevant records viewed/ reviewed/ discussed  Imaging results viewed and reviewed  (noted above)/ reviewed with patient   reviewed  Cont lyrica - can consider increasing to 150 mg BID  Mobic rx  Re-eval after  Cont care with orthopedic surgery        Prescribed medications:  1. None    The impression and plan were discussed and explained in detail. All the questions were answered. Education was provided accordingly.     The appropriate use of the medications, including storage, risks (including addiction) and potential sides effects were explained and discussed.     Follow-up:  Pt to schedule     Nesha Rios MD

## 2024-12-06 ENCOUNTER — INFUSION (OUTPATIENT)
Dept: INFUSION THERAPY | Facility: HOSPITAL | Age: 74
End: 2024-12-06
Attending: NURSE PRACTITIONER
Payer: MEDICARE

## 2024-12-06 VITALS
HEIGHT: 62 IN | BODY MASS INDEX: 34.35 KG/M2 | TEMPERATURE: 98 F | WEIGHT: 186.69 LBS | OXYGEN SATURATION: 93 % | SYSTOLIC BLOOD PRESSURE: 134 MMHG | RESPIRATION RATE: 16 BRPM | HEART RATE: 64 BPM | DIASTOLIC BLOOD PRESSURE: 65 MMHG

## 2024-12-06 DIAGNOSIS — D50.8 IRON DEFICIENCY ANEMIA SECONDARY TO INADEQUATE DIETARY IRON INTAKE: Primary | ICD-10-CM

## 2024-12-06 PROCEDURE — 25000003 PHARM REV CODE 250: Performed by: NURSE PRACTITIONER

## 2024-12-06 PROCEDURE — 63600175 PHARM REV CODE 636 W HCPCS: Performed by: NURSE PRACTITIONER

## 2024-12-06 PROCEDURE — A4216 STERILE WATER/SALINE, 10 ML: HCPCS | Performed by: NURSE PRACTITIONER

## 2024-12-06 PROCEDURE — 96365 THER/PROPH/DIAG IV INF INIT: CPT

## 2024-12-06 RX ORDER — DIPHENHYDRAMINE HYDROCHLORIDE 50 MG/ML
50 INJECTION INTRAMUSCULAR; INTRAVENOUS ONCE AS NEEDED
OUTPATIENT
Start: 2024-12-13

## 2024-12-06 RX ORDER — SODIUM CHLORIDE 0.9 % (FLUSH) 0.9 %
10 SYRINGE (ML) INJECTION
Status: ACTIVE | OUTPATIENT
Start: 2024-12-06

## 2024-12-06 RX ORDER — EPINEPHRINE 0.3 MG/.3ML
0.3 INJECTION SUBCUTANEOUS ONCE AS NEEDED
OUTPATIENT
Start: 2024-12-13

## 2024-12-06 RX ADMIN — Medication 10 ML: at 11:12

## 2024-12-06 RX ADMIN — SODIUM CHLORIDE 125 MG: 9 INJECTION, SOLUTION INTRAVENOUS at 10:12

## 2024-12-06 RX ADMIN — Medication 10 ML: at 10:12

## 2024-12-06 NOTE — PLAN OF CARE
Problem: Adult Inpatient Plan of Care  Goal: Optimal Comfort and Wellbeing  Outcome: Progressing  Intervention: Provide Person-Centered Care  Flowsheets (Taken 12/6/2024 1058)  Trust Relationship/Rapport:   care explained   choices provided   emotional support provided   empathic listening provided   questions answered   questions encouraged   reassurance provided   thoughts/feelings acknowledged

## 2024-12-09 ENCOUNTER — TELEPHONE (OUTPATIENT)
Dept: FAMILY MEDICINE | Facility: CLINIC | Age: 74
End: 2024-12-09
Payer: MEDICARE

## 2024-12-09 NOTE — TELEPHONE ENCOUNTER
Please call daughter Ana letting her know it is time for Ms. Heath to have A1c check (NF labs) to check improvement on DM to see if she can have surgery. Thanks, Blanca

## 2024-12-10 NOTE — TELEPHONE ENCOUNTER
Spoke to pt daughter, Ana and informed of pt needing to get A1c done. She verbalized understanding and will take pt to have done.

## 2024-12-13 ENCOUNTER — INFUSION (OUTPATIENT)
Dept: INFUSION THERAPY | Facility: HOSPITAL | Age: 74
End: 2024-12-13
Attending: NURSE PRACTITIONER
Payer: MEDICARE

## 2024-12-13 VITALS
HEIGHT: 62 IN | WEIGHT: 186.31 LBS | DIASTOLIC BLOOD PRESSURE: 63 MMHG | HEART RATE: 64 BPM | OXYGEN SATURATION: 95 % | BODY MASS INDEX: 34.29 KG/M2 | RESPIRATION RATE: 17 BRPM | TEMPERATURE: 98 F | SYSTOLIC BLOOD PRESSURE: 133 MMHG

## 2024-12-13 DIAGNOSIS — D50.8 IRON DEFICIENCY ANEMIA SECONDARY TO INADEQUATE DIETARY IRON INTAKE: Primary | ICD-10-CM

## 2024-12-13 PROCEDURE — 63600175 PHARM REV CODE 636 W HCPCS: Performed by: NURSE PRACTITIONER

## 2024-12-13 PROCEDURE — 25000003 PHARM REV CODE 250: Performed by: NURSE PRACTITIONER

## 2024-12-13 PROCEDURE — 96365 THER/PROPH/DIAG IV INF INIT: CPT

## 2024-12-13 RX ORDER — EPINEPHRINE 0.3 MG/.3ML
0.3 INJECTION SUBCUTANEOUS ONCE AS NEEDED
OUTPATIENT
Start: 2024-12-20

## 2024-12-13 RX ORDER — DIPHENHYDRAMINE HYDROCHLORIDE 50 MG/ML
50 INJECTION INTRAMUSCULAR; INTRAVENOUS ONCE AS NEEDED
OUTPATIENT
Start: 2024-12-20

## 2024-12-13 RX ADMIN — SODIUM CHLORIDE 125 MG: 9 INJECTION, SOLUTION INTRAVENOUS at 11:12

## 2024-12-13 NOTE — PLAN OF CARE
Problem: Adult Inpatient Plan of Care  Goal: Optimal Comfort and Wellbeing  Outcome: Progressing  Intervention: Provide Person-Centered Care  Flowsheets (Taken 12/13/2024 1115)  Trust Relationship/Rapport:   care explained   choices provided   emotional support provided   empathic listening provided   questions answered   questions encouraged   reassurance provided   thoughts/feelings acknowledged

## 2024-12-16 ENCOUNTER — TELEPHONE (OUTPATIENT)
Dept: FAMILY MEDICINE | Facility: CLINIC | Age: 74
End: 2024-12-16
Payer: MEDICARE

## 2024-12-16 NOTE — TELEPHONE ENCOUNTER
Pt requesting same day appt for URI. No appt available, offered tomorrow. Pt declined. Pt will report to UC

## 2024-12-16 NOTE — TELEPHONE ENCOUNTER
----- Message from Esme sent at 12/16/2024 10:44 AM CST -----  Regarding: Same day appt request  Contact: pt  Type:  Same Day Appointment Request    Caller is requesting a same day appointment.  Caller declined first available appointment listed below.      Name of Caller:pt    When is the first available appointment? tomorrow    Symptoms: upper respiratory infection    Would the PT rather a call back or a response via [facility]? Call back    Best Call Back Number:[phone]

## 2024-12-17 ENCOUNTER — TELEPHONE (OUTPATIENT)
Dept: FAMILY MEDICINE | Facility: CLINIC | Age: 74
End: 2024-12-17
Payer: MEDICARE

## 2024-12-17 DIAGNOSIS — R05.8 COUGH PRODUCTIVE OF PURULENT SPUTUM: Primary | ICD-10-CM

## 2024-12-17 NOTE — TELEPHONE ENCOUNTER
Spoke with pt.   Pt c/o URI s/s Sinus congestion, cough,   Pt seen at Urgent Care yesterday, prescribed nasal saline.   Pt requesting same day appt, NP booked. Negative for covid and flu.   Pt requesting RX

## 2024-12-18 RX ORDER — PROMETHAZINE HYDROCHLORIDE AND DEXTROMETHORPHAN HYDROBROMIDE 6.25; 15 MG/5ML; MG/5ML
5 SYRUP ORAL EVERY 4 HOURS PRN
Qty: 473 ML | Refills: 0 | Status: SHIPPED | OUTPATIENT
Start: 2024-12-18

## 2024-12-18 RX ORDER — AZITHROMYCIN 250 MG/1
TABLET, FILM COATED ORAL
Qty: 6 TABLET | Refills: 0 | Status: SHIPPED | OUTPATIENT
Start: 2024-12-18 | End: 2024-12-23

## 2024-12-18 NOTE — TELEPHONE ENCOUNTER
----- Message from Alma sent at 12/18/2024 12:02 PM CST -----  Contact: pt  Type:  Needs Medical Advice    Who Called:  pt    Symptoms (please be specific): sinus pressure & congestion, cough, sore throat     How long has patient had these symptoms:   about a week    Pharmacy name and phone #:       [id number] - [place], [place] - [address]  [address]  [place] [address]  Phone: [phone] Fax: [phone]    Would the patient rather a call back or a response via [facility]?  Call back    Best Call Back Number: [phone]    Additional Information: checking status of whether Ronald called anything in for her    Please call to advise    Thanks

## 2024-12-18 NOTE — TELEPHONE ENCOUNTER
Spoke with pt, advised medications sent to pharmacy per NP. Pt very grateful. Labs and f/u scheduled later this month.

## 2024-12-18 NOTE — TELEPHONE ENCOUNTER
Please tell pt I sent abx and cough med to [facility]. Needs non fasting labs that were ordered 9/23 and needs appt with NP. Thanks, Blanca

## 2024-12-20 ENCOUNTER — LAB VISIT (OUTPATIENT)
Dept: LAB | Facility: HOSPITAL | Age: 74
End: 2024-12-20
Attending: NURSE PRACTITIONER
Payer: MEDICARE

## 2024-12-20 ENCOUNTER — INFUSION (OUTPATIENT)
Dept: INFUSION THERAPY | Facility: HOSPITAL | Age: 74
End: 2024-12-20
Attending: NURSE PRACTITIONER
Payer: MEDICARE

## 2024-12-20 VITALS
TEMPERATURE: 98 F | DIASTOLIC BLOOD PRESSURE: 55 MMHG | HEART RATE: 65 BPM | SYSTOLIC BLOOD PRESSURE: 114 MMHG | HEIGHT: 62 IN | OXYGEN SATURATION: 95 % | BODY MASS INDEX: 34.29 KG/M2 | WEIGHT: 186.31 LBS

## 2024-12-20 DIAGNOSIS — E11.59 HYPERTENSION ASSOCIATED WITH DIABETES: ICD-10-CM

## 2024-12-20 DIAGNOSIS — E11.9 TYPE 2 DIABETES MELLITUS WITHOUT COMPLICATION: ICD-10-CM

## 2024-12-20 DIAGNOSIS — D50.8 IRON DEFICIENCY ANEMIA SECONDARY TO INADEQUATE DIETARY IRON INTAKE: Primary | ICD-10-CM

## 2024-12-20 DIAGNOSIS — I15.2 HYPERTENSION ASSOCIATED WITH DIABETES: ICD-10-CM

## 2024-12-20 LAB
ALBUMIN SERPL BCP-MCNC: 3.7 G/DL (ref 3.5–5.2)
ALBUMIN/CREAT UR: 555.3 UG/MG (ref 0–30)
ALP SERPL-CCNC: 102 U/L (ref 40–150)
ALT SERPL W/O P-5'-P-CCNC: 24 U/L (ref 10–44)
ANION GAP SERPL CALC-SCNC: 11 MMOL/L (ref 8–16)
AST SERPL-CCNC: 20 U/L (ref 10–40)
BASOPHILS # BLD AUTO: 0.07 K/UL (ref 0–0.2)
BASOPHILS NFR BLD: 0.9 % (ref 0–1.9)
BILIRUB SERPL-MCNC: 0.3 MG/DL (ref 0.1–1)
BUN SERPL-MCNC: 8 MG/DL (ref 8–23)
CALCIUM SERPL-MCNC: 9.4 MG/DL (ref 8.7–10.5)
CHLORIDE SERPL-SCNC: 96 MMOL/L (ref 95–110)
CO2 SERPL-SCNC: 26 MMOL/L (ref 23–29)
CREAT SERPL-MCNC: 0.9 MG/DL (ref 0.5–1.4)
CREAT UR-MCNC: 47 MG/DL (ref 15–325)
DIFFERENTIAL METHOD BLD: ABNORMAL
EOSINOPHIL # BLD AUTO: 0.2 K/UL (ref 0–0.5)
EOSINOPHIL NFR BLD: 2.9 % (ref 0–8)
ERYTHROCYTE [DISTWIDTH] IN BLOOD BY AUTOMATED COUNT: 16.4 % (ref 11.5–14.5)
EST. GFR  (NO RACE VARIABLE): >60 ML/MIN/1.73 M^2
ESTIMATED AVG GLUCOSE: 169 MG/DL (ref 68–131)
GLUCOSE SERPL-MCNC: 160 MG/DL (ref 70–110)
HBA1C MFR BLD: 7.5 % (ref 4–5.6)
HCT VFR BLD AUTO: 42.5 % (ref 37–48.5)
HGB BLD-MCNC: 14.2 G/DL (ref 12–16)
IMM GRANULOCYTES # BLD AUTO: 0.05 K/UL (ref 0–0.04)
IMM GRANULOCYTES NFR BLD AUTO: 0.6 % (ref 0–0.5)
LYMPHOCYTES # BLD AUTO: 2.2 K/UL (ref 1–4.8)
LYMPHOCYTES NFR BLD: 27.7 % (ref 18–48)
MCH RBC QN AUTO: 26 PG (ref 27–31)
MCHC RBC AUTO-ENTMCNC: 33.4 G/DL (ref 32–36)
MCV RBC AUTO: 78 FL (ref 82–98)
MICROALBUMIN UR DL<=1MG/L-MCNC: 261 UG/ML
MONOCYTES # BLD AUTO: 0.9 K/UL (ref 0.3–1)
MONOCYTES NFR BLD: 11.3 % (ref 4–15)
NEUTROPHILS # BLD AUTO: 4.5 K/UL (ref 1.8–7.7)
NEUTROPHILS NFR BLD: 56.6 % (ref 38–73)
NRBC BLD-RTO: 0 /100 WBC
PLATELET # BLD AUTO: 289 K/UL (ref 150–450)
PMV BLD AUTO: 9.6 FL (ref 9.2–12.9)
POTASSIUM SERPL-SCNC: 3.7 MMOL/L (ref 3.5–5.1)
PROT SERPL-MCNC: 8.5 G/DL (ref 6–8.4)
RBC # BLD AUTO: 5.47 M/UL (ref 4–5.4)
SODIUM SERPL-SCNC: 133 MMOL/L (ref 136–145)
WBC # BLD AUTO: 7.94 K/UL (ref 3.9–12.7)

## 2024-12-20 PROCEDURE — 83036 HEMOGLOBIN GLYCOSYLATED A1C: CPT | Performed by: NURSE PRACTITIONER

## 2024-12-20 PROCEDURE — 85025 COMPLETE CBC W/AUTO DIFF WBC: CPT | Performed by: NURSE PRACTITIONER

## 2024-12-20 PROCEDURE — 82043 UR ALBUMIN QUANTITATIVE: CPT | Performed by: NURSE PRACTITIONER

## 2024-12-20 PROCEDURE — 63600175 PHARM REV CODE 636 W HCPCS: Performed by: NURSE PRACTITIONER

## 2024-12-20 PROCEDURE — 36415 COLL VENOUS BLD VENIPUNCTURE: CPT | Performed by: NURSE PRACTITIONER

## 2024-12-20 PROCEDURE — 25000003 PHARM REV CODE 250: Performed by: NURSE PRACTITIONER

## 2024-12-20 PROCEDURE — 80053 COMPREHEN METABOLIC PANEL: CPT | Performed by: NURSE PRACTITIONER

## 2024-12-20 RX ORDER — DIPHENHYDRAMINE HYDROCHLORIDE 50 MG/ML
50 INJECTION INTRAMUSCULAR; INTRAVENOUS ONCE AS NEEDED
OUTPATIENT
Start: 2024-12-27

## 2024-12-20 RX ORDER — EPINEPHRINE 0.3 MG/.3ML
0.3 INJECTION SUBCUTANEOUS ONCE AS NEEDED
OUTPATIENT
Start: 2024-12-27

## 2024-12-20 RX ADMIN — SODIUM CHLORIDE 125 MG: 9 INJECTION, SOLUTION INTRAVENOUS at 11:12

## 2024-12-20 NOTE — PLAN OF CARE
Problem: Adult Inpatient Plan of Care  Goal: Optimal Comfort and Wellbeing  Outcome: Progressing  Intervention: Provide Person-Centered Care  Flowsheets (Taken 12/20/2024 1115)  Trust Relationship/Rapport:   care explained   thoughts/feelings acknowledged   choices provided   emotional support provided   empathic listening provided   questions answered   questions encouraged   reassurance provided

## 2024-12-27 ENCOUNTER — INFUSION (OUTPATIENT)
Dept: INFUSION THERAPY | Facility: HOSPITAL | Age: 74
End: 2024-12-27
Attending: NURSE PRACTITIONER
Payer: MEDICARE

## 2024-12-27 VITALS
SYSTOLIC BLOOD PRESSURE: 142 MMHG | HEART RATE: 68 BPM | DIASTOLIC BLOOD PRESSURE: 65 MMHG | WEIGHT: 186.5 LBS | TEMPERATURE: 98 F | BODY MASS INDEX: 34.32 KG/M2 | OXYGEN SATURATION: 98 % | HEIGHT: 62 IN

## 2024-12-27 DIAGNOSIS — D50.8 IRON DEFICIENCY ANEMIA SECONDARY TO INADEQUATE DIETARY IRON INTAKE: Primary | ICD-10-CM

## 2024-12-27 PROCEDURE — 96365 THER/PROPH/DIAG IV INF INIT: CPT

## 2024-12-27 PROCEDURE — 63600175 PHARM REV CODE 636 W HCPCS: Performed by: NURSE PRACTITIONER

## 2024-12-27 PROCEDURE — 25000003 PHARM REV CODE 250: Performed by: NURSE PRACTITIONER

## 2024-12-27 RX ORDER — EPINEPHRINE 0.3 MG/.3ML
0.3 INJECTION SUBCUTANEOUS ONCE AS NEEDED
OUTPATIENT
Start: 2024-12-27

## 2024-12-27 RX ORDER — DIPHENHYDRAMINE HYDROCHLORIDE 50 MG/ML
50 INJECTION INTRAMUSCULAR; INTRAVENOUS ONCE AS NEEDED
OUTPATIENT
Start: 2024-12-27

## 2024-12-27 RX ADMIN — SODIUM CHLORIDE 125 MG: 9 INJECTION, SOLUTION INTRAVENOUS at 10:12

## 2024-12-27 NOTE — PLAN OF CARE
Problem: Adult Inpatient Plan of Care  Goal: Optimal Comfort and Wellbeing  Outcome: Progressing  Intervention: Provide Person-Centered Care  Flowsheets (Taken 12/27/2024 1045)  Trust Relationship/Rapport:   care explained   choices provided   emotional support provided   empathic listening provided   questions answered   questions encouraged   reassurance provided   thoughts/feelings acknowledged

## 2024-12-30 ENCOUNTER — OFFICE VISIT (OUTPATIENT)
Dept: FAMILY MEDICINE | Facility: CLINIC | Age: 74
End: 2024-12-30
Payer: MEDICARE

## 2024-12-30 VITALS
WEIGHT: 190.63 LBS | SYSTOLIC BLOOD PRESSURE: 130 MMHG | HEIGHT: 62 IN | HEART RATE: 67 BPM | BODY MASS INDEX: 35.08 KG/M2 | DIASTOLIC BLOOD PRESSURE: 78 MMHG | OXYGEN SATURATION: 98 %

## 2024-12-30 DIAGNOSIS — I15.2 HYPERTENSION ASSOCIATED WITH DIABETES: ICD-10-CM

## 2024-12-30 DIAGNOSIS — Z12.31 BREAST CANCER SCREENING BY MAMMOGRAM: ICD-10-CM

## 2024-12-30 DIAGNOSIS — Z01.818 PRE-OP EXAM: Primary | ICD-10-CM

## 2024-12-30 DIAGNOSIS — E11.59 HYPERTENSION ASSOCIATED WITH DIABETES: ICD-10-CM

## 2024-12-30 DIAGNOSIS — M79.641 RIGHT HAND PAIN: ICD-10-CM

## 2024-12-30 PROCEDURE — 3066F NEPHROPATHY DOC TX: CPT | Mod: CPTII,S$GLB,, | Performed by: NURSE PRACTITIONER

## 2024-12-30 PROCEDURE — 1101F PT FALLS ASSESS-DOCD LE1/YR: CPT | Mod: CPTII,S$GLB,, | Performed by: NURSE PRACTITIONER

## 2024-12-30 PROCEDURE — 3008F BODY MASS INDEX DOCD: CPT | Mod: CPTII,S$GLB,, | Performed by: NURSE PRACTITIONER

## 2024-12-30 PROCEDURE — 1126F AMNT PAIN NOTED NONE PRSNT: CPT | Mod: CPTII,S$GLB,, | Performed by: NURSE PRACTITIONER

## 2024-12-30 PROCEDURE — 1157F ADVNC CARE PLAN IN RCRD: CPT | Mod: CPTII,S$GLB,, | Performed by: NURSE PRACTITIONER

## 2024-12-30 PROCEDURE — 1160F RVW MEDS BY RX/DR IN RCRD: CPT | Mod: CPTII,S$GLB,, | Performed by: NURSE PRACTITIONER

## 2024-12-30 PROCEDURE — 3078F DIAST BP <80 MM HG: CPT | Mod: CPTII,S$GLB,, | Performed by: NURSE PRACTITIONER

## 2024-12-30 PROCEDURE — 3062F POS MACROALBUMINURIA REV: CPT | Mod: CPTII,S$GLB,, | Performed by: NURSE PRACTITIONER

## 2024-12-30 PROCEDURE — 2023F DILAT RTA XM W/O RTNOPTHY: CPT | Mod: CPTII,S$GLB,, | Performed by: NURSE PRACTITIONER

## 2024-12-30 PROCEDURE — 1159F MED LIST DOCD IN RCRD: CPT | Mod: CPTII,S$GLB,, | Performed by: NURSE PRACTITIONER

## 2024-12-30 PROCEDURE — G2211 COMPLEX E/M VISIT ADD ON: HCPCS | Mod: S$GLB,,, | Performed by: NURSE PRACTITIONER

## 2024-12-30 PROCEDURE — 3075F SYST BP GE 130 - 139MM HG: CPT | Mod: CPTII,S$GLB,, | Performed by: NURSE PRACTITIONER

## 2024-12-30 PROCEDURE — 3288F FALL RISK ASSESSMENT DOCD: CPT | Mod: CPTII,S$GLB,, | Performed by: NURSE PRACTITIONER

## 2024-12-30 PROCEDURE — 3051F HG A1C>EQUAL 7.0%<8.0%: CPT | Mod: CPTII,S$GLB,, | Performed by: NURSE PRACTITIONER

## 2024-12-30 PROCEDURE — 99999 PR PBB SHADOW E&M-EST. PATIENT-LVL IV: CPT | Mod: PBBFAC,,, | Performed by: NURSE PRACTITIONER

## 2024-12-30 PROCEDURE — 99214 OFFICE O/P EST MOD 30 MIN: CPT | Mod: S$GLB,,, | Performed by: NURSE PRACTITIONER

## 2024-12-30 RX ORDER — INSULIN GLARGINE 100 [IU]/ML
25 INJECTION, SOLUTION SUBCUTANEOUS NIGHTLY
Start: 2024-12-30 | End: 2025-01-03 | Stop reason: SDUPTHER

## 2024-12-30 RX ORDER — ASPIRIN 325 MG
50000 TABLET, DELAYED RELEASE (ENTERIC COATED) ORAL WEEKLY
COMMUNITY
End: 2025-01-03 | Stop reason: SDUPTHER

## 2024-12-30 NOTE — PROGRESS NOTES
Subjective:       Patient ID: Argelia Pyle is a 74 y.o. female.    Chief Complaint:   History of Present Illness    CHIEF COMPLAINT:  Patient presents today for pre-op clearance, medication management and follow-up on surgery plans.    SURGICAL HISTORY:  She was denied surgery 3 mo ago d/t A1c of 11.6% which now is 7.5% d/t medication compliance.   She is awaiting surgery on right wrist and hand, including finger involvement. A nerve conduction study is scheduled for the 8th prior to surgical intervention. She reports severe pain for three days following previous nerve conduction testing with significant functional limitation of the hand during that period. Sx of right carpal tunnel release and cubital tunnel release will be scheduled with Dr. Gale at [facility] in [place] once cleared by PCP and EMG re-done.    MEDICATIONS:  She currently uses [facility] for pill packaging. Metoprolol was not included in pill pack and had to be picked up separately from [facility] along with insulin and Jardiance. She takes Buspirone for anxiety and uses an inhaler occasionally. She denies taking ibuprofen due to GI issues. She previously took Vitamin D 50,000 units weekly but has discontinued it.    DIABETES:  Her diabetes is better controlled. She takes 20 units of insulin at night.    IRON DEFICIENCY:  She received an iron transfusion recently, with the last one administered on Friday. She reports a family history of iron deficiency, including her daughter and grandchildren.        Past Medical History:   Diagnosis Date    Anxiety     Arthritis     Chronic sinusitis     Diabetes mellitus     Hypertension     IVONNE (iron deficiency anemia)        Past Surgical History:   Procedure Laterality Date    BREAST BIOPSY      BREAST LUMPECTOMY Left     BUNIONECTOMY Bilateral     COLONOSCOPY      ESOPHAGOGASTRODUODENOSCOPY      ETHMOIDECTOMY Bilateral 02/07/2019    Procedure: ETHMOIDECTOMY;  Surgeon: Michael Grubbs MD;   Location: [facility] OR;  Service: ENT;  Laterality: Bilateral;  endoscopic anterior and posterior    FUNCTIONAL ENDOSCOPIC SINUS SURGERY (FESS) N/A 2019    Procedure: FESS (FUNCTIONAL ENDOSCOPIC SINUS SURGERY);  Surgeon: Michael Grubbs MD;  Location: [facility] OR;  Service: ENT;  Laterality: N/A;    FUNCTIONAL ENDOSCOPIC SINUS SURGERY (FESS) Bilateral 2019    Procedure: FESS (FUNCTIONAL ENDOSCOPIC SINUS SURGERY);  Surgeon: Michael Grubbs MD;  Location: [facility] OR;  Service: ENT;  Laterality: Bilateral;    MAXILLARY ANTROSTOMY Bilateral 2019    Procedure: MAXILLARY ANTROSTOMY;  Surgeon: Michael Grubbs MD;  Location: [facility] OR;  Service: ENT;  Laterality: Bilateral;    NASAL SEPTOPLASTY Bilateral 2019    Procedure: SEPTOPLASTY, NOSE;  Surgeon: Michael Grubbs MD;  Location: [facility] OR;  Service: ENT;  Laterality: Bilateral;    NASAL TURBINATE REDUCTION Bilateral 2019    Procedure: REDUCTION, NASAL TURBINATE;  Surgeon: Michael Grubbs MD;  Location: [facility] OR;  Service: ENT;  Laterality: Bilateral;    SINUS SURGERY      x4    TUBAL LIGATION      [facility]        Social History     Socioeconomic History    Marital status:    Tobacco Use    Smoking status: Former     Current packs/day: 0.00     Average packs/day: 0.8 packs/day for 25.0 years (18.8 ttl pk-yrs)     Types: Cigarettes     Start date: 1974     Quit date: 1999     Years since quittin.7    Smokeless tobacco: Never   Substance and Sexual Activity    Alcohol use: Yes     Comment: social    Drug use: Not Currently     Types: Other-see comments     Comment: tramadol as prescribed    Sexual activity: Not Currently     Birth control/protection: See Surgical Hx       Family History   Problem Relation Name Age of Onset    Leukemia Son      Breast cancer Neg Hx         Review of patient's allergies indicates:   Allergen Reactions    Cefuroxime Rash    Ketorolac tromethamine  Other (See Comments)     Other reaction(s): Burning    Moxifloxacin Other (See Comments)     Heart racing.    Codeine Itching    Famotidine-ca carb-mag hydrox Hives    Ketorolac Other (See Comments)    Prevacid [lansoprazole] Itching          Current Outpatient Medications:     albuterol (PROVENTIL/VENTOLIN HFA) 90 mcg/actuation inhaler, Inhale 2 puffs into the lungs every 4 (four) hours as needed for Wheezing or Shortness of Breath., Disp: 18 g, Rfl: 11    amLODIPine (NORVASC) 10 MG tablet, Take 1 tablet (10 mg total) by mouth every evening., Disp: 90 tablet, Rfl: 3    aspirin (ECOTRIN) 81 MG EC tablet, Take 1 tablet (81 mg total) by mouth every evening., Disp: 90 tablet, Rfl: 3    blood sugar diagnostic Strp, To check BG 3 times daily, to use with insurance preferred meter, Disp: 200 strip, Rfl: 11    blood-glucose meter kit, To check BG 3 times daily, to use with insurance preferred meter, Disp: 1 each, Rfl: 0    busPIRone (BUSPAR) 30 MG Tab, Take 1 tablet (30 mg total) by mouth 2 (two) times daily., Disp: 180 tablet, Rfl: 3    cholecalciferol, vitamin D3, 1,250 mcg (50,000 unit) capsule, Take 50,000 Units by mouth once a week., Disp: , Rfl:     empagliflozin (JARDIANCE) 25 mg tablet, Take 1 tablet (25 mg total) by mouth every morning., Disp: 30 tablet, Rfl: 11    fluticasone propionate (FLONASE) 50 mcg/actuation nasal spray, 1 spray (50 mcg total) by Each Nostril route once daily., Disp: 15.8 mL, Rfl: 0    glipiZIDE (GLUCOTROL) 10 MG tablet, Take 1 tablet (10 mg total) by mouth 2 (two) times daily before meals., Disp: 180 tablet, Rfl: 3    insulin glargine U-100, Lantus, (LANTUS SOLOSTAR U-100 INSULIN) 100 unit/mL (3 mL) InPn pen, Inject 25 Units into the skin every evening., Disp: , Rfl:     lancets Misc, To check BG 3 times daily, to use with insurance preferred meter, Disp: 200 each, Rfl: 11    losartan-hydrochlorothiazide 100-25 mg (HYZAAR) 100-25 mg per tablet, Take 1 tablet by mouth every morning., Disp:  "90 tablet, Rfl: 3    metFORMIN (GLUCOPHAGE) 1000 MG tablet, Take 1 tablet (1,000 mg total) by mouth 2 (two) times daily., Disp: 60 tablet, Rfl: 11    metoprolol tartrate (LOPRESSOR) 50 MG tablet, Take 1 tablet by mouth twice daily, Disp: 180 tablet, Rfl: 3    montelukast (SINGULAIR) 10 mg tablet, Take 10 mg by mouth every evening., Disp: , Rfl:     pantoprazole (PROTONIX) 20 MG tablet, Take 1 tablet (20 mg total) by mouth every morning., Disp: 90 tablet, Rfl: 3    paroxetine (PAXIL) 20 MG tablet, Take 1 tablet (20 mg total) by mouth every evening., Disp: 90 tablet, Rfl: 3    pen needle, diabetic (BD GIUSEPPE 2ND GEN PEN NEEDLE) 32 gauge x 5/32" Ndle, 1 Device by Misc.(Non-Drug; Combo Route) route nightly., Disp: 100 each, Rfl: 11    pregabalin (LYRICA) 75 MG capsule, Take 1 capsule (75 mg total) by mouth 2 (two) times daily., Disp: 180 capsule, Rfl: 1    simvastatin (ZOCOR) 20 MG tablet, Take 1 tablet (20 mg total) by mouth nightly., Disp: 90 tablet, Rfl: 3    traZODone (DESYREL) 50 MG tablet, Take 1 tablet (50 mg total) by mouth every evening., Disp: , Rfl:     Follow-up      Review of Systems   Constitutional: Negative.    HENT: Negative.     Eyes: Negative.    Respiratory: Negative.     Cardiovascular: Negative.    Gastrointestinal: Negative.    Endocrine: Negative.    Genitourinary: Negative.    Musculoskeletal:         Right hand/arm pain   Skin: Negative.    Allergic/Immunologic: Negative.    Neurological: Negative.    Hematological: Negative.    Psychiatric/Behavioral: Negative.               Objective:      Physical Exam  Vitals and nursing note reviewed.   Constitutional:       General: She is not in acute distress.     Appearance: Normal appearance. She is well-developed. She is obese. She is not ill-appearing.   HENT:      Head: Normocephalic.   Eyes:      Conjunctiva/sclera: Conjunctivae normal.   Neck:      Thyroid: No thyromegaly.   Cardiovascular:      Rate and Rhythm: Normal rate and regular rhythm.      " Heart sounds: Normal heart sounds. No murmur heard.  Pulmonary:      Effort: Pulmonary effort is normal.      Breath sounds: Normal breath sounds. No wheezing or rales.   Musculoskeletal:         General: Normal range of motion.      Cervical back: Normal range of motion.      Right lower leg: No edema.      Left lower leg: No edema.   Skin:     General: Skin is warm and dry.   Neurological:      Mental Status: She is alert and oriented to person, place, and time. Mental status is at baseline.   Psychiatric:         Mood and Affect: Mood normal.         Behavior: Behavior normal.         Thought Content: Thought content normal.         Judgment: Judgment normal.         Assessment:      1. Pre-op exam    2. Breast cancer screening by mammogram    3. Hypertension associated with diabetes    4. Right hand pain        Plan:    1- mammo 3/14/25  2- EMG 1/8/25  3- will send pre-op clearance  4- NP to call [facility] r/t not accepting her ins [facility] and [facility].  5- RTC 6 weeks  6- inc lantus to 25 units nightly  -----------  ..Visit today included increased complexity associated with the care of the episodic problem DM addressed and managing the longitudinal care of the patient due to the serious and/or complex managed problem(s) DM.       Pre-op exam  -     SCHEDULED EKG 12-LEAD (to Muse); Future  -     Urinalysis, Reflex to Urine Culture Urine, Clean Catch; Future; Expected date: 12/31/2024    Breast cancer screening by mammogram  -     Mammo Digital Screening Bilat w/ Lucius; Future; Expected date: 03/14/2025    Hypertension associated with diabetes  -     insulin glargine U-100, Lantus, (LANTUS SOLOSTAR U-100 INSULIN) 100 unit/mL (3 mL) InPn pen; Inject 25 Units into the skin every evening.    Right hand pain  -     SCHEDULED EKG 12-LEAD (to Muse); Future  -     Urinalysis, Reflex to Urine Culture Urine, Clean Catch; Future; Expected date: 12/31/2024        Risks, benefits, and  side effects were discussed with the patient. All questions were answered to the fullest satisfaction of the patient, and pt verbalized understanding and agreement to treatment plan. Pt was to call with any new or worsening symptoms, or present to the ER.        This note was generated with the assistance of ambient listening technology. Verbal consent was obtained by the patient and accompanying visitor(s) for the recording of patient appointment to facilitate this note. I attest to having reviewed and edited the generated note for accuracy, though some syntax or spelling errors may persist. Please contact the author of this note for any clarification.

## 2025-01-02 ENCOUNTER — TELEPHONE (OUTPATIENT)
Dept: FAMILY MEDICINE | Facility: CLINIC | Age: 75
End: 2025-01-02
Payer: MEDICARE

## 2025-01-02 NOTE — TELEPHONE ENCOUNTER
----- Message from Shamika sent at 1/2/2025  2:48 PM CST -----  Contact: self  Type: Needs Medical Advice  Who Called:  pt   Symptoms (please be specific):  sinus infection is so bad her face and ears are hurting made appt w/ENT but can't get in until next week call you call something in for her   How long has patient had these symptoms:  12/31/24 this past tuesday  Pharmacy name and phone #:    [id number] - [place], [place] - [address]  [address]  [place] [address]  Phone: [phone] Fax: [phone]  Best Call Back Number: [phone]  Additional Information: thanks states it always hits her the day after she sees you.

## 2025-01-03 ENCOUNTER — TELEPHONE (OUTPATIENT)
Dept: FAMILY MEDICINE | Facility: CLINIC | Age: 75
End: 2025-01-03
Payer: MEDICARE

## 2025-01-03 DIAGNOSIS — K21.9 GASTROESOPHAGEAL REFLUX DISEASE WITHOUT ESOPHAGITIS: ICD-10-CM

## 2025-01-03 DIAGNOSIS — E55.9 VITAMIN D DEFICIENCY: ICD-10-CM

## 2025-01-03 DIAGNOSIS — E11.69 TYPE 2 DIABETES MELLITUS WITH HYPERLIPIDEMIA: ICD-10-CM

## 2025-01-03 DIAGNOSIS — E11.59 HYPERTENSION ASSOCIATED WITH DIABETES: ICD-10-CM

## 2025-01-03 DIAGNOSIS — F32.A ANXIETY AND DEPRESSION: ICD-10-CM

## 2025-01-03 DIAGNOSIS — E78.5 TYPE 2 DIABETES MELLITUS WITH HYPERLIPIDEMIA: ICD-10-CM

## 2025-01-03 DIAGNOSIS — F51.01 PRIMARY INSOMNIA: ICD-10-CM

## 2025-01-03 DIAGNOSIS — M79.641 RIGHT HAND PAIN: ICD-10-CM

## 2025-01-03 DIAGNOSIS — A49.9 BACTERIAL INFECTION: ICD-10-CM

## 2025-01-03 DIAGNOSIS — I10 ESSENTIAL HYPERTENSION: ICD-10-CM

## 2025-01-03 DIAGNOSIS — R09.82 PND (POST-NASAL DRIP): ICD-10-CM

## 2025-01-03 DIAGNOSIS — F41.9 ANXIETY AND DEPRESSION: ICD-10-CM

## 2025-01-03 DIAGNOSIS — M79.2 NERVE PAIN: ICD-10-CM

## 2025-01-03 DIAGNOSIS — R06.2 WHEEZES: ICD-10-CM

## 2025-01-03 DIAGNOSIS — I15.2 HYPERTENSION ASSOCIATED WITH DIABETES: ICD-10-CM

## 2025-01-03 DIAGNOSIS — J30.2 SEASONAL ALLERGIES: ICD-10-CM

## 2025-01-03 DIAGNOSIS — Z01.818 PRE-OP EXAM: Primary | ICD-10-CM

## 2025-01-03 RX ORDER — ASPIRIN 325 MG
50000 TABLET, DELAYED RELEASE (ENTERIC COATED) ORAL WEEKLY
Qty: 90 CAPSULE | Refills: 3 | Status: SHIPPED | OUTPATIENT
Start: 2025-01-03

## 2025-01-03 RX ORDER — AMLODIPINE BESYLATE 10 MG/1
10 TABLET ORAL NIGHTLY
Qty: 90 TABLET | Refills: 3 | Status: SHIPPED | OUTPATIENT
Start: 2025-01-03

## 2025-01-03 RX ORDER — PANTOPRAZOLE SODIUM 20 MG/1
20 TABLET, DELAYED RELEASE ORAL EVERY MORNING
Qty: 90 TABLET | Refills: 3 | Status: SHIPPED | OUTPATIENT
Start: 2025-01-03

## 2025-01-03 RX ORDER — PEN NEEDLE, DIABETIC 30 GX3/16"
1 NEEDLE, DISPOSABLE MISCELLANEOUS NIGHTLY
Qty: 100 EACH | Refills: 11 | Status: SHIPPED | OUTPATIENT
Start: 2025-01-03

## 2025-01-03 RX ORDER — GLIPIZIDE 10 MG/1
10 TABLET ORAL
Qty: 180 TABLET | Refills: 3 | Status: SHIPPED | OUTPATIENT
Start: 2025-01-03

## 2025-01-03 RX ORDER — PREGABALIN 75 MG/1
75 CAPSULE ORAL 2 TIMES DAILY
Qty: 180 CAPSULE | Refills: 1 | Status: SHIPPED | OUTPATIENT
Start: 2025-01-03

## 2025-01-03 RX ORDER — INSULIN GLARGINE 100 [IU]/ML
25 INJECTION, SOLUTION SUBCUTANEOUS NIGHTLY
Qty: 7.5 ML | Refills: 11 | Status: SHIPPED | OUTPATIENT
Start: 2025-01-03 | End: 2026-01-03

## 2025-01-03 RX ORDER — METOPROLOL TARTRATE 50 MG/1
50 TABLET ORAL 2 TIMES DAILY
Qty: 180 TABLET | Refills: 3 | Status: SHIPPED | OUTPATIENT
Start: 2025-01-03

## 2025-01-03 RX ORDER — LOSARTAN POTASSIUM AND HYDROCHLOROTHIAZIDE 25; 100 MG/1; MG/1
1 TABLET ORAL EVERY MORNING
Qty: 90 TABLET | Refills: 3 | Status: SHIPPED | OUTPATIENT
Start: 2025-01-03

## 2025-01-03 RX ORDER — LANCETS
EACH MISCELLANEOUS
Qty: 200 EACH | Refills: 11 | Status: SHIPPED | OUTPATIENT
Start: 2025-01-03

## 2025-01-03 RX ORDER — MONTELUKAST SODIUM 10 MG/1
10 TABLET ORAL NIGHTLY
Qty: 90 TABLET | Refills: 3 | Status: SHIPPED | OUTPATIENT
Start: 2025-01-03

## 2025-01-03 RX ORDER — FLUTICASONE PROPIONATE 50 MCG
1 SPRAY, SUSPENSION (ML) NASAL DAILY
Qty: 15.8 ML | Refills: 11 | Status: SHIPPED | OUTPATIENT
Start: 2025-01-03

## 2025-01-03 RX ORDER — METFORMIN HYDROCHLORIDE 1000 MG/1
1000 TABLET ORAL 2 TIMES DAILY
Qty: 180 TABLET | Refills: 3 | Status: SHIPPED | OUTPATIENT
Start: 2025-01-03

## 2025-01-03 RX ORDER — ASPIRIN 81 MG/1
81 TABLET ORAL NIGHTLY
Qty: 90 TABLET | Refills: 3 | Status: SHIPPED | OUTPATIENT
Start: 2025-01-03

## 2025-01-03 RX ORDER — SIMVASTATIN 20 MG/1
20 TABLET, FILM COATED ORAL NIGHTLY
Qty: 90 TABLET | Refills: 3 | Status: SHIPPED | OUTPATIENT
Start: 2025-01-03

## 2025-01-03 RX ORDER — PAROXETINE HYDROCHLORIDE 20 MG/1
20 TABLET, FILM COATED ORAL NIGHTLY
Qty: 90 TABLET | Refills: 3 | Status: SHIPPED | OUTPATIENT
Start: 2025-01-03 | End: 2026-01-03

## 2025-01-03 RX ORDER — TRAZODONE HYDROCHLORIDE 50 MG/1
50 TABLET ORAL NIGHTLY
Qty: 90 TABLET | Refills: 3 | Status: SHIPPED | OUTPATIENT
Start: 2025-01-03 | End: 2026-01-03

## 2025-01-03 RX ORDER — ALBUTEROL SULFATE 90 UG/1
2 INHALANT RESPIRATORY (INHALATION) EVERY 4 HOURS PRN
Qty: 18 G | Refills: 11 | Status: SHIPPED | OUTPATIENT
Start: 2025-01-03

## 2025-01-03 RX ORDER — BUSPIRONE HYDROCHLORIDE 30 MG/1
30 TABLET ORAL 2 TIMES DAILY
Qty: 180 TABLET | Refills: 3 | Status: SHIPPED | OUTPATIENT
Start: 2025-01-03 | End: 2026-01-03

## 2025-01-03 NOTE — TELEPHONE ENCOUNTER
Please call pt and kevin letting them know [facility] accepts her ins [facility]. She also needs UA/EKG for pre-op clearance. Thanks, Blanca

## 2025-01-03 NOTE — TELEPHONE ENCOUNTER
Spoke to pt granddaughter and informed of pharmacy message. EKG and UA scheduled 1/9 per pt preference.

## 2025-01-03 NOTE — TELEPHONE ENCOUNTER
Spoke to Niyah at [facility] and they are no longer taking [employer]. Spoke Radha at [facility] with insurance accepted and will being [employer] system. Granddaughter number given and asked to make note pt has memory decline.

## 2025-01-03 NOTE — TELEPHONE ENCOUNTER
----- Message from Sonya Blackman NP sent at 12/31/2024  8:34 PM CST -----  Regarding: pre-op  Fax and add clearance. Needs EKG and UA  Sx of right carpal tunnel release and cubital tunnel release will be scheduled with Dr. Gale at [facility] in [place] once cleared by PCP and EMG re-done.    NP to call [facility] r/t not accepting her ins 1[facility] and [facility] offer.

## 2025-01-07 ENCOUNTER — OFFICE VISIT (OUTPATIENT)
Dept: OTOLARYNGOLOGY | Facility: CLINIC | Age: 75
End: 2025-01-07
Payer: MEDICARE

## 2025-01-07 VITALS — WEIGHT: 189.81 LBS | HEIGHT: 62 IN | BODY MASS INDEX: 34.93 KG/M2

## 2025-01-07 DIAGNOSIS — J32.9 CHRONIC SINUSITIS, UNSPECIFIED LOCATION: Primary | ICD-10-CM

## 2025-01-07 PROCEDURE — 87077 CULTURE AEROBIC IDENTIFY: CPT | Performed by: OTOLARYNGOLOGY

## 2025-01-07 PROCEDURE — 99214 OFFICE O/P EST MOD 30 MIN: CPT | Mod: S$GLB,,, | Performed by: OTOLARYNGOLOGY

## 2025-01-07 PROCEDURE — 99999 PR PBB SHADOW E&M-EST. PATIENT-LVL III: CPT | Mod: PBBFAC,,, | Performed by: OTOLARYNGOLOGY

## 2025-01-07 PROCEDURE — 1160F RVW MEDS BY RX/DR IN RCRD: CPT | Mod: CPTII,S$GLB,, | Performed by: OTOLARYNGOLOGY

## 2025-01-07 PROCEDURE — 1159F MED LIST DOCD IN RCRD: CPT | Mod: CPTII,S$GLB,, | Performed by: OTOLARYNGOLOGY

## 2025-01-07 PROCEDURE — 1125F AMNT PAIN NOTED PAIN PRSNT: CPT | Mod: CPTII,S$GLB,, | Performed by: OTOLARYNGOLOGY

## 2025-01-07 PROCEDURE — 1157F ADVNC CARE PLAN IN RCRD: CPT | Mod: CPTII,S$GLB,, | Performed by: OTOLARYNGOLOGY

## 2025-01-07 PROCEDURE — 87186 SC STD MICRODIL/AGAR DIL: CPT | Performed by: OTOLARYNGOLOGY

## 2025-01-07 PROCEDURE — 87070 CULTURE OTHR SPECIMN AEROBIC: CPT | Performed by: OTOLARYNGOLOGY

## 2025-01-07 PROCEDURE — 3008F BODY MASS INDEX DOCD: CPT | Mod: CPTII,S$GLB,, | Performed by: OTOLARYNGOLOGY

## 2025-01-07 RX ORDER — AZITHROMYCIN 250 MG/1
TABLET, FILM COATED ORAL
Qty: 14 TABLET | Refills: 0 | Status: SHIPPED | OUTPATIENT
Start: 2025-01-07

## 2025-01-07 NOTE — PROGRESS NOTES
Subjective:       Patient ID: Argelia Pyle is a 74 y.o. female.    Chief Complaint: Sinusitis (Pt c/o ongoing sinus infection for 3 weeks )       This patient tells me she has a severe sinus infection her history includes surgery in the past although she tells me Dr. Grubbs did that a couple of years ago but I see 2016 scans from [place] that include findings suggestive of sinus surgery.  The skin that was most recently done was not in 2019 in it shows postop condition with thickened bone and suggestion of surgery done a long time ago.  But everything looks pretty well opened.    She is allergic to Avelox she tells me but in her chart that is Avelox in cefuroxime as far as antibiotics go.          Objective:      ENT Physical Exam    So her nose looks pretty good I did not scope her today there maybe a role for that but I obtained a culture on the left side from the middle meatus and from the nasopharynx and we are sending that to the lab        Assessment:       1. Chronic sinusitis, unspecified location         Plan:           I think that is a role to start on antibiotics for her paranasal sinus pain that may well be infectious in origin although that is not clear from her exam that that is the case we are going to see if a culture results is helpful begin her on azithromycin she has a couple of antibiotic allergy categories and see her back if things do not do well.  She mentions that her daughter uses the portal for her but there maybe a role just a call her when the cultures come back next week.

## 2025-01-09 ENCOUNTER — HOSPITAL ENCOUNTER (OUTPATIENT)
Dept: CARDIOLOGY | Facility: HOSPITAL | Age: 75
Discharge: HOME | End: 2025-01-09
Attending: NURSE PRACTITIONER
Payer: MEDICARE

## 2025-01-09 DIAGNOSIS — Z01.818 PRE-OP EXAM: ICD-10-CM

## 2025-01-09 DIAGNOSIS — M79.641 RIGHT HAND PAIN: ICD-10-CM

## 2025-01-09 PROCEDURE — 93010 ELECTROCARDIOGRAM REPORT: CPT | Mod: ,,, | Performed by: INTERNAL MEDICINE

## 2025-01-09 PROCEDURE — 93005 ELECTROCARDIOGRAM TRACING: CPT

## 2025-01-10 ENCOUNTER — PATIENT MESSAGE (OUTPATIENT)
Dept: OTOLARYNGOLOGY | Facility: CLINIC | Age: 75
End: 2025-01-10
Payer: MEDICARE

## 2025-01-10 ENCOUNTER — TELEPHONE (OUTPATIENT)
Dept: OTOLARYNGOLOGY | Facility: CLINIC | Age: 75
End: 2025-01-10
Payer: MEDICARE

## 2025-01-10 ENCOUNTER — TELEPHONE (OUTPATIENT)
Dept: FAMILY MEDICINE | Facility: CLINIC | Age: 75
End: 2025-01-10
Payer: MEDICARE

## 2025-01-10 LAB
BACTERIA SPEC AEROBE CULT: ABNORMAL
OHS QRS DURATION: 82 MS
OHS QTC CALCULATION: 448 MS

## 2025-01-10 RX ORDER — SULFAMETHOXAZOLE AND TRIMETHOPRIM 800; 160 MG/1; MG/1
1 TABLET ORAL 2 TIMES DAILY
Qty: 42 TABLET | Refills: 0 | Status: SHIPPED | OUTPATIENT
Start: 2025-01-10 | End: 2025-01-31

## 2025-01-10 NOTE — PROGRESS NOTES
Please let the patient know that I sent in a different antibiotic she grew out staph that the azithromycin she has been put on will not cover and I sent her a more involved message on the portal and she can stop the Zithromax

## 2025-01-10 NOTE — TELEPHONE ENCOUNTER
Please call Granddaughter letting her know her results were negative and we are faxing medical clearance to Dr. Gale at [facility] today. Thanks, Blanca

## 2025-01-10 NOTE — PROGRESS NOTES
So I am sending this patient a message but let her know that the culture grew out staph and that is Zithromax isn't going to cover that I sent in a few weeks of Bactrim which has a sulfa drug and it does not look like she has not allergy to Bactrim even though she has multiple allergies.

## 2025-01-10 NOTE — TELEPHONE ENCOUNTER
----- Message from Dion Johnson MD sent at 1/10/2025  4:58 PM CST -----  Please let the patient know that I sent in a different antibiotic she grew out staph that the azithromycin she has been put on will not cover and I sent her a more involved message on the portal and she can stop the Zithromax

## 2025-01-13 ENCOUNTER — PATIENT MESSAGE (OUTPATIENT)
Dept: FAMILY MEDICINE | Facility: CLINIC | Age: 75
End: 2025-01-13
Payer: MEDICARE

## 2025-01-30 PROBLEM — M18.11 PRIMARY OSTEOARTHRITIS OF FIRST CARPOMETACARPAL JOINT OF RIGHT HAND: Status: ACTIVE | Noted: 2025-01-30

## 2025-01-30 PROBLEM — G56.01 CARPAL TUNNEL SYNDROME, RIGHT: Status: ACTIVE | Noted: 2025-01-30

## 2025-01-30 PROBLEM — G56.21 CUBITAL TUNNEL SYNDROME, RIGHT: Status: ACTIVE | Noted: 2025-01-30

## 2025-02-03 DIAGNOSIS — E78.5 TYPE 2 DIABETES MELLITUS WITH HYPERLIPIDEMIA: ICD-10-CM

## 2025-02-03 DIAGNOSIS — E11.69 TYPE 2 DIABETES MELLITUS WITH HYPERLIPIDEMIA: ICD-10-CM

## 2025-02-03 DIAGNOSIS — M79.2 NERVE PAIN: ICD-10-CM

## 2025-02-03 RX ORDER — PREGABALIN 75 MG/1
CAPSULE ORAL
Qty: 180 CAPSULE | Refills: 1 | Status: SHIPPED | OUTPATIENT
Start: 2025-02-03

## 2025-02-04 ENCOUNTER — TELEPHONE (OUTPATIENT)
Dept: FAMILY MEDICINE | Facility: CLINIC | Age: 75
End: 2025-02-04
Payer: MEDICARE

## 2025-02-04 RX ORDER — EPINEPHRINE 0.3 MG/.3ML
0.3 INJECTION SUBCUTANEOUS ONCE AS NEEDED
OUTPATIENT
Start: 2025-02-04

## 2025-02-04 RX ORDER — DIPHENHYDRAMINE HYDROCHLORIDE 50 MG/ML
50 INJECTION INTRAMUSCULAR; INTRAVENOUS ONCE AS NEEDED
OUTPATIENT
Start: 2025-02-04

## 2025-02-14 ENCOUNTER — PATIENT OUTREACH (OUTPATIENT)
Facility: CLINIC | Age: 75
End: 2025-02-14
Payer: MEDICARE

## 2025-02-14 NOTE — PROGRESS NOTES
Patient active with diabetes education but no f/u scheduled due to anticipated hand surgery. I called patient to offer scheduling diabetes education appointment and patient did not answer. Left voicemail with Patient  phone number for contact info. Will try again at a later time.

## 2025-03-13 ENCOUNTER — HOSPITAL ENCOUNTER (EMERGENCY)
Facility: HOSPITAL | Age: 75
Discharge: HOME | End: 2025-03-13
Attending: EMERGENCY MEDICINE
Payer: MEDICARE

## 2025-03-13 VITALS
BODY MASS INDEX: 33.13 KG/M2 | OXYGEN SATURATION: 98 % | DIASTOLIC BLOOD PRESSURE: 79 MMHG | HEART RATE: 71 BPM | SYSTOLIC BLOOD PRESSURE: 186 MMHG | WEIGHT: 180 LBS | RESPIRATION RATE: 20 BRPM | HEIGHT: 62 IN | TEMPERATURE: 99 F

## 2025-03-13 DIAGNOSIS — B37.31 VAGINAL YEAST INFECTION: ICD-10-CM

## 2025-03-13 DIAGNOSIS — B37.0 ORAL CANDIDIASIS: Primary | ICD-10-CM

## 2025-03-13 PROCEDURE — 99283 EMERGENCY DEPT VISIT LOW MDM: CPT

## 2025-03-13 RX ORDER — NYSTATIN 100000 [USP'U]/ML
4 SUSPENSION ORAL 4 TIMES DAILY
Qty: 160 ML | Refills: 0 | Status: SHIPPED | OUTPATIENT
Start: 2025-03-13 | End: 2025-03-23

## 2025-03-13 RX ORDER — FLUCONAZOLE 200 MG/1
200 TABLET ORAL ONCE
Qty: 2 TABLET | Refills: 0 | Status: SHIPPED | OUTPATIENT
Start: 2025-03-13 | End: 2025-03-14

## 2025-03-13 NOTE — ED PROVIDER NOTES
"Encounter Date: 3/13/2025       History     Chief Complaint   Patient presents with    Mouth Lesions     Pt states " I have like a yeast infection, it started in my mouth and it went all the way down"   C/o mouth sore since 1 wk, pt reports symptoms started after she finished taking prednisone pills    vaginal sores     Reports sores to vaginal area since approx 1 wk, pt reports noticing the sores after finishing the prednisone pills    Sinusitis     Reports " I have a sinus infection, I'm coughing, congestion"     Cough     Reports productive cough x 2 days ago      74-year-old female, history of diabetes mellitus, here from home via private vehicle complaining of oral thrush and vaginal yeast infection.  She states that she had taken a course of steroids for sinus issues, and finish the steroid course about one-week ago.  Soon thereafter she developed her current symptoms.  No fever or chills.  No vomiting.  She has not tried any medications for current symptoms.  She has painful swallowing, but no difficulty with swallowing.  No voice change.  She has some oral lesions as well.  Denies vaginal lesions.      Review of patient's allergies indicates:   Allergen Reactions    Cefuroxime Rash    Oxycodone Itching    Ketorolac tromethamine Other (See Comments)     Other reaction(s): Burning    Moxifloxacin Other (See Comments)     Heart racing.    Codeine Itching    Famotidine-ca carb-mag hydrox Hives    Ketorolac Other (See Comments)    Prevacid [lansoprazole] Itching     Past Medical History:   Diagnosis Date    Anxiety     Arthritis     Chronic sinusitis     Diabetes mellitus     Hypertension     IVONNE (iron deficiency anemia)      Past Surgical History:   Procedure Laterality Date    BREAST BIOPSY      BREAST LUMPECTOMY Left     BUNIONECTOMY Bilateral     COLONOSCOPY      ESOPHAGOGASTRODUODENOSCOPY      ETHMOIDECTOMY Bilateral 02/07/2019    Procedure: ETHMOIDECTOMY;  Surgeon: Michael Grubbs MD;  Location: [facility] OR; "  Service: ENT;  Laterality: Bilateral;  endoscopic anterior and posterior    FUNCTIONAL ENDOSCOPIC SINUS SURGERY (FESS) N/A 02/07/2019    Procedure: FESS (FUNCTIONAL ENDOSCOPIC SINUS SURGERY);  Surgeon: Michael Grubbs MD;  Location: [facility] OR;  Service: ENT;  Laterality: N/A;    FUNCTIONAL ENDOSCOPIC SINUS SURGERY (FESS) Bilateral 12/19/2019    Procedure: FESS (FUNCTIONAL ENDOSCOPIC SINUS SURGERY);  Surgeon: Michael Grubbs MD;  Location: [facility] OR;  Service: ENT;  Laterality: Bilateral;    MAXILLARY ANTROSTOMY Bilateral 02/07/2019    Procedure: MAXILLARY ANTROSTOMY;  Surgeon: Michael Grubbs MD;  Location: [facility] OR;  Service: ENT;  Laterality: Bilateral;    NASAL SEPTOPLASTY Bilateral 02/07/2019    Procedure: SEPTOPLASTY, NOSE;  Surgeon: Michael Grubbs MD;  Location: [facility] OR;  Service: ENT;  Laterality: Bilateral;    NASAL TURBINATE REDUCTION Bilateral 02/07/2019    Procedure: REDUCTION, NASAL TURBINATE;  Surgeon: Michael Grubbs MD;  Location: [facility] OR;  Service: ENT;  Laterality: Bilateral;    SINUS SURGERY      x4    TUBAL LIGATION      WRIST SURGERY Left 2016    [facility]Dr[facility] Smalls     Family History   Problem Relation Name Age of Onset    Leukemia Son      Breast cancer Neg Hx       Social History[1]  Review of Systems   Constitutional:  Negative for chills, fatigue and fever.   HENT:  Positive for mouth sores and sore throat. Negative for congestion, ear pain, rhinorrhea and trouble swallowing.    Eyes:  Negative for pain and redness.   Respiratory:  Negative for cough, chest tightness, shortness of breath and wheezing.    Cardiovascular:  Negative for chest pain and palpitations.   Gastrointestinal:  Negative for abdominal pain, blood in stool, constipation, diarrhea, nausea and vomiting.   Endocrine: Negative for polydipsia and polyuria.   Genitourinary:  Positive for vaginal discharge. Negative for dysuria, flank pain, frequency, hematuria, pelvic pain, vaginal bleeding and vaginal pain.         Patient states she has a slight thick whitish discharge, similar to previous episodes of yeast infections.   Musculoskeletal:  Negative for arthralgias, back pain, myalgias, neck pain and neck stiffness.   Skin:  Negative for pallor and rash.   Neurological:  Negative for dizziness, light-headedness and headaches.   Psychiatric/Behavioral:  Negative for confusion.        Physical Exam     Initial Vitals [03/13/25 0932]   BP Pulse Resp Temp SpO2   (S) (!) 186/79 71 20 98.7 °F (37.1 °C) 98 %      MAP       --         Physical Exam    Nursing note and vitals reviewed.  Constitutional: She appears well-developed and well-nourished.   HENT:   Head: Normocephalic and atraumatic.   Nose: Nose normal. Mouth/Throat: No oropharyngeal exudate.   Whitish plaques in the oropharynx with a few slightly ulcerated areas, consistent with a oral candidiasis.   Eyes: Conjunctivae and EOM are normal. Pupils are equal, round, and reactive to light. No scleral icterus.   Neck: Neck supple. No JVD present.   Normal range of motion.  Cardiovascular:  Normal rate, regular rhythm, normal heart sounds and intact distal pulses.           No murmur heard.  Pulmonary/Chest: Breath sounds normal. No stridor. No respiratory distress. She has no wheezes. She has no rhonchi. She has no rales.   Abdominal: Abdomen is soft. Bowel sounds are normal. She exhibits no distension. There is no abdominal tenderness.   Genitourinary:    Genitourinary Comments: Deferred by patient     Musculoskeletal:         General: No tenderness or edema. Normal range of motion.      Cervical back: Normal range of motion and neck supple.     Neurological: She is alert and oriented to person, place, and time. She has normal strength. No cranial nerve deficit or sensory deficit. GCS score is 15. GCS eye subscore is 4. GCS verbal subscore is 5. GCS motor subscore is 6.   Skin: Skin is warm and dry. Capillary refill takes less than 2 seconds. No rash noted. No erythema.    Psychiatric: She has a normal mood and affect. Her behavior is normal.         ED Course   Procedures  Labs Reviewed   HEPATITIS C ANTIBODY   HIV 1 / 2 ANTIBODY          Imaging Results    None          Medications - No data to display  Medical Decision Making  Differential includes oral candidiasis, vaginal candidiasis, other oral lesions such as aphthous ulcers etc., STI, etc..    Patient has signs and symptoms consistent with oral candidiasis, and states that she has a vaginal yeast infection.  Vaginal exam deferred by patient.  Denies any recent sexual contacts.  She is diabetic and I believe she is probably suffering from candidiasis as results for steroid use recently.  Will treat with nystatin swish and swallow, oral Diflucan, and Monistat vaginal creams etc..  She will follow-up with her PCP in a few days and return here as needed or if worse in any way.    Amount and/or Complexity of Data Reviewed  Labs: ordered.                                      Clinical Impression:  Final diagnoses:  [B37.0] Oral candidiasis (Primary)  [B37.31] Vaginal yeast infection          ED Disposition Condition    Discharge Stable          ED Prescriptions       Medication Sig Dispense Start Date End Date Auth. Provider    nystatin (MYCOSTATIN) 100,000 unit/mL suspension Take 4 mLs (400,000 Units total) by mouth 4 (four) times daily. for 10 days 160 mL 3/13/2025 3/23/2025 Bird Downey MD    fluconazole (DIFLUCAN) 200 MG Tab (Expires today) Take 1 tablet (200 mg total) by mouth once. for 1 dose 2 tablet 3/13/2025 3/13/2025 Bird Downey MD          Follow-up Information       Follow up With Specialties Details Why Contact Info    Sonya Blackman NP Family Medicine Call today  [address]  [address]  [facility] [place] [address] [phone]      [facility] Emergency Dept Emergency Medicine  As needed, If symptoms worsen [address]  [facility] [address] [phone]               [1]    Social History  Tobacco Use    Smoking status: Former     Current packs/day: 0.00     Average packs/day: 0.8 packs/day for 25.0 years (18.8 ttl pk-yrs)     Types: Cigarettes     Start date: 1974     Quit date: 1999     Years since quittin.9    Smokeless tobacco: Never   Substance Use Topics    Alcohol use: Yes     Comment: social    Drug use: Not Currently     Types: Other-see comments     Comment: tramadol as prescribed        Bird Downey MD  25 [phone]

## 2025-03-13 NOTE — DISCHARGE INSTRUCTIONS
Use nystatin liquid as prescribed, take 1 Diflucan tablet today, and take the 2nd tablet one-week, and use over-the-counter Monistat or other similar medications for vaginal yeast infection.  Make sure you control your glucose well, and follow-up with your primary care provider's soon as possible.  Return here if worse

## 2025-03-13 NOTE — ED NOTES
Pt states she took prednisone and after finishing pack she noticed discomfort in her mouth,vaginal, and anal area noting she also has swelling and redness and some lesions. Pt states she also feels that this has caused a yeast infection. Pt states that the swelling has increased her amount of hemorrhoids, Pt states she also feels like she haylee be beginning to get a cold and feels generally unwell. Pt states that she is not sexually active. Changed pt into gown, provided blankets x2. Pt resting comfortably in no acute distress.

## 2025-03-14 RX ORDER — MELOXICAM 15 MG/1
15 TABLET ORAL
Qty: 30 TABLET | Refills: 0 | OUTPATIENT
Start: 2025-03-14

## 2025-03-19 ENCOUNTER — HOSPITAL ENCOUNTER (EMERGENCY)
Facility: HOSPITAL | Age: 75
Discharge: HOME | End: 2025-03-19
Attending: EMERGENCY MEDICINE
Payer: MEDICARE

## 2025-03-19 VITALS
DIASTOLIC BLOOD PRESSURE: 63 MMHG | TEMPERATURE: 98 F | OXYGEN SATURATION: 96 % | HEIGHT: 62 IN | HEART RATE: 94 BPM | BODY MASS INDEX: 33.13 KG/M2 | WEIGHT: 180 LBS | RESPIRATION RATE: 20 BRPM | SYSTOLIC BLOOD PRESSURE: 133 MMHG

## 2025-03-19 DIAGNOSIS — M25.531 CHRONIC WRIST PAIN, RIGHT: Primary | ICD-10-CM

## 2025-03-19 DIAGNOSIS — B96.89 ACUTE BACTERIAL RHINOSINUSITIS: ICD-10-CM

## 2025-03-19 DIAGNOSIS — G89.29 CHRONIC WRIST PAIN, RIGHT: Primary | ICD-10-CM

## 2025-03-19 DIAGNOSIS — R52 PAIN: ICD-10-CM

## 2025-03-19 DIAGNOSIS — M19.031 OSTEOARTHRITIS OF RIGHT WRIST, UNSPECIFIED OSTEOARTHRITIS TYPE: ICD-10-CM

## 2025-03-19 DIAGNOSIS — J01.90 ACUTE BACTERIAL RHINOSINUSITIS: ICD-10-CM

## 2025-03-19 LAB
INFLUENZA A, MOLECULAR: NEGATIVE
INFLUENZA B, MOLECULAR: NEGATIVE
SARS-COV-2 RDRP RESP QL NAA+PROBE: NEGATIVE
SPECIMEN SOURCE: NORMAL

## 2025-03-19 PROCEDURE — 73110 X-RAY EXAM OF WRIST: CPT | Mod: TC,RT

## 2025-03-19 PROCEDURE — 87635 SARS-COV-2 COVID-19 AMP PRB: CPT

## 2025-03-19 PROCEDURE — 87502 INFLUENZA DNA AMP PROBE: CPT

## 2025-03-19 PROCEDURE — 73110 X-RAY EXAM OF WRIST: CPT | Mod: 26,RT,, | Performed by: RADIOLOGY

## 2025-03-19 PROCEDURE — 99284 EMERGENCY DEPT VISIT MOD MDM: CPT | Mod: 25

## 2025-03-19 RX ORDER — MELOXICAM 15 MG/1
15 TABLET ORAL
COMMUNITY
Start: 2025-02-06

## 2025-03-19 RX ORDER — LEVOCETIRIZINE DIHYDROCHLORIDE 5 MG/1
5 TABLET, FILM COATED ORAL
COMMUNITY
Start: 2025-03-09

## 2025-03-19 RX ORDER — METHOCARBAMOL 750 MG/1
750 TABLET, FILM COATED ORAL EVERY 8 HOURS
COMMUNITY
Start: 2025-02-24

## 2025-03-19 RX ORDER — HYDROCODONE BITARTRATE AND ACETAMINOPHEN 5; 325 MG/1; MG/1
1 TABLET ORAL EVERY 6 HOURS PRN
COMMUNITY
Start: 2025-02-24

## 2025-03-19 RX ORDER — MELOXICAM 15 MG/1
15 TABLET ORAL DAILY
Qty: 5 TABLET | Refills: 0 | Status: SHIPPED | OUTPATIENT
Start: 2025-03-19 | End: 2025-03-24

## 2025-03-19 RX ORDER — AMOXICILLIN AND CLAVULANATE POTASSIUM 875; 125 MG/1; MG/1
1 TABLET, FILM COATED ORAL 2 TIMES DAILY
Qty: 14 TABLET | Refills: 0 | Status: SHIPPED | OUTPATIENT
Start: 2025-03-19 | End: 2025-03-26

## 2025-03-19 RX ORDER — AZELASTINE 1 MG/ML
2 SPRAY, METERED NASAL 2 TIMES DAILY
COMMUNITY
Start: 2024-12-16

## 2025-03-19 RX ORDER — CYCLOBENZAPRINE HCL 5 MG
5 TABLET ORAL
COMMUNITY
Start: 2025-01-11

## 2025-03-19 NOTE — ED PROVIDER NOTES
Encounter Date: 3/19/2025       History     Chief Complaint   Patient presents with    Nasal Congestion     Sinus congestion x 3 days    Wrist Pain     R wrist pain x 1 year. Supposed to have carpal tunnel surgery.      74-year-old female complaining of acute on chronic atraumatic right wrist pain for 2 years and URI symptoms for 3 days.  Patient reports that she is due to see an orthopedic surgeon for carpal tunnel syndrome and arthritis of the right wrist this week however states that she developed URI symptoms several days ago.  She does have sick contacts in the household.  She endorses subjective fever, myalgias, postnasal drainage, sinus pressure bilaterally, and headache.    The history is provided by the patient. No  was used.     Review of patient's allergies indicates:   Allergen Reactions    Cefuroxime Rash    Oxycodone Itching    Ketorolac tromethamine Other (See Comments)     Other reaction(s): Burning    Moxifloxacin Other (See Comments)     Heart racing.    Codeine Itching    Famotidine-ca carb-mag hydrox Hives    Ketorolac Other (See Comments)    Prevacid [lansoprazole] Itching     Past Medical History:   Diagnosis Date    Anxiety     Arthritis     Chronic sinusitis     Diabetes mellitus     Hypertension     IVONNE (iron deficiency anemia)      Past Surgical History:   Procedure Laterality Date    BREAST BIOPSY      BREAST LUMPECTOMY Left     BUNIONECTOMY Bilateral     COLONOSCOPY      ESOPHAGOGASTRODUODENOSCOPY      ETHMOIDECTOMY Bilateral 02/07/2019    Procedure: ETHMOIDECTOMY;  Surgeon: Michael Grubbs MD;  Location: [facility] OR;  Service: ENT;  Laterality: Bilateral;  endoscopic anterior and posterior    FUNCTIONAL ENDOSCOPIC SINUS SURGERY (FESS) N/A 02/07/2019    Procedure: FESS (FUNCTIONAL ENDOSCOPIC SINUS SURGERY);  Surgeon: Michael Grubbs MD;  Location: [facility] OR;  Service: ENT;  Laterality: N/A;    FUNCTIONAL ENDOSCOPIC SINUS SURGERY (FESS) Bilateral 12/19/2019     Procedure: FESS (FUNCTIONAL ENDOSCOPIC SINUS SURGERY);  Surgeon: Michael Grubbs MD;  Location: [facility] OR;  Service: ENT;  Laterality: Bilateral;    MAXILLARY ANTROSTOMY Bilateral 02/07/2019    Procedure: MAXILLARY ANTROSTOMY;  Surgeon: Michael Grubbs MD;  Location: [facility] OR;  Service: ENT;  Laterality: Bilateral;    NASAL SEPTOPLASTY Bilateral 02/07/2019    Procedure: SEPTOPLASTY, NOSE;  Surgeon: Michael Grubbs MD;  Location: [facility] OR;  Service: ENT;  Laterality: Bilateral;    NASAL TURBINATE REDUCTION Bilateral 02/07/2019    Procedure: REDUCTION, NASAL TURBINATE;  Surgeon: Michael Grubbs MD;  Location: [facility] OR;  Service: ENT;  Laterality: Bilateral;    SINUS SURGERY      x4    TUBAL LIGATION      WRIST SURGERY Left 2016    [facility]Dr[facility] Smalls     Family History   Problem Relation Name Age of Onset    Leukemia Son      Breast cancer Neg Hx       Social History[1]  Review of Systems   Constitutional:  Positive for activity change and fever.        Temp max 100 several days prior to arrival.   HENT:  Positive for congestion, postnasal drip, rhinorrhea, sinus pressure and sinus pain. Negative for ear discharge, ear pain and sore throat.    Respiratory:  Positive for cough. Negative for shortness of breath and wheezing.    Cardiovascular:  Negative for chest pain and palpitations.   Gastrointestinal:  Negative for abdominal pain, diarrhea, nausea and vomiting.   Musculoskeletal:  Positive for myalgias. Negative for neck pain and neck stiffness.   Neurological:  Positive for headaches. Negative for dizziness, speech difficulty, weakness, light-headedness and numbness.   Hematological:  Does not bruise/bleed easily.   All other systems reviewed and are negative.      Physical Exam     Initial Vitals [03/19/25 1118]   BP Pulse Resp Temp SpO2   133/63 94 20 97.9 °F (36.6 °C) 96 %      MAP       --         Physical Exam    Nursing note and vitals reviewed.  Constitutional: She appears well-developed and  well-nourished. No distress.   HENT:   Head: Normocephalic and atraumatic.   Right Ear: External ear normal.   Left Ear: External ear normal. Mouth/Throat: No oropharyngeal exudate.   Bilateral tenderness to palpation of the frontal and zygomatic sinuses.  Copious clear posterior pharynx drainage noted.  No visible exudates.  Uvula is midline.  No evidence of posterior pharyngeal hemorrhage, or retropharyngeal abscess.  Phonation is normal.   Eyes: Conjunctivae are normal. Pupils are equal, round, and reactive to light. No scleral icterus.   Neck: Neck supple. No JVD present.   Normal range of motion.  Cardiovascular:  Normal rate, regular rhythm, normal heart sounds and intact distal pulses.           No murmur heard.  Pulmonary/Chest: Breath sounds normal. No respiratory distress. She has no rhonchi. She has no rales.   Abdominal: Abdomen is soft. Bowel sounds are normal. There is no abdominal tenderness. There is no rebound and no guarding.   Musculoskeletal:         General: No tenderness or edema. Normal range of motion.      Cervical back: Normal range of motion and neck supple.     Neurological: She is alert and oriented to person, place, and time. GCS score is 15. GCS eye subscore is 4. GCS verbal subscore is 5. GCS motor subscore is 6.   Skin: Skin is warm and dry. Capillary refill takes less than 2 seconds.   Psychiatric: She has a normal mood and affect. Her behavior is normal. Judgment and thought content normal.         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      Electronically signed by: Holland Hill  Date:    03/19/2025  Time:    12:22               Narrative:    EXAMINATION:  XR WRIST COMPLETE 3 VIEWS RIGHT    CLINICAL HISTORY:  Pain, unspecified    TECHNIQUE:  PA, lateral, and oblique views of the right wrist were performed.    COMPARISON:  None.    FINDINGS:  There is no acute fracture or dislocation.  Mild soft tissue swelling.    Carpal bones intact with mild to moderate degenerative osteoarthrosis most predominant within the 1st CMC joint.  Distal radius and ulna intact.                                       Medications - No data to display  Medical Decision Making  Patient was emergently assessed shortly after arrival.  Initial vital signs are stable.  Patient reports right wrist pain which is acute on chronic with URI symptoms for several days.  Differential diagnosis includes, but is not limited to, carpal tunnel syndrome, osteoarthritis, ligamentous injury, sprain, URI, COVID, rhinosinusitis, viral syndrome, bacterial sinusitis.  Influenza, and COVID screening are negative.  X-ray of right wrist obtained.  See radiologist's interpretation.    Amount and/or Complexity of Data Reviewed  Labs: ordered. Decision-making details documented in ED Course.  Radiology: ordered. Decision-making details documented in ED Course.    Risk  Prescription drug management.               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      [NC]      ED Course User Index  [NC] Olga Guevara NP                           Clinical Impression:  Final diagnoses:  [R52] Pain  [M25.531, G89.29] Chronic wrist pain, right (Primary)  [J01.90, B96.89] Acute bacterial  rhinosinusitis  [M19.031] Osteoarthritis of right wrist, unspecified osteoarthritis type          ED Disposition Condition    Discharge Stable          ED Prescriptions       Medication Sig Dispense Start Date End Date Auth. Provider    amoxicillin-clavulanate 875-125mg (AUGMENTIN) 875-125 mg per tablet Take 1 tablet by mouth 2 (two) times daily. 14 tablet 3/19/2025 -- Olga Guevara NP    meloxicam (MOBIC) 15 MG tablet Take 1 tablet (15 mg total) by mouth once daily. for 5 days 5 tablet 3/19/2025 3/24/2025 Olga Guevara NP          Follow-up Information       Follow up With Specialties Details Why Contact Info    Sonya Blackman NP Family Medicine In 2 days  [address]  [address]  [facility] [address] [phone]      [facility] Emergency Dept Emergency Medicine  As needed, If symptoms worsen [address] [facility] [address] [phone]                 [1]   Social History  Tobacco Use    Smoking status: Former     Current packs/day: 0.00     Average packs/day: 0.8 packs/day for 25.0 years (18.8 ttl pk-yrs)     Types: Cigarettes     Start date: 1974     Quit date: 1999     Years since quittin.9    Smokeless tobacco: Never   Substance Use Topics    Alcohol use: Yes     Comment: social    Drug use: Not Currently     Types: Other-see comments     Comment: tramadol as prescribed        Olga Guevara NP  25 [phone]

## 2025-03-20 ENCOUNTER — OFFICE VISIT (OUTPATIENT)
Dept: ORTHOPEDICS | Facility: CLINIC | Age: 75
End: 2025-03-20
Payer: MEDICARE

## 2025-03-20 VITALS — HEIGHT: 62 IN | BODY MASS INDEX: 33.26 KG/M2 | WEIGHT: 180.75 LBS

## 2025-03-20 DIAGNOSIS — M79.641 RIGHT HAND PAIN: Primary | ICD-10-CM

## 2025-03-20 DIAGNOSIS — G56.01 CARPAL TUNNEL SYNDROME, RIGHT: ICD-10-CM

## 2025-03-20 DIAGNOSIS — G56.21 CUBITAL TUNNEL SYNDROME, RIGHT: ICD-10-CM

## 2025-03-20 PROCEDURE — 1160F RVW MEDS BY RX/DR IN RCRD: CPT | Mod: CPTII,S$GLB,, | Performed by: ORTHOPAEDIC SURGERY

## 2025-03-20 PROCEDURE — 1157F ADVNC CARE PLAN IN RCRD: CPT | Mod: CPTII,S$GLB,, | Performed by: ORTHOPAEDIC SURGERY

## 2025-03-20 PROCEDURE — 1159F MED LIST DOCD IN RCRD: CPT | Mod: CPTII,S$GLB,, | Performed by: ORTHOPAEDIC SURGERY

## 2025-03-20 PROCEDURE — 1100F PTFALLS ASSESS-DOCD GE2>/YR: CPT | Mod: CPTII,S$GLB,, | Performed by: ORTHOPAEDIC SURGERY

## 2025-03-20 PROCEDURE — 3288F FALL RISK ASSESSMENT DOCD: CPT | Mod: CPTII,S$GLB,, | Performed by: ORTHOPAEDIC SURGERY

## 2025-03-20 PROCEDURE — 1125F AMNT PAIN NOTED PAIN PRSNT: CPT | Mod: CPTII,S$GLB,, | Performed by: ORTHOPAEDIC SURGERY

## 2025-03-20 PROCEDURE — 3008F BODY MASS INDEX DOCD: CPT | Mod: CPTII,S$GLB,, | Performed by: ORTHOPAEDIC SURGERY

## 2025-03-20 PROCEDURE — 99214 OFFICE O/P EST MOD 30 MIN: CPT | Mod: S$GLB,,, | Performed by: ORTHOPAEDIC SURGERY

## 2025-03-20 PROCEDURE — 99999 PR PBB SHADOW E&M-EST. PATIENT-LVL IV: CPT | Mod: PBBFAC,,, | Performed by: ORTHOPAEDIC SURGERY

## 2025-03-20 RX ORDER — CELECOXIB 100 MG/1
100 CAPSULE ORAL 2 TIMES DAILY
Qty: 60 CAPSULE | Refills: 0 | Status: SHIPPED | OUTPATIENT
Start: 2025-03-20

## 2025-03-20 NOTE — PROGRESS NOTES
Subjective:      Patient ID: Argelia Pyle is a 74 y.o. female.    Chief Complaint: Pain of the Right Hand    HPI  The patient is in with the ongoing right hand pain tingling and numbness.  We saw her last year for same symptoms recommended nerve studies these were not accomplished timely and once they were done she did not follow up with us.  She chose to see a hand specialist in [place] who has recommended surgery for.  She is frustrated that he refused according to her to address the arthritis pain that she has and only wanted to do her carpal tunnel and cubital tunnel releases.  ROS      Objective:    Ortho Exam     Constitutional:   Patient is alert  and oriented in no acute distress  HEENT:  normocephalic atraumatic; PERRL EOMI  Neck:  Supple without adenopathy  Cardiovascular:  Normal rate and rhythm  Pulmonary:  Normal respiratory effort normal chest wall expansion  Abdominal:  Nonprotuberant nondistended  Musculoskeletal:  Patient has adequate cervical range of motion without tenderness  With a negative Lhermitte's and Spurling sign.  Upon gross inspection of the hands  There is no mass, swelling she has some subtle thenar atrophy Strength is 5/5 including APB strength testing  Negative Finkelstein's and mildly positive CMC grind.  Positive carpal tunnel compression test positive Tinel's at the right elbow  Negative Tinel's.  Equivocal Phalen's  Neurological:  No focal defect; cranial nerves 2-12 grossly intact  Psychiatric/behavioral:  Mood and behavior normal      X-Ray Wrist Complete Right  Narrative: EXAMINATION:  XR WRIST COMPLETE 3 VIEWS RIGHT    CLINICAL HISTORY:  Pain, unspecified    TECHNIQUE:  PA, lateral, and oblique views of the right wrist were performed.    COMPARISON:  None.    FINDINGS:  There is no acute fracture or dislocation.  Mild soft tissue swelling.    Carpal bones intact with mild to moderate degenerative osteoarthrosis most predominant within the 1st CMC joint.  Distal  radius and ulna intact.  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    Electronically signed by: Holland Hill  Date:    03/19/2025  Time:    12:22       My Radiographs Findings:    Radiographs consistent with degenerative change particularly with the CMC joint no acute osseous abnormalities  Assessment:       Encounter Diagnoses   Name Primary?    Right hand pain Yes    Carpal tunnel syndrome, right     Cubital tunnel syndrome, right          Plan:       I have discussed medical condition treatment options with her at length.  Nerve studies not available for my review.  If they show moderate compressive lesions then I have explained that I would entertain surgery for but I have suggested that she see her hand specialists for additional treatment for her CMC degenerative condition.  I will give her some Celebrex for her pain.  Follow up after obtaining nerve studies for my review.        Past Medical History:   Diagnosis Date    Anxiety     Arthritis     Chronic sinusitis     Diabetes mellitus     Hypertension     IVONNE (iron deficiency anemia)      Past Surgical History:   Procedure Laterality Date    BREAST BIOPSY      BREAST LUMPECTOMY Left     BUNIONECTOMY Bilateral     COLONOSCOPY      ESOPHAGOGASTRODUODENOSCOPY      ETHMOIDECTOMY Bilateral 02/07/2019    Procedure: ETHMOIDECTOMY;  Surgeon: Michael Grubbs MD;  Location: [facility] OR;  Service: ENT;  Laterality: Bilateral;  endoscopic anterior and posterior    FUNCTIONAL ENDOSCOPIC SINUS SURGERY (FESS) N/A 02/07/2019    Procedure: FESS (FUNCTIONAL ENDOSCOPIC SINUS SURGERY);  Surgeon: Michael Grubbs MD;  Location: [facility] OR;  Service: ENT;  Laterality: N/A;    FUNCTIONAL ENDOSCOPIC SINUS SURGERY (FESS) Bilateral 12/19/2019    Procedure: FESS (FUNCTIONAL ENDOSCOPIC SINUS SURGERY);  Surgeon: Michael Grubbs MD;  Location: [facility] OR;  Service: ENT;  Laterality: Bilateral;    MAXILLARY ANTROSTOMY Bilateral 02/07/2019    Procedure:  MAXILLARY ANTROSTOMY;  Surgeon: Michael Grubbs MD;  Location: [facility] OR;  Service: ENT;  Laterality: Bilateral;    NASAL SEPTOPLASTY Bilateral 2019    Procedure: SEPTOPLASTY, NOSE;  Surgeon: Michael Grubbs MD;  Location: [facility] OR;  Service: ENT;  Laterality: Bilateral;    NASAL TURBINATE REDUCTION Bilateral 2019    Procedure: REDUCTION, NASAL TURBINATE;  Surgeon: Michael Grubbs MD;  Location: [facility] OR;  Service: ENT;  Laterality: Bilateral;    SINUS SURGERY      x4    TUBAL LIGATION      WRIST SURGERY Left 2016    [facility]-Dr. Smalls       Current Medications[1]    Review of patient's allergies indicates:   Allergen Reactions    Cefuroxime Rash    Oxycodone Itching    Ketorolac tromethamine Other (See Comments)     Other reaction(s): Burning    Moxifloxacin Other (See Comments)     Heart racing.    Codeine Itching    Famotidine-ca carb-mag hydrox Hives    Ketorolac Other (See Comments)    Prevacid [lansoprazole] Itching       Family History   Problem Relation Name Age of Onset    Leukemia Son      Breast cancer Neg Hx       Social History     Occupational History    Not on file   Tobacco Use    Smoking status: Former     Current packs/day: 0.00     Average packs/day: 0.8 packs/day for 25.0 years (18.8 ttl pk-yrs)     Types: Cigarettes     Start date: 1974     Quit date: 1999     Years since quittin.9     Passive exposure: Past    Smokeless tobacco: Never   Substance and Sexual Activity    Alcohol use: Yes     Comment: social    Drug use: Not Currently     Types: Other-see comments     Comment: tramadol as prescribed    Sexual activity: Not Currently     Birth control/protection: See Surgical Hx            [1]   Current Outpatient Medications:     albuterol (PROVENTIL/VENTOLIN HFA) 90 mcg/actuation inhaler, Inhale 2 puffs into the lungs every 4 (four) hours as needed for Wheezing or Shortness of Breath., Disp: 18 g, Rfl: 11    amLODIPine (NORVASC) 10 MG tablet, Take 1 tablet  (10 mg total) by mouth every evening., Disp: 90 tablet, Rfl: 3    amoxicillin-clavulanate 875-125mg (AUGMENTIN) 875-125 mg per tablet, Take 1 tablet by mouth 2 (two) times daily., Disp: 14 tablet, Rfl: 0    aspirin (ECOTRIN) 81 MG EC tablet, Take 1 tablet (81 mg total) by mouth every evening., Disp: 90 tablet, Rfl: 3    azelastine (ASTELIN) 137 mcg (0.1 %) nasal spray, 2 sprays 2 (two) times daily., Disp: , Rfl:     blood sugar diagnostic Strp, To check BG 3 times daily, to use with insurance preferred meter, Disp: 200 strip, Rfl: 11    blood-glucose meter kit, To check BG 3 times daily, to use with insurance preferred meter, Disp: 1 each, Rfl: 0    busPIRone (BUSPAR) 30 MG Tab, Take 1 tablet (30 mg total) by mouth 2 (two) times daily., Disp: 180 tablet, Rfl: 3    cyclobenzaprine (FLEXERIL) 5 MG tablet, Take 5 mg by mouth., Disp: , Rfl:     empagliflozin (JARDIANCE) 25 mg tablet, Take 1 tablet (25 mg total) by mouth every morning., Disp: 90 tablet, Rfl: 3    fluticasone propionate (FLONASE) 50 mcg/actuation nasal spray, 1 spray (50 mcg total) by Each Nostril route once daily., Disp: 15.8 mL, Rfl: 11    glipiZIDE (GLUCOTROL) 10 MG tablet, Take 1 tablet (10 mg total) by mouth 2 (two) times daily before meals., Disp: 180 tablet, Rfl: 3    HYDROcodone-acetaminophen (NORCO) 5-325 mg per tablet, Take 1 tablet by mouth every 6 (six) hours as needed., Disp: , Rfl:     insulin glargine U-100, Lantus, (LANTUS SOLOSTAR U-100 INSULIN) 100 unit/mL (3 mL) InPn pen, Inject 25 Units into the skin every evening., Disp: 7.5 mL, Rfl: 11    lancets Misc, To check BG 3 times daily, to use with insurance preferred meter, Disp: 200 each, Rfl: 11    levocetirizine (XYZAL) 5 MG tablet, Take 5 mg by mouth., Disp: , Rfl:     losartan-hydrochlorothiazide 100-25 mg (HYZAAR) 100-25 mg per tablet, Take 1 tablet by mouth every morning., Disp: 90 tablet, Rfl: 3    meloxicam (MOBIC) 15 MG tablet, Take 15 mg by mouth., Disp: , Rfl:     meloxicam  "(MOBIC) 15 MG tablet, Take 1 tablet (15 mg total) by mouth once daily. for 5 days, Disp: 5 tablet, Rfl: 0    metFORMIN (GLUCOPHAGE) 1000 MG tablet, Take 1 tablet (1,000 mg total) by mouth 2 (two) times daily., Disp: 180 tablet, Rfl: 3    methocarbamoL (ROBAXIN) 750 MG Tab, Take 750 mg by mouth every 8 (eight) hours., Disp: , Rfl:     metoprolol tartrate (LOPRESSOR) 50 MG tablet, Take 1 tablet (50 mg total) by mouth 2 (two) times daily., Disp: 180 tablet, Rfl: 3    montelukast (SINGULAIR) 10 mg tablet, Take 1 tablet (10 mg total) by mouth every evening., Disp: 90 tablet, Rfl: 3    nystatin (MYCOSTATIN) 100,000 unit/mL suspension, Take 4 mLs (400,000 Units total) by mouth 4 (four) times daily. for 10 days, Disp: 160 mL, Rfl: 0    pantoprazole (PROTONIX) 20 MG tablet, Take 1 tablet (20 mg total) by mouth every morning., Disp: 90 tablet, Rfl: 3    paroxetine (PAXIL) 20 MG tablet, Take 1 tablet (20 mg total) by mouth every evening., Disp: 90 tablet, Rfl: 3    pen needle, diabetic (BD GIUSEPPE 2ND GEN PEN NEEDLE) 32 gauge x 5/32" Ndle, 1 Device by Misc.(Non-Drug; Combo Route) route nightly., Disp: 100 each, Rfl: 11    pregabalin (LYRICA) 75 MG capsule, TAKE 1 CAPSULE 2 TIMES A DAY FOR NERVE PAIN, Disp: 180 capsule, Rfl: 1    simvastatin (ZOCOR) 20 MG tablet, Take 1 tablet (20 mg total) by mouth nightly., Disp: 90 tablet, Rfl: 3    traZODone (DESYREL) 50 MG tablet, Take 1 tablet (50 mg total) by mouth every evening., Disp: 90 tablet, Rfl: 3    celecoxib (CELEBREX) 100 MG capsule, Take 1 capsule (100 mg total) by mouth 2 (two) times daily., Disp: 60 capsule, Rfl: 0    "

## 2025-03-24 ENCOUNTER — TELEPHONE (OUTPATIENT)
Dept: FAMILY MEDICINE | Facility: CLINIC | Age: 75
End: 2025-03-24
Payer: MEDICARE

## 2025-03-24 NOTE — TELEPHONE ENCOUNTER
----- Message from Kevin sent at 3/24/2025 10:57 AM CDT -----  Type:  Needs Medical AdviceWho Called: pt Symptoms (please be specific): sinus infection with diarrhea How long has patient had these symptoms:  since Wed 03/19Pharmacy name and phone #:  [facility] - [facility], [place] - [address] [facility] [place] [address]Nuxww: [phone] Fax: [phone] the patient rather a call back or a response via [facility]? Call back [facility] Call Back Number: [phone] Additional Information: pt needs new rx for sinus infection antibiotics prescribed aren't working as well as pt needs new needles for insulin pharmacy told pt she had no more refills ,  please call to advise, Thank You.

## 2025-03-24 NOTE — TELEPHONE ENCOUNTER
Attempted to contact Argelia Pyle to discuss medication    Unable to leave message on phone - no voicemail or mailbox full on [phone] (home).    Jesenia Mack LPN

## 2025-03-25 DIAGNOSIS — E11.69 TYPE 2 DIABETES MELLITUS WITH HYPERLIPIDEMIA: ICD-10-CM

## 2025-03-25 DIAGNOSIS — E78.5 TYPE 2 DIABETES MELLITUS WITH HYPERLIPIDEMIA: ICD-10-CM

## 2025-03-25 DIAGNOSIS — J06.9 URI, ACUTE: Primary | ICD-10-CM

## 2025-03-25 NOTE — TELEPHONE ENCOUNTER
"----- Message from Jose Elias sent at 3/25/2025 11:07 AM CDT -----  Contact: Pt [phone]  Type:  RX Refill RequestWho Called: PtRefill or New Rx:RefillRX Name and Strength:pen needle, diabetic (BD GIUSEPPE 2ND GEN PEN NEEDLE) 32 gauge x 5/32" NdleHow is the patient currently taking it? (ex. 1XDay): 1 xIs this a 30 day or 90 day RX:90Preferred Pharmacy with phone number:[facility], [place] - [address] [facility] [place] [address]Drtsc: [phone] Fax: [phone] or Mail Order:LocalOrdering Provider:Yamil the patient rather a call back or a response via [facility]? [facility] Call Back Number:[phone] Additional Information: Pt adv she is out of needles and forgot to mention them in her previous message. Pls call back and adv. Thank you.  "

## 2025-03-25 NOTE — TELEPHONE ENCOUNTER
Please see earlier msg about diarrhea and see under medications not prescribed by this NP. Pt needs to contact prescriber as I do not know what she is being treated for. Thanks, Blanca

## 2025-03-25 NOTE — TELEPHONE ENCOUNTER
Spoke w/ pt   Pt prescribed AUGMENTIN 3/19/25  Pt c/o cough, congestion, nausea, diarrhea,   Pt requesting NP to call alternative abx d/t excessive diarrhea

## 2025-03-25 NOTE — TELEPHONE ENCOUNTER
----- Message from Lea sent at 3/25/2025 10:22 AM CDT -----  Contact: pt  Type: Needs Medical Advice Who Called: Sergio Call Back Number:[phone] Information: Requesting a call back regarding  Pt is asking for the office to call her. Pt requested a antibiotic a few days ago and asking if something else will be called in .  Please Advise- Thank you

## 2025-03-26 RX ORDER — AZITHROMYCIN 250 MG/1
TABLET, FILM COATED ORAL
Qty: 6 TABLET | Refills: 0 | Status: SHIPPED | OUTPATIENT
Start: 2025-03-26 | End: 2025-03-31

## 2025-03-26 RX ORDER — PEN NEEDLE, DIABETIC 30 GX3/16"
1 NEEDLE, DISPOSABLE MISCELLANEOUS NIGHTLY
Qty: 100 EACH | Refills: 11 | Status: SHIPPED | OUTPATIENT
Start: 2025-03-26

## 2025-03-26 NOTE — TELEPHONE ENCOUNTER
Abx was prescribed by ER physician for URI on 3.19.25   Pt unable to contact ER physician for med change

## 2025-04-08 ENCOUNTER — TELEPHONE (OUTPATIENT)
Dept: ORTHOPEDICS | Facility: CLINIC | Age: 75
End: 2025-04-08
Payer: MEDICARE

## 2025-04-08 NOTE — TELEPHONE ENCOUNTER
Called pt to assist in scheduling appointment to f/u on EMG results.   No answer unable to leave VM due to VM being full.   Portal message sent

## 2025-04-11 ENCOUNTER — HOSPITAL ENCOUNTER (OUTPATIENT)
Dept: RADIOLOGY | Facility: HOSPITAL | Age: 75
Discharge: HOME | End: 2025-04-11
Attending: NURSE PRACTITIONER
Payer: MEDICARE

## 2025-04-11 DIAGNOSIS — Z12.31 BREAST CANCER SCREENING BY MAMMOGRAM: ICD-10-CM

## 2025-04-11 PROCEDURE — 77063 BREAST TOMOSYNTHESIS BI: CPT | Mod: 26,,, | Performed by: RADIOLOGY

## 2025-04-11 PROCEDURE — 77067 SCR MAMMO BI INCL CAD: CPT | Mod: 26,,, | Performed by: RADIOLOGY

## 2025-04-11 PROCEDURE — 77063 BREAST TOMOSYNTHESIS BI: CPT | Mod: TC

## 2025-04-13 ENCOUNTER — RESULTS FOLLOW-UP (OUTPATIENT)
Dept: FAMILY MEDICINE | Facility: CLINIC | Age: 75
End: 2025-04-13

## 2025-04-14 ENCOUNTER — TELEPHONE (OUTPATIENT)
Dept: FAMILY MEDICINE | Facility: CLINIC | Age: 75
End: 2025-04-14
Payer: MEDICARE

## 2025-04-14 NOTE — TELEPHONE ENCOUNTER
----- Message from Armando sent at 4/14/2025 11:29 AM CDT -----  Type: Needs Medical AdviceWho Called:  pt Symptoms (please be specific):  How long has patient had these symptoms:  Pharmacy name and phone #:  [facility] Call Back Number: [phone] Information: pt is requesting a call back regarding getting an appt with pain management

## 2025-04-16 ENCOUNTER — TELEPHONE (OUTPATIENT)
Dept: FAMILY MEDICINE | Facility: CLINIC | Age: 75
End: 2025-04-16
Payer: MEDICARE

## 2025-04-16 NOTE — TELEPHONE ENCOUNTER
----- Message from Radha sent at 4/16/2025 12:02 PM CDT -----  Regarding: Needs to reschedule  Type: Needs Medical AdviceWho Called:  Rafat Call Back Number: [phone] Information: Pt wants to talk to the office directly to reschedule her, please advsie

## 2025-04-22 ENCOUNTER — OFFICE VISIT (OUTPATIENT)
Dept: FAMILY MEDICINE | Facility: CLINIC | Age: 75
End: 2025-04-22
Payer: MEDICARE

## 2025-04-22 VITALS
SYSTOLIC BLOOD PRESSURE: 138 MMHG | DIASTOLIC BLOOD PRESSURE: 79 MMHG | HEIGHT: 62 IN | OXYGEN SATURATION: 98 % | BODY MASS INDEX: 33.56 KG/M2 | WEIGHT: 182.38 LBS | HEART RATE: 81 BPM

## 2025-04-22 DIAGNOSIS — M79.641 RIGHT HAND PAIN: Primary | ICD-10-CM

## 2025-04-22 DIAGNOSIS — D50.8 IRON DEFICIENCY ANEMIA SECONDARY TO INADEQUATE DIETARY IRON INTAKE: ICD-10-CM

## 2025-04-22 DIAGNOSIS — E11.59 HYPERTENSION ASSOCIATED WITH DIABETES: ICD-10-CM

## 2025-04-22 DIAGNOSIS — I15.2 HYPERTENSION ASSOCIATED WITH DIABETES: ICD-10-CM

## 2025-04-22 DIAGNOSIS — R21 RASH: ICD-10-CM

## 2025-04-22 PROCEDURE — 99214 OFFICE O/P EST MOD 30 MIN: CPT | Mod: S$GLB,,, | Performed by: NURSE PRACTITIONER

## 2025-04-22 PROCEDURE — 1157F ADVNC CARE PLAN IN RCRD: CPT | Mod: CPTII,S$GLB,, | Performed by: NURSE PRACTITIONER

## 2025-04-22 RX ORDER — INSULIN GLARGINE 100 [IU]/ML
25 INJECTION, SOLUTION SUBCUTANEOUS NIGHTLY
Qty: 7.5 ML | Refills: 11 | Status: SHIPPED | OUTPATIENT
Start: 2025-04-22 | End: 2025-04-30 | Stop reason: SDUPTHER

## 2025-04-22 RX ORDER — BETAMETHASONE DIPROPIONATE 0.5 MG/G
CREAM TOPICAL 2 TIMES DAILY PRN
Qty: 45 G | Refills: 5 | Status: SHIPPED | OUTPATIENT
Start: 2025-04-22 | End: 2025-04-30

## 2025-04-22 NOTE — PROGRESS NOTES
dSubjective:       Patient ID: Argelia Pyle is a 74 y.o. female.    Chief Complaint:   History of Present Illness    CHIEF COMPLAINT:  Patient presents today for right wrist and hand pain requiring surgery    RIGHT HAND/WRIST PAIN:  She reports difficulty moving fingers in right hand, particularly the middle finger. She experiences severe pain when removing wrist splint for activities such as using the bathroom. Nerve conduction study was performed this year. Wrist x-ray was performed in March. She was scheduled for surgery with Dr. Gale at [facility] in [place] 9/2024 yet canceled due to A1c 11.6% most recent 7.5 12/2024. Due for labs.     DIABETES:  She reports being without insulin for one week. Home glucose readings have been ranging between 160-180 mg/dL. Has a CGM yet is operated by [employer].     DERMATOLOGIC:  She reports new onset of worsening pruritic rash. She has been applying Vaseline without improvement in symptoms.    MEDICAL HISTORY:  She has history of iron deficiency requiring iron infusion, which resulted in improved functional capacity, particularly with stair climbing.        Past Medical History:   Diagnosis Date    Anxiety     Arthritis     Chronic sinusitis     Diabetes mellitus     Hypertension     IVONNE (iron deficiency anemia)        Past Surgical History:   Procedure Laterality Date    BREAST BIOPSY      BREAST LUMPECTOMY Left     BUNIONECTOMY Bilateral     COLONOSCOPY      ESOPHAGOGASTRODUODENOSCOPY      ETHMOIDECTOMY Bilateral 02/07/2019    Procedure: ETHMOIDECTOMY;  Surgeon: Michael Grubbs MD;  Location: [facility] OR;  Service: ENT;  Laterality: Bilateral;  endoscopic anterior and posterior    FUNCTIONAL ENDOSCOPIC SINUS SURGERY (FESS) N/A 02/07/2019    Procedure: FESS (FUNCTIONAL ENDOSCOPIC SINUS SURGERY);  Surgeon: Michael Grubbs MD;  Location: [facility] OR;  Service: ENT;  Laterality: N/A;    FUNCTIONAL ENDOSCOPIC SINUS SURGERY (FESS) Bilateral 12/19/2019    Procedure:  FESS (FUNCTIONAL ENDOSCOPIC SINUS SURGERY);  Surgeon: Michael Grubbs MD;  Location: [facility] OR;  Service: ENT;  Laterality: Bilateral;    MAXILLARY ANTROSTOMY Bilateral 02/07/2019    Procedure: MAXILLARY ANTROSTOMY;  Surgeon: Michael Gurbbs MD;  Location: [facility] OR;  Service: ENT;  Laterality: Bilateral;    NASAL SEPTOPLASTY Bilateral 02/07/2019    Procedure: SEPTOPLASTY, NOSE;  Surgeon: Michael Grubbs MD;  Location: [facility] OR;  Service: ENT;  Laterality: Bilateral;    NASAL TURBINATE REDUCTION Bilateral 02/07/2019    Procedure: REDUCTION, NASAL TURBINATE;  Surgeon: Michael Grubbs MD;  Location: [facility] OR;  Service: ENT;  Laterality: Bilateral;    SINUS SURGERY      x4    TUBAL LIGATION      WRIST SURGERY Left 2016    [facility]        Social History[1]    Family History   Problem Relation Name Age of Onset    Leukemia Son      Breast cancer Neg Hx         Review of patient's allergies indicates:   Allergen Reactions    Cefuroxime Rash    Oxycodone Itching    Ketorolac tromethamine Other (See Comments)     Other reaction(s): Burning    Moxifloxacin Other (See Comments)     Heart racing.    Codeine Itching    Famotidine-ca carb-mag hydrox Hives    Ketorolac Other (See Comments)    Prevacid [lansoprazole] Itching        Current Medications[2]    Wrist Pain       Review of Systems   Constitutional: Negative.    HENT: Negative.     Eyes: Negative.    Respiratory: Negative.     Cardiovascular: Negative.    Gastrointestinal: Negative.    Endocrine: Negative.    Genitourinary: Negative.    Musculoskeletal: Negative.         Right wrist and hand, including finger involvement.   Skin:  Positive for rash.   Allergic/Immunologic: Negative.    Neurological: Negative.    Hematological: Negative.    Psychiatric/Behavioral: Negative.               Objective:      Physical Exam  Vitals and nursing note reviewed.   Constitutional:       General: She is not in acute distress.     Appearance: Normal appearance.  She is well-developed. She is obese. She is not ill-appearing.   HENT:      Head: Normocephalic.   Eyes:      Conjunctiva/sclera: Conjunctivae normal.   Neck:      Thyroid: No thyromegaly.   Cardiovascular:      Rate and Rhythm: Normal rate and regular rhythm.      Heart sounds: Normal heart sounds. No murmur heard.  Pulmonary:      Effort: Pulmonary effort is normal.      Breath sounds: Normal breath sounds. No wheezing or rales.   Musculoskeletal:         General: Normal range of motion.      Cervical back: Normal range of motion.      Right lower leg: No edema.      Left lower leg: No edema.      Comments: Limited ROM of right hand and wrist.    Skin:     General: Skin is warm and dry.   Neurological:      Mental Status: She is alert and oriented to person, place, and time. Mental status is at baseline.   Psychiatric:         Mood and Affect: Mood normal.         Behavior: Behavior normal.         Thought Content: Thought content normal.         Judgment: Judgment normal.         Assessment:      1. Right hand pain    2. Hypertension associated with diabetes    3. Rash    4. Iron deficiency anemia secondary to inadequate dietary iron intake        Plan:    1- refer to Dr. Gale at [facility] in [place] after labs and confirmed controlled DM  2- request EMG from [facility]  3- lantus filled x 1 yr, has been out a wk  4- betamethasone for itchy rash   5- Fasting labs ordered, to be done soon  6- RTC 6 mo  7- NP to call [facility] to confirm MAR  8- recommend wearing right wrist brace all the time  -      Right hand pain    Hypertension associated with diabetes  -     insulin glargine U-100, Lantus, (LANTUS SOLOSTAR U-100 INSULIN) 100 unit/mL (3 mL) InPn pen; Inject 25 Units into the skin every evening.  Dispense: 7.5 mL; Refill: 11  -     Hemoglobin A1C; Future; Expected date: 04/22/2025  -     Lipid Panel; Future; Expected date: 04/22/2025  -     CBC Auto Differential; Future; Expected date: 04/22/2025  -      Comprehensive Metabolic Panel; Future; Expected date: 2025  -     TSH; Future; Expected date: 2025    Rash  -     betamethasone dipropionate 0.05 % cream; Apply topically 2 (two) times daily as needed (itchy rash).  Dispense: 45 g; Refill: 5    Iron deficiency anemia secondary to inadequate dietary iron intake  -     Ferritin; Future; Expected date: 2025  -     Iron and TIBC; Future; Expected date: 2025        Risks, benefits, and side effects were discussed with the patient. All questions were answered to the fullest satisfaction of the patient, and pt verbalized understanding and agreement to treatment plan. Pt was to call with any new or worsening symptoms, or present to the ER.        This note was generated with the assistance of ambient listening technology. Verbal consent was obtained by the patient and accompanying visitor(s) for the recording of patient appointment to facilitate this note. I attest to having reviewed and edited the generated note for accuracy, though some syntax or spelling errors may persist. Please contact the author of this note for any clarification.                 [1]   Social History  Socioeconomic History    Marital status:    Tobacco Use    Smoking status: Former     Current packs/day: 0.00     Average packs/day: 0.8 packs/day for 25.0 years (18.8 ttl pk-yrs)     Types: Cigarettes     Start date: 1974     Quit date: 1999     Years since quittin.0     Passive exposure: Past    Smokeless tobacco: Never   Substance and Sexual Activity    Alcohol use: Yes     Comment: social    Drug use: Not Currently     Types: Other-see comments     Comment: tramadol as prescribed    Sexual activity: Not Currently     Birth control/protection: See Surgical Hx   [2]   Current Outpatient Medications:     albuterol (PROVENTIL/VENTOLIN HFA) 90 mcg/actuation inhaler, Inhale 2 puffs into the lungs every 4 (four) hours as needed for Wheezing or Shortness of  Breath., Disp: 18 g, Rfl: 11    amLODIPine (NORVASC) 10 MG tablet, Take 1 tablet (10 mg total) by mouth every evening., Disp: 90 tablet, Rfl: 3    aspirin (ECOTRIN) 81 MG EC tablet, Take 1 tablet (81 mg total) by mouth every evening., Disp: 90 tablet, Rfl: 3    azelastine (ASTELIN) 137 mcg (0.1 %) nasal spray, 2 sprays 2 (two) times daily., Disp: , Rfl:     betamethasone dipropionate 0.05 % cream, Apply topically 2 (two) times daily as needed (itchy rash)., Disp: 45 g, Rfl: 5    blood sugar diagnostic Strp, To check BG 3 times daily, to use with insurance preferred meter, Disp: 200 strip, Rfl: 11    blood-glucose meter kit, To check BG 3 times daily, to use with insurance preferred meter, Disp: 1 each, Rfl: 0    busPIRone (BUSPAR) 30 MG Tab, Take 1 tablet (30 mg total) by mouth 2 (two) times daily., Disp: 180 tablet, Rfl: 3    cyclobenzaprine (FLEXERIL) 5 MG tablet, Take 5 mg by mouth., Disp: , Rfl:     empagliflozin (JARDIANCE) 25 mg tablet, Take 1 tablet (25 mg total) by mouth every morning., Disp: 90 tablet, Rfl: 3    fluticasone propionate (FLONASE) 50 mcg/actuation nasal spray, 1 spray (50 mcg total) by Each Nostril route once daily., Disp: 15.8 mL, Rfl: 11    glipiZIDE (GLUCOTROL) 10 MG tablet, Take 1 tablet (10 mg total) by mouth 2 (two) times daily before meals., Disp: 180 tablet, Rfl: 3    insulin glargine U-100, Lantus, (LANTUS SOLOSTAR U-100 INSULIN) 100 unit/mL (3 mL) InPn pen, Inject 25 Units into the skin every evening., Disp: 7.5 mL, Rfl: 11    lancets Misc, To check BG 3 times daily, to use with insurance preferred meter, Disp: 200 each, Rfl: 11    levocetirizine (XYZAL) 5 MG tablet, Take 5 mg by mouth., Disp: , Rfl:     losartan-hydrochlorothiazide 100-25 mg (HYZAAR) 100-25 mg per tablet, Take 1 tablet by mouth every morning., Disp: 90 tablet, Rfl: 3    meloxicam (MOBIC) 15 MG tablet, Take 15 mg by mouth., Disp: , Rfl:     metFORMIN (GLUCOPHAGE) 1000 MG tablet, Take 1 tablet (1,000 mg total) by  "mouth 2 (two) times daily., Disp: 180 tablet, Rfl: 3    methocarbamoL (ROBAXIN) 750 MG Tab, Take 750 mg by mouth every 8 (eight) hours., Disp: , Rfl:     metoprolol tartrate (LOPRESSOR) 50 MG tablet, Take 1 tablet (50 mg total) by mouth 2 (two) times daily., Disp: 180 tablet, Rfl: 3    montelukast (SINGULAIR) 10 mg tablet, Take 1 tablet (10 mg total) by mouth every evening., Disp: 90 tablet, Rfl: 3    pantoprazole (PROTONIX) 20 MG tablet, Take 1 tablet (20 mg total) by mouth every morning., Disp: 90 tablet, Rfl: 3    paroxetine (PAXIL) 20 MG tablet, Take 1 tablet (20 mg total) by mouth every evening., Disp: 90 tablet, Rfl: 3    pen needle, diabetic (BD GIUSEPPE 2ND GEN PEN NEEDLE) 32 gauge x 5/32" Ndle, 1 Device by Misc.(Non-Drug; Combo Route) route nightly., Disp: 100 each, Rfl: 11    pregabalin (LYRICA) 75 MG capsule, TAKE 1 CAPSULE 2 TIMES A DAY FOR NERVE PAIN, Disp: 180 capsule, Rfl: 1    simvastatin (ZOCOR) 20 MG tablet, Take 1 tablet (20 mg total) by mouth nightly., Disp: 90 tablet, Rfl: 3    traZODone (DESYREL) 50 MG tablet, Take 1 tablet (50 mg total) by mouth every evening., Disp: 90 tablet, Rfl: 3    "

## 2025-04-27 ENCOUNTER — TELEPHONE (OUTPATIENT)
Dept: URGENT CARE | Facility: CLINIC | Age: 75
End: 2025-04-27
Payer: MEDICARE

## 2025-04-27 NOTE — TELEPHONE ENCOUNTER
Pt seen 4/22 and no check out appt scheduled. Please call pt and schedule letting her know referral will not be sent to Ortho until confirmed controlled diabetes noted after labs. Thanks, Blanca

## 2025-04-29 ENCOUNTER — TELEPHONE (OUTPATIENT)
Dept: FAMILY MEDICINE | Facility: CLINIC | Age: 75
End: 2025-04-29
Payer: MEDICARE

## 2025-04-29 ENCOUNTER — LAB VISIT (OUTPATIENT)
Dept: LAB | Facility: HOSPITAL | Age: 75
End: 2025-04-29
Attending: NURSE PRACTITIONER
Payer: MEDICARE

## 2025-04-29 DIAGNOSIS — A49.9 BACTERIAL INFECTION: ICD-10-CM

## 2025-04-29 DIAGNOSIS — E11.59 HYPERTENSION ASSOCIATED WITH DIABETES: ICD-10-CM

## 2025-04-29 DIAGNOSIS — I15.2 HYPERTENSION ASSOCIATED WITH DIABETES: ICD-10-CM

## 2025-04-29 DIAGNOSIS — D50.8 IRON DEFICIENCY ANEMIA SECONDARY TO INADEQUATE DIETARY IRON INTAKE: ICD-10-CM

## 2025-04-29 LAB
ABSOLUTE EOSINOPHIL (OHS): 0.12 K/UL
ABSOLUTE MONOCYTE (OHS): 0.68 K/UL (ref 0.3–1)
ABSOLUTE NEUTROPHIL COUNT (OHS): 5.02 K/UL (ref 1.8–7.7)
ALBUMIN SERPL BCP-MCNC: 3.9 G/DL (ref 3.5–5.2)
ALP SERPL-CCNC: 87 UNIT/L (ref 40–150)
ALT SERPL W/O P-5'-P-CCNC: 11 UNIT/L (ref 10–44)
ANION GAP (OHS): 10 MMOL/L (ref 8–16)
AST SERPL-CCNC: 16 UNIT/L (ref 11–45)
BASOPHILS # BLD AUTO: 0.05 K/UL
BASOPHILS NFR BLD AUTO: 0.6 %
BILIRUB SERPL-MCNC: 0.4 MG/DL (ref 0.1–1)
BUN SERPL-MCNC: 11 MG/DL (ref 8–23)
CALCIUM SERPL-MCNC: 9.9 MG/DL (ref 8.7–10.5)
CHLORIDE SERPL-SCNC: 103 MMOL/L (ref 95–110)
CHOLEST SERPL-MCNC: 159 MG/DL (ref 120–199)
CHOLEST/HDLC SERPL: 3.3 {RATIO} (ref 2–5)
CO2 SERPL-SCNC: 25 MMOL/L (ref 23–29)
CREAT SERPL-MCNC: 0.8 MG/DL (ref 0.5–1.4)
EAG (OHS): 189 MG/DL (ref 68–131)
ERYTHROCYTE [DISTWIDTH] IN BLOOD BY AUTOMATED COUNT: 12.6 % (ref 11.5–14.5)
FERRITIN SERPL-MCNC: 114 NG/ML (ref 20–300)
GFR SERPLBLD CREATININE-BSD FMLA CKD-EPI: >60 ML/MIN/1.73/M2
GLUCOSE SERPL-MCNC: 121 MG/DL (ref 70–110)
HBA1C MFR BLD: 8.2 % (ref 4–5.6)
HCT VFR BLD AUTO: 41.8 % (ref 37–48.5)
HDLC SERPL-MCNC: 48 MG/DL (ref 40–75)
HDLC SERPL: 30.2 % (ref 20–50)
HGB BLD-MCNC: 14.1 GM/DL (ref 12–16)
IMM GRANULOCYTES # BLD AUTO: 0.03 K/UL (ref 0–0.04)
IMM GRANULOCYTES NFR BLD AUTO: 0.4 % (ref 0–0.5)
IRON SATN MFR SERPL: 11 % (ref 20–50)
IRON SERPL-MCNC: 47 UG/DL (ref 30–160)
LDLC SERPL CALC-MCNC: 90 MG/DL (ref 63–159)
LYMPHOCYTES # BLD AUTO: 2.35 K/UL (ref 1–4.8)
MCH RBC QN AUTO: 27.5 PG (ref 27–31)
MCHC RBC AUTO-ENTMCNC: 33.7 G/DL (ref 32–36)
MCV RBC AUTO: 82 FL (ref 82–98)
NONHDLC SERPL-MCNC: 111 MG/DL
NUCLEATED RBC (/100WBC) (OHS): 0 /100 WBC
PLATELET # BLD AUTO: 342 K/UL (ref 150–450)
PMV BLD AUTO: 9.2 FL (ref 9.2–12.9)
POTASSIUM SERPL-SCNC: 3.5 MMOL/L (ref 3.5–5.1)
PROT SERPL-MCNC: 8.5 GM/DL (ref 6–8.4)
RBC # BLD AUTO: 5.12 M/UL (ref 4–5.4)
RELATIVE EOSINOPHIL (OHS): 1.5 %
RELATIVE LYMPHOCYTE (OHS): 28.5 % (ref 18–48)
RELATIVE MONOCYTE (OHS): 8.2 % (ref 4–15)
RELATIVE NEUTROPHIL (OHS): 60.8 % (ref 38–73)
SODIUM SERPL-SCNC: 138 MMOL/L (ref 136–145)
TIBC SERPL-MCNC: 426 UG/DL (ref 250–450)
TRANSFERRIN SERPL-MCNC: 288 MG/DL (ref 200–375)
TRIGL SERPL-MCNC: 105 MG/DL (ref 30–150)
TSH SERPL-ACNC: 0.89 UIU/ML (ref 0.4–4)
WBC # BLD AUTO: 8.25 K/UL (ref 3.9–12.7)

## 2025-04-29 PROCEDURE — 80053 COMPREHEN METABOLIC PANEL: CPT

## 2025-04-29 PROCEDURE — 82728 ASSAY OF FERRITIN: CPT

## 2025-04-29 PROCEDURE — 82465 ASSAY BLD/SERUM CHOLESTEROL: CPT

## 2025-04-29 PROCEDURE — 85025 COMPLETE CBC W/AUTO DIFF WBC: CPT

## 2025-04-29 PROCEDURE — 83036 HEMOGLOBIN GLYCOSYLATED A1C: CPT

## 2025-04-29 PROCEDURE — 84443 ASSAY THYROID STIM HORMONE: CPT

## 2025-04-29 PROCEDURE — 83540 ASSAY OF IRON: CPT

## 2025-04-29 PROCEDURE — 36415 COLL VENOUS BLD VENIPUNCTURE: CPT

## 2025-04-29 RX ORDER — FLUTICASONE PROPIONATE 50 MCG
2 SPRAY, SUSPENSION (ML) NASAL 2 TIMES DAILY PRN
Start: 2025-04-29

## 2025-04-30 ENCOUNTER — TELEPHONE (OUTPATIENT)
Dept: FAMILY MEDICINE | Facility: CLINIC | Age: 75
End: 2025-04-30
Payer: MEDICARE

## 2025-04-30 DIAGNOSIS — I15.2 HYPERTENSION ASSOCIATED WITH DIABETES: ICD-10-CM

## 2025-04-30 DIAGNOSIS — E11.59 HYPERTENSION ASSOCIATED WITH DIABETES: ICD-10-CM

## 2025-04-30 RX ORDER — INSULIN GLARGINE 100 [IU]/ML
25 INJECTION, SOLUTION SUBCUTANEOUS 2 TIMES DAILY
Qty: 15 ML | Refills: 11 | Status: SHIPPED | OUTPATIENT
Start: 2025-04-30 | End: 2026-04-30

## 2025-05-01 NOTE — TELEPHONE ENCOUNTER
Pt receiving medications via pill pack with NP to confirm MAR w/ [facility] thus correct. Lab results discussed w/pt & granddaughter Ky via phone r/t A1c inc from 7.5 to 8.2. Pt endorses giving 25 units lantus qhs wearing Jody with frequent symptomatic 60's. Agreeable to 15 units bid. Will fax note, labs, and this note to Dr. Gale to determine if MD will pursue surgery now.

## 2025-05-04 ENCOUNTER — RESULTS FOLLOW-UP (OUTPATIENT)
Dept: FAMILY MEDICINE | Facility: CLINIC | Age: 75
End: 2025-05-04

## 2025-06-02 DIAGNOSIS — E11.69 TYPE 2 DIABETES MELLITUS WITH HYPERLIPIDEMIA: ICD-10-CM

## 2025-06-02 DIAGNOSIS — E78.5 TYPE 2 DIABETES MELLITUS WITH HYPERLIPIDEMIA: ICD-10-CM

## 2025-06-03 RX ORDER — EMPAGLIFLOZIN 25 MG/1
25 TABLET, FILM COATED ORAL EVERY MORNING
Qty: 90 TABLET | Refills: 3 | Status: SHIPPED | OUTPATIENT
Start: 2025-06-03

## 2025-06-11 ENCOUNTER — PATIENT MESSAGE (OUTPATIENT)
Dept: ADMINISTRATIVE | Facility: HOSPITAL | Age: 75
End: 2025-06-11
Payer: MEDICARE

## 2025-07-09 NOTE — TELEPHONE ENCOUNTER
Copied from CRM #[id number]. Topic: Medications - Medication Refill  >> Jul 9, 2025  8:59 AM Kelly wrote:  Type:  RX Refill Request    Who Called:  pt   Refill or New Rx:  new   RX Name and Strength:  freestyle lamar gen 3+  How is the patient currently taking it? (ex. 1XDay):  as direct   Is this a 30 day or 90 day RX:  30   Preferred Pharmacy with phone number:    [facility] Address: [address] [place] [address], [place] [address]   Phone: [phone]  Local or Mail Order:  local   Ordering Provider:  kang Diaz Call Back Number:  [phone] (home)    Additional Information: pt states she needs a new device but needs office to send an order  please advise

## 2025-07-16 RX ORDER — BLOOD-GLUCOSE SENSOR
1 EACH MISCELLANEOUS
Qty: 4 EACH | Refills: 3 | Status: SHIPPED | OUTPATIENT
Start: 2025-07-16 | End: 2026-07-16

## 2025-07-22 DIAGNOSIS — F41.9 ANXIETY AND DEPRESSION: ICD-10-CM

## 2025-07-22 DIAGNOSIS — J30.2 SEASONAL ALLERGIES: ICD-10-CM

## 2025-07-22 DIAGNOSIS — I10 ESSENTIAL HYPERTENSION: ICD-10-CM

## 2025-07-22 DIAGNOSIS — E11.69 TYPE 2 DIABETES MELLITUS WITH HYPERLIPIDEMIA: ICD-10-CM

## 2025-07-22 DIAGNOSIS — M79.2 NERVE PAIN: ICD-10-CM

## 2025-07-22 DIAGNOSIS — F51.01 PRIMARY INSOMNIA: ICD-10-CM

## 2025-07-22 DIAGNOSIS — K21.9 GASTROESOPHAGEAL REFLUX DISEASE WITHOUT ESOPHAGITIS: ICD-10-CM

## 2025-07-22 DIAGNOSIS — F32.A ANXIETY AND DEPRESSION: ICD-10-CM

## 2025-07-22 DIAGNOSIS — E78.5 TYPE 2 DIABETES MELLITUS WITH HYPERLIPIDEMIA: ICD-10-CM

## 2025-07-23 RX ORDER — PREGABALIN 75 MG/1
CAPSULE ORAL
Qty: 180 CAPSULE | Refills: 1 | Status: SHIPPED | OUTPATIENT
Start: 2025-07-23

## 2025-07-23 RX ORDER — GLIPIZIDE 10 MG/1
TABLET ORAL
Qty: 180 TABLET | Refills: 3 | Status: SHIPPED | OUTPATIENT
Start: 2025-07-23

## 2025-07-23 RX ORDER — METOPROLOL TARTRATE 50 MG/1
TABLET ORAL
Qty: 180 TABLET | Refills: 3 | Status: SHIPPED | OUTPATIENT
Start: 2025-07-23

## 2025-07-23 RX ORDER — MONTELUKAST SODIUM 10 MG/1
TABLET ORAL
Qty: 90 TABLET | Refills: 3 | Status: SHIPPED | OUTPATIENT
Start: 2025-07-23

## 2025-07-23 RX ORDER — TRAZODONE HYDROCHLORIDE 50 MG/1
50 TABLET ORAL NIGHTLY
Qty: 90 TABLET | Refills: 3 | Status: SHIPPED | OUTPATIENT
Start: 2025-07-23 | End: 2026-07-23

## 2025-07-23 RX ORDER — SIMVASTATIN 20 MG/1
TABLET, FILM COATED ORAL
Qty: 90 TABLET | Refills: 3 | Status: SHIPPED | OUTPATIENT
Start: 2025-07-23

## 2025-07-23 RX ORDER — PANTOPRAZOLE SODIUM 20 MG/1
TABLET, DELAYED RELEASE ORAL
Qty: 90 TABLET | Refills: 3 | Status: SHIPPED | OUTPATIENT
Start: 2025-07-23

## 2025-07-23 RX ORDER — PAROXETINE 20 MG/1
20 TABLET, FILM COATED ORAL NIGHTLY
Qty: 90 TABLET | Refills: 3 | Status: SHIPPED | OUTPATIENT
Start: 2025-07-23 | End: 2026-07-23

## 2025-07-23 RX ORDER — AMLODIPINE BESYLATE 10 MG/1
10 TABLET ORAL NIGHTLY
Qty: 90 TABLET | Refills: 3 | Status: SHIPPED | OUTPATIENT
Start: 2025-07-23

## 2025-07-23 RX ORDER — METFORMIN HYDROCHLORIDE 1000 MG/1
TABLET ORAL
Qty: 180 TABLET | Refills: 3 | Status: SHIPPED | OUTPATIENT
Start: 2025-07-23

## 2025-07-23 RX ORDER — ASPIRIN 81 MG/1
TABLET ORAL
Qty: 90 TABLET | Refills: 3 | Status: SHIPPED | OUTPATIENT
Start: 2025-07-23

## 2025-07-23 RX ORDER — LOSARTAN POTASSIUM AND HYDROCHLOROTHIAZIDE 25; 100 MG/1; MG/1
TABLET ORAL
Qty: 90 TABLET | Refills: 3 | Status: SHIPPED | OUTPATIENT
Start: 2025-07-23

## 2025-07-23 RX ORDER — BUSPIRONE HYDROCHLORIDE 30 MG/1
TABLET ORAL
Qty: 180 TABLET | Refills: 3 | Status: SHIPPED | OUTPATIENT
Start: 2025-07-23

## 2025-08-13 DIAGNOSIS — E11.9 TYPE 2 DIABETES MELLITUS WITHOUT COMPLICATION: ICD-10-CM

## 2025-08-17 ENCOUNTER — TELEPHONE (OUTPATIENT)
Dept: FAMILY MEDICINE | Facility: CLINIC | Age: 75
End: 2025-08-17
Payer: MEDICARE

## 2025-08-17 DIAGNOSIS — E78.5 TYPE 2 DIABETES MELLITUS WITH HYPERLIPIDEMIA: Primary | ICD-10-CM

## 2025-08-17 DIAGNOSIS — E11.69 TYPE 2 DIABETES MELLITUS WITH HYPERLIPIDEMIA: Primary | ICD-10-CM

## 2025-08-19 DIAGNOSIS — E11.69 TYPE 2 DIABETES MELLITUS WITH HYPERLIPIDEMIA: ICD-10-CM

## 2025-08-19 DIAGNOSIS — E78.5 TYPE 2 DIABETES MELLITUS WITH HYPERLIPIDEMIA: ICD-10-CM

## 2025-08-20 RX ORDER — EMPAGLIFLOZIN 25 MG/1
TABLET, FILM COATED ORAL
Qty: 30 TABLET | Refills: 11 | Status: SHIPPED | OUTPATIENT
Start: 2025-08-20

## (undated) DEVICE — PACK NASAL SINUS

## (undated) DEVICE — CATH RED RUBBER 8FR

## (undated) DEVICE — SOL 9P NACL IRR PIC IL

## (undated) DEVICE — Device

## (undated) DEVICE — CANISTER SUCTION 3000CC

## (undated) DEVICE — SLEEVE SCD EXPRESS CALF MEDIUM

## (undated) DEVICE — DEVICE INFLATION SE SINUS BLLN

## (undated) DEVICE — SUT SILK 2.0 BLK 18

## (undated) DEVICE — SYR B-D DISP CONTROL 10CC100/C

## (undated) DEVICE — NDL SPINAL 25GX3.5 SPINOCAN

## (undated) DEVICE — SPONGE PATTY SURGICAL .5X3IN

## (undated) DEVICE — SOL NACL IRR 1000ML BTL

## (undated) DEVICE — NDL SPINAL X-LONG 18GA

## (undated) DEVICE — SYR 30CC LUER LOCK

## (undated) DEVICE — ELECTRODE REM PLYHSV RETURN 9

## (undated) DEVICE — BLLN SYS SIMUPLASTY 6MM

## (undated) DEVICE — SLEEVE SCD EXPRESS CALF LARGE

## (undated) DEVICE — GLOVE PI ULTRA TOUCH G SURGEON

## (undated) DEVICE — BLADE SURG #15 CARBON STEEL

## (undated) DEVICE — GLOVE SURG ULTRA TOUCH 8